# Patient Record
Sex: FEMALE | Race: ASIAN | NOT HISPANIC OR LATINO | Employment: OTHER | URBAN - METROPOLITAN AREA
[De-identification: names, ages, dates, MRNs, and addresses within clinical notes are randomized per-mention and may not be internally consistent; named-entity substitution may affect disease eponyms.]

---

## 2019-07-10 LAB — HEP C HIM: NORMAL

## 2019-10-01 LAB
PAP-ABSTRACT: NORMAL
TSH SERPL-ACNC: 1.01 MIU/L

## 2019-10-31 ENCOUNTER — TRANSFERRED RECORDS (OUTPATIENT)
Dept: HEALTH INFORMATION MANAGEMENT | Facility: CLINIC | Age: 24
End: 2019-10-31

## 2019-11-15 ENCOUNTER — TRANSFERRED RECORDS (OUTPATIENT)
Dept: HEALTH INFORMATION MANAGEMENT | Facility: CLINIC | Age: 24
End: 2019-11-15

## 2019-12-02 ENCOUNTER — OFFICE VISIT (OUTPATIENT)
Dept: DERMATOLOGY | Facility: CLINIC | Age: 24
End: 2019-12-02
Payer: COMMERCIAL

## 2019-12-02 DIAGNOSIS — L73.9 FOLLICULITIS: Primary | ICD-10-CM

## 2019-12-02 DIAGNOSIS — D23.71 DERMATOFIBROMA OF RIGHT LOWER EXTREMITY: ICD-10-CM

## 2019-12-02 RX ORDER — DOXYCYCLINE 100 MG/1
100 CAPSULE ORAL 2 TIMES DAILY
Qty: 60 CAPSULE | Refills: 1 | Status: SHIPPED | OUTPATIENT
Start: 2019-12-02 | End: 2020-01-20

## 2019-12-02 RX ORDER — KETOCONAZOLE 20 MG/ML
SHAMPOO TOPICAL
Qty: 120 ML | Refills: 11 | Status: SHIPPED | OUTPATIENT
Start: 2019-12-02 | End: 2020-08-19

## 2019-12-02 RX ORDER — CLINDAMYCIN PHOSPHATE 11.9 MG/ML
SOLUTION TOPICAL
Qty: 60 ML | Refills: 1 | Status: SHIPPED | OUTPATIENT
Start: 2019-12-02 | End: 2020-06-24

## 2019-12-02 ASSESSMENT — PAIN SCALES - GENERAL: PAINLEVEL: NO PAIN (0)

## 2019-12-02 NOTE — LETTER
12/2/2019       RE: Pam Thompson  [address]  [place] [id number]     Dear Colleague,    Thank you for referring your patient, Pam Thompson, to the [facility] DERMATOLOGY at [facility] Please see a copy of my visit note below.    [facility] Dermatology Note      Dermatology Problem List:  1. Folliculitis, scalp  - ketoconazole 2% shampoo, use on the scalp every day to every other day  - doxycycline 100 mg PO BID for 4 weeks  - clindamycin 1% lotion, use if positive pregnancy test and discontinue doxycycline    Encounter Date: Dec 2, 2019    CC:  Chief Complaint   Patient presents with     Derm Problem     Lesions on scalp, worse in the past 6 months.         History of Present Illness:  Ms. Pam Thompson is a 23 year old female who presents in self referral as a new patient in the dermatology clinic for evaluation of lesions on her scalp.    She is with her  and two children. She reports that the lesions on her scalp have been present for two years. She has not noticed any hair loss associated with the lesions. She uses Tresume shampoo when she washes her scalp once a week but showers every day. The lesions have gotten worse in the last six months. One month ago the lesions were much more widespread on her scalp. She shaved her hair in mid October as the lesions were bothersome to her. Today is an average day in terms of the severity of the lesions. She last used shampoo today. She notes that she has a lot of flaking of the scalp. She and her  are currently trying for a child and they are not interested in medication that would interfere with that process.     She also notes a lesion of interest on her right foot. This arose in May and presented as a white dot and has since grown and become tender to the touch as it rubs against her shoes. Denies similar lesions in the past.     Denies a family hx of skin  diseases or skin cancers.    Otherwise she is feeling well, without additional skin concerns at this time.      Past Medical History:   There is no problem list on file for this patient.    No past medical history on file.  No past surgical history on file.    Social History:   Lives at home with her  and two children    Family History:  Denies a family hx of skin diseases or skin cancers.  No family history on file.    Medications:  No current outpatient medications on file.       Allergies   Allergen Reactions     Watermelon Flavor Anaphylaxis     Lip and face swelling.       Review of Systems:  -As per HPI  -Constitutional: The patient denies fatigue, fevers, chills, unintended weight loss, and night sweats.  -HEENT: Patient denies nonhealing oral sores.  -Skin: As above in HPI. No additional skin concerns.    Physical exam:  Vitals: There were no vitals taken for this visit.  GEN: This is a well developed, well-nourished female in no acute distress, in a pleasant mood.    SKIN: Focused examination of the face, scalp, and right foot was performed.    - 4 mm flesh colored papule on the right dorsal foot.  - Scattered perifollicular papules and pustules throughout the scalp, concentrated on the right temporal scalp  - No other lesions of concern on areas examined.       Impression/Plan:  1. Folliculitis, scalp     Start ketoconazole 2% shampoo, use on the scalp every day to every other day    Start doxycycline 100 mg PO BID for 4 weeks    Start clindamycin 1% Solution if patient discovers she is pregnant.     Pt will wait to start treatments until she takes a pregnancy test. She was advised topical clindamycin safe in pregnancy but would not want to use doxycycline    Recommended to consume probiotics such as yogurt while on antibiotics    Photodocumentation was obtained today    2. Dermatofibroma, right dorsal foot    Benign nature reassured, no further intervention required at this time.      Photodocumentation was obtained today    Will monitor at follow up for continued growth      Follow-up in 4-6 weeks, earlier for new or changing lesions.       Staff Involved:  Staff/Scribe    Scribe Disclosure:  I, Aric Rudolph am serving as a scribe to document services personally performed by Jodie Carlisle PA-C based on data collection and the provider's statements to me.     Provider Disclosure:   The documentation recorded by the scribe accurately reflects the services I personally performed and the decisions made by me.    All risks, benefits and alternatives were discussed with patient.  Patient is in agreement and understands the assessment and plan.  All questions were answered.  Sun Screen Education was given.   Return to Clinic in 4-6 weeks or sooner as needed.   Jodie Carlisle PA-C   [facility]                 Again, thank you for allowing me to participate in the care of your patient.      Sincerely,    Jodie Carlisle PA-C

## 2019-12-02 NOTE — PROGRESS NOTES
[facility] Dermatology Note      Dermatology Problem List:  1. Folliculitis, scalp  - ketoconazole 2% shampoo, use on the scalp every day to every other day  - doxycycline 100 mg PO BID for 4 weeks  - clindamycin 1% lotion, use if positive pregnancy test and discontinue doxycycline    Encounter Date: Dec 2, 2019    CC:  Chief Complaint   Patient presents with     Derm Problem     Lesions on scalp, worse in the past 6 months.         History of Present Illness:  Ms. Pam Thompson is a 23 year old female who presents in self referral as a new patient in the dermatology clinic for evaluation of lesions on her scalp.    She is with her  and two children. She reports that the lesions on her scalp have been present for two years. She has not noticed any hair loss associated with the lesions. She uses Tresume shampoo when she washes her scalp once a week but showers every day. The lesions have gotten worse in the last six months. One month ago the lesions were much more widespread on her scalp. She shaved her hair in mid October as the lesions were bothersome to her. Today is an average day in terms of the severity of the lesions. She last used shampoo today. She notes that she has a lot of flaking of the scalp. She and her  are currently trying for a child and they are not interested in medication that would interfere with that process.     She also notes a lesion of interest on her right foot. This arose in May and presented as a white dot and has since grown and become tender to the touch as it rubs against her shoes. Denies similar lesions in the past.     Denies a family hx of skin diseases or skin cancers.    Otherwise she is feeling well, without additional skin concerns at this time.      Past Medical History:   There is no problem list on file for this patient.    No past medical history on file.  No past surgical history on file.    Social History:   Lives at home with her   and two children    Family History:  Denies a family hx of skin diseases or skin cancers.  No family history on file.    Medications:  No current outpatient medications on file.       Allergies   Allergen Reactions     Watermelon Flavor Anaphylaxis     Lip and face swelling.       Review of Systems:  -As per HPI  -Constitutional: The patient denies fatigue, fevers, chills, unintended weight loss, and night sweats.  -HEENT: Patient denies nonhealing oral sores.  -Skin: As above in HPI. No additional skin concerns.    Physical exam:  Vitals: There were no vitals taken for this visit.  GEN: This is a well developed, well-nourished female in no acute distress, in a pleasant mood.    SKIN: Focused examination of the face, scalp, and right foot was performed.    - 4 mm flesh colored papule on the right dorsal foot.  - Scattered perifollicular papules and pustules throughout the scalp, concentrated on the right temporal scalp  - No other lesions of concern on areas examined.       Impression/Plan:  1. Folliculitis, scalp     Start ketoconazole 2% shampoo, use on the scalp every day to every other day    Start doxycycline 100 mg PO BID for 4 weeks    Start clindamycin 1% Solution if patient discovers she is pregnant.     Pt will wait to start treatments until she takes a pregnancy test. She was advised topical clindamycin safe in pregnancy but would not want to use doxycycline    Recommended to consume probiotics such as yogurt while on antibiotics    Photodocumentation was obtained today    2. Dermatofibroma, right dorsal foot    Benign nature reassured, no further intervention required at this time.     Photodocumentation was obtained today    Will monitor at follow up for continued growth      Follow-up in 4-6 weeks, earlier for new or changing lesions.       Staff Involved:  Staff/Scribe    Scribe Disclosure:  Aric BARNETT am serving as a scribe to document services personally performed by Jodie Carlisle  JOE based on data collection and the provider's statements to me.     Provider Disclosure:   The documentation recorded by the scribe accurately reflects the services I personally performed and the decisions made by me.    All risks, benefits and alternatives were discussed with patient.  Patient is in agreement and understands the assessment and plan.  All questions were answered.  Sun Screen Education was given.   Return to Clinic in 4-6 weeks or sooner as needed.   Jodie Carlisle PA-C   [facility] Dermatology [facility]

## 2019-12-02 NOTE — LETTER
Date:December 5, 2019      Patient was self referred, no letter generated. Do not send.        [facility] Information

## 2019-12-02 NOTE — NURSING NOTE
Dermatology Rooming Note    Pam Thompson's goals for this visit include:   Chief Complaint   Patient presents with     Derm Problem     Lesions on scalp, worse in the past 6 months.     Jessica Templeton LPN

## 2019-12-06 ENCOUNTER — TELEPHONE (OUTPATIENT)
Dept: DERMATOLOGY | Facility: CLINIC | Age: 24
End: 2019-12-06

## 2019-12-06 NOTE — TELEPHONE ENCOUNTER
[facility] Call Center    Phone Message    May a detailed message be left on voicemail: yes    Reason for Call: Medication Question or concern regarding medication   Prescription Clarification  Name of Medication: clindamycin (CLEOCIN T) 1 % external solution  Prescribing Provider: SRAVAN   Pharmacy: [facility] [id number] [place] [facility] [place], [place] - [address]   What on the order needs clarification?  This rx instruction only says 'to the scalp'. Pharmacy needs instructions in regards to frequency. Please call pt back once this has been resolved. They would like to get this today if possible.     Action Taken: Message routed to:  [facility] derm

## 2019-12-09 NOTE — TELEPHONE ENCOUNTER
[facility] Call [facility]    Phone Message    May a detailed message be left on voicemail: yes    Reason for Call: Medication Question or concern regarding medication   Prescription Clarification  Name of Medication: clindamycin (CLEOCIN T) 1 % external solution  Prescribing Provider: Mary Carlisle   Pharmacy: Request to have the Rx sent to the [facility] pharmacy   What on the order needs clarification? Bat calling to request that the Rx be sent to the [facility] pharmacy with the updated instructions vs trying to go through the [facility] in [place].  Please call him once the prescription has been sent so he can pick it up          Action Taken: Message routed to:  [facility]

## 2019-12-09 NOTE — TELEPHONE ENCOUNTER
I called the [facility] pharmacy and I spoke with Rekha. She will transfer the RX from [facility] over to [facility].   Mary May, CMA

## 2020-01-20 ENCOUNTER — OFFICE VISIT (OUTPATIENT)
Dept: DERMATOLOGY | Facility: CLINIC | Age: 25
End: 2020-01-20
Payer: COMMERCIAL

## 2020-01-20 DIAGNOSIS — Z87.2 HISTORY OF FOLLICULITIS: Primary | ICD-10-CM

## 2020-01-20 DIAGNOSIS — D23.71 DERMATOFIBROMA OF RIGHT LOWER EXTREMITY: ICD-10-CM

## 2020-01-20 ASSESSMENT — PAIN SCALES - GENERAL: PAINLEVEL: NO PAIN (0)

## 2020-01-20 NOTE — PROGRESS NOTES
[facility] Dermatology Note      Dermatology Problem List:  1. Folliculitis, scalp  - s/p clindamycin 1% lotion BID   - ketoconazole 2% shampoo, use on the scalp 2-3 times per week      Encounter Date: Jan 20, 2020    CC:  Chief Complaint   Patient presents with     Derm Problem     folliculitis f/u - Pam states that things have gotten much better         History of Present Illness:  Ms. Pam Thompson is a 24 year old female who presents as a follow up for scalp folliculitis. She was last seen in the dermatology clinic on 12/2/19 when she started ketoconazole 2% shampoo every day to every other day, and started doxycycline 100 mg PO BID for 4 weeks with plans to transition to clindamycin 1% solution if she became pregnant as she and her  were trying for a child at that time.     She is with her  and two children. She reports that she never started doxycycline at the last visit, but she has been washing her scalp daily, using ketoconazole 2% shampoo daily, and using clindamycin 1% solution twice a day to the scalp. She notes that her hair is regrowing nicely and her folliculitis is very significantly improved.     The lesion on her right foot has not changed since the last visit.    Otherwise she is feeling well, without additional skin concerns at this time.      Past Medical History:   There is no problem list on file for this patient.    No past medical history on file.  No past surgical history on file.    Social History:   Lives at home with her  and two children    Family History:  Denies a family hx of skin diseases or skin cancers.  No family history on file.    Medications:  Current Outpatient Medications   Medication Sig Dispense Refill     clindamycin (CLEOCIN T) 1 % external solution To the scalp 60 mL 1     ketoconazole (NIZORAL) 2 % external shampoo Use to wash scalp daily to every other day. 120 mL 11       Allergies   Allergen Reactions      Watermelon Flavor Anaphylaxis     Lip and face swelling.       Review of Systems:  -As per HPI  -Constitutional: The patient denies fatigue, fevers, chills, unintended weight loss, and night sweats.  -HEENT: Patient denies nonhealing oral sores.  -Skin: As above in HPI. No additional skin concerns.    Physical exam:  Vitals: There were no vitals taken for this visit.  GEN: This is a well developed, well-nourished female in no acute distress, in a pleasant mood.    SKIN: Focused examination of the face, scalp, and right foot was performed.    - No papules or pustules noted on the scalp, scalp is without scale  - 4 mm flesh colored papule on the right dorsal foot.  - No other lesions of concern on areas examined.       Impression/Plan:  1. History of Folliculitis, scalp     Decrease use of ketoconazole 2% shampoo to 2-3 times per week alternating with previous OTC anti-dandruff shampoo    Discontinue clindamycin 1% Solution, use prn for flaring as it arises    Photodocumentation was obtained today    2. Dermatofibroma, right dorsal foot, no changes noted from the last visit    Benign nature reassured, no further intervention required at this time.     Informational handout provided      Follow-up prn, earlier for new or changing lesions.       Staff Involved:  Staff/Scribe    Scribe Disclosure:  Aric BARNETT am serving as a scribe to document services personally performed by Jodie Carlisle PA-C based on data collection and the provider's statements to me.     Provider Disclosure:   The documentation recorded by the scribe accurately reflects the services I personally performed and the decisions made by me.    All risks, benefits and alternatives were discussed with patient.  Patient is in agreement and understands the assessment and plan.  All questions were answered.  Sun Screen Education was given.   Return to Clinic as needed.   Jodie Carlisle PA-C   [facility]

## 2020-01-20 NOTE — NURSING NOTE
Dermatology Rooming Note    Pam Thompson's goals for this visit include:   Chief Complaint   Patient presents with     Derm Problem     folliculitis f/u - Pam states that things have gotten much better     Dora Grewal, EMT

## 2020-01-20 NOTE — LETTER
Date:January 21, 2020      Patient was self referred, no letter generated. Do not send.        [facility] Health Information

## 2020-01-20 NOTE — LETTER
1/20/2020       RE: Pam Thompson  [address]  [place] [address]     Dear Colleague,    Thank you for referring your patient, Pam Thompson, to the [facility] DERMATOLOGY at [facility] Please see a copy of my visit note below.    [facility] Dermatology Note      Dermatology Problem List:  1. Folliculitis, scalp  - s/p clindamycin 1% lotion BID   - ketoconazole 2% shampoo, use on the scalp 2-3 times per week      Encounter Date: Jan 20, 2020    CC:  Chief Complaint   Patient presents with     Derm Problem     folliculitis f/u - Pam states that things have gotten much better         History of Present Illness:  Ms. Pam Thompson is a 24 year old female who presents as a follow up for scalp folliculitis. She was last seen in the dermatology clinic on 12/2/19 when she started ketoconazole 2% shampoo every day to every other day, and started doxycycline 100 mg PO BID for 4 weeks with plans to transition to clindamycin 1% solution if she became pregnant as she and her  were trying for a child at that time.     She is with her  and two children. She reports that she never started doxycycline at the last visit, but she has been washing her scalp daily, using ketoconazole 2% shampoo daily, and using clindamycin 1% solution twice a day to the scalp. She notes that her hair is regrowing nicely and her folliculitis is very significantly improved.     The lesion on her right foot has not changed since the last visit.    Otherwise she is feeling well, without additional skin concerns at this time.      Past Medical History:   There is no problem list on file for this patient.    No past medical history on file.  No past surgical history on file.    Social History:   Lives at home with her  and two children    Family History:  Denies a family hx of skin diseases or skin cancers.  No family history on  file.    Medications:  Current Outpatient Medications   Medication Sig Dispense Refill     clindamycin (CLEOCIN T) 1 % external solution To the scalp 60 mL 1     ketoconazole (NIZORAL) 2 % external shampoo Use to wash scalp daily to every other day. 120 mL 11       Allergies   Allergen Reactions     Watermelon Flavor Anaphylaxis     Lip and face swelling.       Review of Systems:  -As per HPI  -Constitutional: The patient denies fatigue, fevers, chills, unintended weight loss, and night sweats.  -HEENT: Patient denies nonhealing oral sores.  -Skin: As above in HPI. No additional skin concerns.    Physical exam:  Vitals: There were no vitals taken for this visit.  GEN: This is a well developed, well-nourished female in no acute distress, in a pleasant mood.    SKIN: Focused examination of the face, scalp, and right foot was performed.    - No papules or pustules noted on the scalp, scalp is without scale  - 4 mm flesh colored papule on the right dorsal foot.  - No other lesions of concern on areas examined.       Impression/Plan:  1. History of Folliculitis, scalp     Decrease use of ketoconazole 2% shampoo to 2-3 times per week alternating with previous OTC anti-dandruff shampoo    Discontinue clindamycin 1% Solution, use prn for flaring as it arises    Photodocumentation was obtained today    2. Dermatofibroma, right dorsal foot, no changes noted from the last visit    Benign nature reassured, no further intervention required at this time.     Informational handout provided      Follow-up prn, earlier for new or changing lesions.       Staff Involved:  Staff/Scribe    Scribe Disclosure:  Aric BARNETT am serving as a scribe to document services personally performed by Jodie Carlisle PA-C based on data collection and the provider's statements to me.     Provider Disclosure:   The documentation recorded by the scribe accurately reflects the services I personally performed and the decisions made by me.    All  risks, benefits and alternatives were discussed with patient.  Patient is in agreement and understands the assessment and plan.  All questions were answered.  Sun Screen Education was given.   Return to Clinic as needed.   Jodie Carlisle PA-C   [facility]           Again, thank you for allowing me to participate in the care of your patient.      Sincerely,    Jodie Carlisle PA-C

## 2020-02-25 ENCOUNTER — TRANSFERRED RECORDS (OUTPATIENT)
Dept: HEALTH INFORMATION MANAGEMENT | Facility: CLINIC | Age: 25
End: 2020-02-25

## 2020-03-24 DIAGNOSIS — O36.80X0 PREGNANCY WITH INCONCLUSIVE FETAL VIABILITY: Primary | ICD-10-CM

## 2020-03-24 NOTE — PROGRESS NOTES
Pt has NOB scheduled with ultrasound. Needed order to be placed. Future ultrasound order placed and linked with appt. Closing encounter.   Lorena Lester RN on 3/24/2020 at 4:30 PM

## 2020-03-26 ENCOUNTER — ANCILLARY PROCEDURE (OUTPATIENT)
Dept: ULTRASOUND IMAGING | Facility: CLINIC | Age: 25
End: 2020-03-26
Payer: COMMERCIAL

## 2020-03-26 ENCOUNTER — PRENATAL OFFICE VISIT (OUTPATIENT)
Dept: OBGYN | Facility: CLINIC | Age: 25
End: 2020-03-26
Payer: COMMERCIAL

## 2020-03-26 VITALS
HEART RATE: 72 BPM | DIASTOLIC BLOOD PRESSURE: 60 MMHG | WEIGHT: 109 LBS | SYSTOLIC BLOOD PRESSURE: 108 MMHG | BODY MASS INDEX: 21.4 KG/M2 | HEIGHT: 60 IN

## 2020-03-26 DIAGNOSIS — Z34.81 SUPERVISION OF NORMAL INTRAUTERINE PREGNANCY IN MULTIGRAVIDA IN FIRST TRIMESTER: ICD-10-CM

## 2020-03-26 DIAGNOSIS — O36.80X0 PREGNANCY WITH INCONCLUSIVE FETAL VIABILITY: ICD-10-CM

## 2020-03-26 DIAGNOSIS — Z13.79 GENETIC SCREENING: Primary | ICD-10-CM

## 2020-03-26 PROBLEM — Z34.80 SUPERVISION OF NORMAL INTRAUTERINE PREGNANCY IN MULTIGRAVIDA: Status: ACTIVE | Noted: 2020-03-26

## 2020-03-26 LAB
ABO + RH BLD: NORMAL
ABO + RH BLD: NORMAL
ALBUMIN UR-MCNC: NEGATIVE MG/DL
APPEARANCE UR: CLEAR
BILIRUB UR QL STRIP: NEGATIVE
BLD GP AB SCN SERPL QL: NORMAL
BLOOD BANK CMNT PATIENT-IMP: NORMAL
COLOR UR AUTO: YELLOW
ERYTHROCYTE [DISTWIDTH] IN BLOOD BY AUTOMATED COUNT: 12.2 % (ref 10–15)
GLUCOSE UR STRIP-MCNC: NEGATIVE MG/DL
HCT VFR BLD AUTO: 39.1 % (ref 35–47)
HGB BLD-MCNC: 13.6 G/DL (ref 11.7–15.7)
HGB UR QL STRIP: NEGATIVE
KETONES UR STRIP-MCNC: NEGATIVE MG/DL
LEUKOCYTE ESTERASE UR QL STRIP: NEGATIVE
MCH RBC QN AUTO: 32.6 PG (ref 26.5–33)
MCHC RBC AUTO-ENTMCNC: 34.8 G/DL (ref 31.5–36.5)
MCV RBC AUTO: 94 FL (ref 78–100)
NITRATE UR QL: NEGATIVE
PH UR STRIP: 7 PH (ref 5–7)
PLATELET # BLD AUTO: 313 10E9/L (ref 150–450)
RBC # BLD AUTO: 4.17 10E12/L (ref 3.8–5.2)
SOURCE: NORMAL
SP GR UR STRIP: 1.01 (ref 1–1.03)
SPECIMEN EXP DATE BLD: NORMAL
UROBILINOGEN UR STRIP-ACNC: 0.2 EU/DL (ref 0.2–1)
WBC # BLD AUTO: 11.9 10E9/L (ref 4–11)

## 2020-03-26 PROCEDURE — 36415 COLL VENOUS BLD VENIPUNCTURE: CPT | Performed by: OBSTETRICS & GYNECOLOGY

## 2020-03-26 PROCEDURE — 87389 HIV-1 AG W/HIV-1&-2 AB AG IA: CPT | Performed by: OBSTETRICS & GYNECOLOGY

## 2020-03-26 PROCEDURE — 81003 URINALYSIS AUTO W/O SCOPE: CPT | Performed by: OBSTETRICS & GYNECOLOGY

## 2020-03-26 PROCEDURE — 76801 OB US < 14 WKS SINGLE FETUS: CPT | Performed by: OBSTETRICS & GYNECOLOGY

## 2020-03-26 PROCEDURE — 87086 URINE CULTURE/COLONY COUNT: CPT | Performed by: OBSTETRICS & GYNECOLOGY

## 2020-03-26 PROCEDURE — 99207 ZZC FIRST OB VISIT: CPT | Performed by: OBSTETRICS & GYNECOLOGY

## 2020-03-26 PROCEDURE — 86762 RUBELLA ANTIBODY: CPT | Performed by: OBSTETRICS & GYNECOLOGY

## 2020-03-26 PROCEDURE — 86900 BLOOD TYPING SEROLOGIC ABO: CPT | Performed by: OBSTETRICS & GYNECOLOGY

## 2020-03-26 PROCEDURE — 87340 HEPATITIS B SURFACE AG IA: CPT | Performed by: OBSTETRICS & GYNECOLOGY

## 2020-03-26 PROCEDURE — 86901 BLOOD TYPING SEROLOGIC RH(D): CPT | Performed by: OBSTETRICS & GYNECOLOGY

## 2020-03-26 PROCEDURE — 86850 RBC ANTIBODY SCREEN: CPT | Performed by: OBSTETRICS & GYNECOLOGY

## 2020-03-26 PROCEDURE — 85027 COMPLETE CBC AUTOMATED: CPT | Performed by: OBSTETRICS & GYNECOLOGY

## 2020-03-26 PROCEDURE — 86780 TREPONEMA PALLIDUM: CPT | Performed by: OBSTETRICS & GYNECOLOGY

## 2020-03-26 ASSESSMENT — ANXIETY QUESTIONNAIRES
2. NOT BEING ABLE TO STOP OR CONTROL WORRYING: NOT AT ALL
GAD7 TOTAL SCORE: 0
5. BEING SO RESTLESS THAT IT IS HARD TO SIT STILL: NOT AT ALL
1. FEELING NERVOUS, ANXIOUS, OR ON EDGE: NOT AT ALL
7. FEELING AFRAID AS IF SOMETHING AWFUL MIGHT HAPPEN: NOT AT ALL
6. BECOMING EASILY ANNOYED OR IRRITABLE: NOT AT ALL
IF YOU CHECKED OFF ANY PROBLEMS ON THIS QUESTIONNAIRE, HOW DIFFICULT HAVE THESE PROBLEMS MADE IT FOR YOU TO DO YOUR WORK, TAKE CARE OF THINGS AT HOME, OR GET ALONG WITH OTHER PEOPLE: NOT DIFFICULT AT ALL
3. WORRYING TOO MUCH ABOUT DIFFERENT THINGS: NOT AT ALL

## 2020-03-26 ASSESSMENT — PATIENT HEALTH QUESTIONNAIRE - PHQ9
SUM OF ALL RESPONSES TO PHQ QUESTIONS 1-9: 10
5. POOR APPETITE OR OVEREATING: NOT AT ALL

## 2020-03-26 ASSESSMENT — MIFFLIN-ST. JEOR: SCORE: 1160.92

## 2020-03-26 NOTE — Clinical Note
Needs phone visit at 16 wks with mc if in, otherwise put at 15 wks  Needs ultrasound at 20 wks or 21 wks with mc  Currently 8 wks

## 2020-03-26 NOTE — PROGRESS NOTES
SUBJECTIVE:     HPI:    This is a 24 year old female patient,  who presents for her first obstetrical visit.      ISABEL: 11/3/2020, by Last Menstrual Period.  She is 8w2d weeks.  Her cycles are regular.  Her last menstrual period was normal.   Since her LMP, she has experienced  nausea and fatigue).     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      She has a history of  2 uncomplicated pregnancies, 2     Since her last LMP she denies use of alcohol, tobacco and street drugs.    Past medical, surgical, social and family history were reviewed and updated in [facility].        Current Outpatient Medications   Medication     ketoconazole (NIZORAL) 2 % external shampoo     Prenat w/o [id number]-DHA (PNV-DHA PO)     clindamycin (CLEOCIN T) 1 % external solution     No current facility-administered medications for this visit.        ROS:   12 point review of systems negative other than symptoms noted below or in the HPI.  Constitutional: Fatigue  Gastrointestinal: Nausea      OBJECTIVE:     EXAM:  /60   Pulse 72   Ht 1.524 m (5')   Wt 49.4 kg (109 lb)   LMP 2020   BMI 21.29 kg/m   Body mass index is 21.29 kg/m .    GENERAL: healthy, alert and no distress  EYES: Eyes grossly normal to inspection, PERRL and conjunctivae and sclerae normal  HENT: ear canals and TM's normal, nose and mouth without ulcers or lesions  SKIN: no suspicious lesions or rashes  NEURO: Normal  strength and tone, mentation intact and speech normal  PSYCH: mentation appears normal, affect normal/bright    ASSESSMENT/PLAN:       ICD-10-CM    1. Supervision of normal intrauterine pregnancy in multigravida in first trimester  Z34.81        24 year old , 8w2d weeks of pregnancy with ISABEL of 11/3/2020, by Last Menstrual Period    Discussed as follows: discussed covid and impact on prenatal care  Discussed rotating doctor schedules each week  Doesn't want inatal  Call if she wants zofran prescription 8mg ODT  Plan phone visit 5 wks     Counseling given:   - Follow up in 4-6 weeks for return OB visit.  - Recommended weight gain for pregnancy: 25-35 lbs.         PLAN/PATIENT INSTRUCTIONS:    Phone visit in 5 weeks with MC on  during my clinic day  Call if losing significant weight  Will use home scale  Call if she wants to try zofran if the B6 and unisom tablets don't help  Continue pnv      Janet De La Garza MD

## 2020-03-26 NOTE — PROGRESS NOTES
Please abstract the following data from this visit with this patient into the appropriate field in Epic:    Tests that can be patient reported without a hard copy:    Pap smear done on this date: 10/4/19 (approximately), by this group: [facility], results were negative.     Shara Newell RN on 3/26/2020 at 3:28 PM

## 2020-03-27 LAB
BACTERIA SPEC CULT: NO GROWTH
HBV SURFACE AG SERPL QL IA: NONREACTIVE
HIV 1+2 AB+HIV1 P24 AG SERPL QL IA: NONREACTIVE
Lab: NORMAL
RUBV IGG SERPL IA-ACNC: 32 IU/ML
SPECIMEN SOURCE: NORMAL
T PALLIDUM AB SER QL: NONREACTIVE

## 2020-03-27 ASSESSMENT — ANXIETY QUESTIONNAIRES: GAD7 TOTAL SCORE: 0

## 2020-04-24 NOTE — PROGRESS NOTES
"Pam Thompson is a 24 year old female who is being evaluated via a billable telephone visit.    Patient has some headaches  Ok for one cup coffee daily  Would like to do inatal, come this week  Plan  clinic 5/21   on phone today      The patient has been notified of following:     \"This telephone visit will be conducted via a call between you and your physician/provider. We have found that certain health care needs can be provided without the need for a physical exam.  This service lets us provide the care you need with a short phone conversation.  If a prescription is necessary we can send it directly to your pharmacy.  If lab work is needed we can place an order for that and you can then stop by our lab to have the test done at a later time.    Telephone visits are billed at different rates depending on your insurance coverage. During this emergency period, for some insurers they may be billed the same as an in-person visit.  Please reach out to your insurance provider with any questions.    If during the course of the call the physician/provider feels a telephone visit is not appropriate, you will not be charged for this service.\"    Patient has given verbal consent for Telephone visit?  Yes    How would you like to obtain your AVS? MyChart    Phone call duration: 10 minutes    Janet De La Garza MD        "

## 2020-04-27 ENCOUNTER — VIRTUAL VISIT (OUTPATIENT)
Dept: OBGYN | Facility: CLINIC | Age: 25
End: 2020-04-27
Payer: COMMERCIAL

## 2020-04-27 DIAGNOSIS — Z13.71 TESTING FOR GENETIC DISEASE CARRIER STATUS: ICD-10-CM

## 2020-04-27 DIAGNOSIS — Z34.81 SUPERVISION OF NORMAL INTRAUTERINE PREGNANCY IN MULTIGRAVIDA IN FIRST TRIMESTER: Primary | ICD-10-CM

## 2020-04-27 PROCEDURE — 99207 ZZC PRENATAL VISIT: CPT | Performed by: OBSTETRICS & GYNECOLOGY

## 2020-05-26 ENCOUNTER — PRENATAL OFFICE VISIT (OUTPATIENT)
Dept: OBGYN | Facility: CLINIC | Age: 25
End: 2020-05-26
Payer: COMMERCIAL

## 2020-05-26 VITALS — BODY MASS INDEX: 21.72 KG/M2 | DIASTOLIC BLOOD PRESSURE: 52 MMHG | SYSTOLIC BLOOD PRESSURE: 100 MMHG | WEIGHT: 111.2 LBS

## 2020-05-26 DIAGNOSIS — Z34.82 SUPERVISION OF NORMAL INTRAUTERINE PREGNANCY IN MULTIGRAVIDA IN SECOND TRIMESTER: ICD-10-CM

## 2020-05-26 DIAGNOSIS — Z36.1 NEED FOR MATERNAL SERUM ALPHA-PROTEIN (MSAFP) SCREENING: Primary | ICD-10-CM

## 2020-05-26 PROCEDURE — 99207 ZZC PRENATAL VISIT: CPT | Performed by: OBSTETRICS & GYNECOLOGY

## 2020-05-26 NOTE — PROGRESS NOTES
Plan was us for 6/24 but may have baby sitting issues so may reschedule with a partner if needed  Feels good  Gained 2 lbs  Declined inatal  Anatomy us next time  Clinic with me at 24 wks

## 2020-05-26 NOTE — Clinical Note
Needs clinic mc and anatomy  6/24   If doesn't work for her ok to schedule a later date with a partner

## 2020-06-24 ENCOUNTER — ANCILLARY PROCEDURE (OUTPATIENT)
Dept: ULTRASOUND IMAGING | Facility: CLINIC | Age: 25
End: 2020-06-24
Attending: OBSTETRICS & GYNECOLOGY
Payer: COMMERCIAL

## 2020-06-24 ENCOUNTER — PRENATAL OFFICE VISIT (OUTPATIENT)
Dept: OBGYN | Facility: CLINIC | Age: 25
End: 2020-06-24
Payer: COMMERCIAL

## 2020-06-24 ENCOUNTER — ANCILLARY PROCEDURE (OUTPATIENT)
Dept: ULTRASOUND IMAGING | Facility: CLINIC | Age: 25
End: 2020-06-24
Payer: COMMERCIAL

## 2020-06-24 VITALS — SYSTOLIC BLOOD PRESSURE: 90 MMHG | BODY MASS INDEX: 22.65 KG/M2 | DIASTOLIC BLOOD PRESSURE: 62 MMHG | WEIGHT: 116 LBS

## 2020-06-24 DIAGNOSIS — O44.03 PLACENTA PREVIA WITHOUT HEMORRHAGE IN THIRD TRIMESTER: ICD-10-CM

## 2020-06-24 DIAGNOSIS — Z34.82 SUPERVISION OF NORMAL INTRAUTERINE PREGNANCY IN MULTIGRAVIDA IN SECOND TRIMESTER: ICD-10-CM

## 2020-06-24 DIAGNOSIS — O44.03 PLACENTA PREVIA WITHOUT HEMORRHAGE IN THIRD TRIMESTER: Primary | ICD-10-CM

## 2020-06-24 PROCEDURE — 99207 ZZC PRENATAL VISIT: CPT | Performed by: OBSTETRICS & GYNECOLOGY

## 2020-06-24 PROCEDURE — 76805 OB US >/= 14 WKS SNGL FETUS: CPT | Performed by: OBSTETRICS & GYNECOLOGY

## 2020-06-24 NOTE — PROGRESS NOTES
Posterior placenta previa  Discussed no intercourse, travel or vigorous exercise   clinic 24 wks  Mary us and gluc screen 28 wks

## 2020-07-15 DIAGNOSIS — Z23 NEED FOR TDAP VACCINATION: ICD-10-CM

## 2020-07-15 DIAGNOSIS — Z34.82 SUPERVISION OF NORMAL INTRAUTERINE PREGNANCY IN MULTIGRAVIDA IN SECOND TRIMESTER: Primary | ICD-10-CM

## 2020-07-21 ENCOUNTER — ANCILLARY PROCEDURE (OUTPATIENT)
Dept: ULTRASOUND IMAGING | Facility: CLINIC | Age: 25
End: 2020-07-21
Payer: COMMERCIAL

## 2020-07-21 ENCOUNTER — PRENATAL OFFICE VISIT (OUTPATIENT)
Dept: OBGYN | Facility: CLINIC | Age: 25
End: 2020-07-21
Payer: COMMERCIAL

## 2020-07-21 VITALS — WEIGHT: 121 LBS | DIASTOLIC BLOOD PRESSURE: 60 MMHG | BODY MASS INDEX: 23.63 KG/M2 | SYSTOLIC BLOOD PRESSURE: 100 MMHG

## 2020-07-21 DIAGNOSIS — O44.02 PLACENTA PREVIA IN SECOND TRIMESTER: ICD-10-CM

## 2020-07-21 DIAGNOSIS — Z34.82 SUPERVISION OF NORMAL INTRAUTERINE PREGNANCY IN MULTIGRAVIDA IN SECOND TRIMESTER: ICD-10-CM

## 2020-07-21 LAB — GLUCOSE 1H P 50 G GLC PO SERPL-MCNC: 84 MG/DL (ref 60–129)

## 2020-07-21 PROCEDURE — 36415 COLL VENOUS BLD VENIPUNCTURE: CPT | Performed by: OBSTETRICS & GYNECOLOGY

## 2020-07-21 PROCEDURE — 85027 COMPLETE CBC AUTOMATED: CPT | Performed by: OBSTETRICS & GYNECOLOGY

## 2020-07-21 PROCEDURE — 99207 ZZC PRENATAL VISIT: CPT | Performed by: OBSTETRICS & GYNECOLOGY

## 2020-07-21 PROCEDURE — 82950 GLUCOSE TEST: CPT | Performed by: OBSTETRICS & GYNECOLOGY

## 2020-07-21 PROCEDURE — 76815 OB US LIMITED FETUS(S): CPT | Performed by: OBSTETRICS & GYNECOLOGY

## 2020-07-21 NOTE — LETTER
[facility] [place]  [address] [address]  [phone]      July 21, 2020      Pam Thompson  [address]  [place] [address]              To Whom It May Concern:    Pam Thompson is being seen in our clinic for prenatal care.  Her Estimated Date of Delivery: Nov 3, 2020.  Patient's last menstrual period was 01/28/2020..      Sincerely,    Janet De La Garza MD

## 2020-07-21 NOTE — Clinical Note
Patient is only 25 wks today  Return 4 wks, will need tdap and hgb then  Glucose screen was done today early

## 2020-07-21 NOTE — PROGRESS NOTES
tdap and hgb next time  Gluc screen today  Previa gone on ultrasound return 4 wks  Patient feels good  Good fm

## 2020-07-22 LAB
ERYTHROCYTE [DISTWIDTH] IN BLOOD BY AUTOMATED COUNT: 12.2 % (ref 10–15)
HCT VFR BLD AUTO: 35.1 % (ref 35–47)
HGB BLD-MCNC: 11.7 G/DL (ref 11.7–15.7)
MCH RBC QN AUTO: 33.6 PG (ref 26.5–33)
MCHC RBC AUTO-ENTMCNC: 33.3 G/DL (ref 31.5–36.5)
MCV RBC AUTO: 101 FL (ref 78–100)
PLATELET # BLD AUTO: 272 10E9/L (ref 150–450)
RBC # BLD AUTO: 3.48 10E12/L (ref 3.8–5.2)
WBC # BLD AUTO: 11.7 10E9/L (ref 4–11)

## 2020-08-19 ENCOUNTER — PRENATAL OFFICE VISIT (OUTPATIENT)
Dept: OBGYN | Facility: CLINIC | Age: 25
End: 2020-08-19
Payer: COMMERCIAL

## 2020-08-19 VITALS — SYSTOLIC BLOOD PRESSURE: 100 MMHG | WEIGHT: 129 LBS | DIASTOLIC BLOOD PRESSURE: 62 MMHG | BODY MASS INDEX: 25.19 KG/M2

## 2020-08-19 DIAGNOSIS — Z23 NEED FOR TDAP VACCINATION: ICD-10-CM

## 2020-08-19 DIAGNOSIS — Z34.83 SUPERVISION OF NORMAL INTRAUTERINE PREGNANCY IN MULTIGRAVIDA IN THIRD TRIMESTER: ICD-10-CM

## 2020-08-19 PROCEDURE — 90471 IMMUNIZATION ADMIN: CPT | Performed by: OBSTETRICS & GYNECOLOGY

## 2020-08-19 PROCEDURE — 99207 ZZC PRENATAL VISIT: CPT | Performed by: OBSTETRICS & GYNECOLOGY

## 2020-08-19 PROCEDURE — 90715 TDAP VACCINE 7 YRS/> IM: CPT | Performed by: OBSTETRICS & GYNECOLOGY

## 2020-08-19 NOTE — NURSING NOTE
Prior to immunization administration, verified patients identity using patient s name and date of birth. Please see Immunization Activity for additional information.     Screening Questionnaire for Adult Immunization    Are you sick today?   No   Do you have allergies to medications, food, a vaccine component or latex?   No   Have you ever had a serious reaction after receiving a vaccination?   No   Do you have a long-term health problem with heart, lung, kidney, or metabolic disease (e.g., diabetes), asthma, a blood disorder, no spleen, complement component deficiency, a cochlear implant, or a spinal fluid leak?  Are you on long-term aspirin therapy?   No   Do you have cancer, leukemia, HIV/AIDS, or any other immune system problem?   No   Do you have a parent, brother, or sister with an immune system problem?   No   In the past 3 months, have you taken medications that affect  your immune system, such as prednisone, other steroids, or anticancer drugs; drugs for the treatment of rheumatoid arthritis, Crohn s disease, or psoriasis; or have you had radiation treatments?   No   Have you had a seizure, or a brain or other nervous system problem?   No   During the past year, have you received a transfusion of blood or blood    products, or been given immune (gamma) globulin or antiviral drug?   No   For women: Are you pregnant or is there a chance you could become       pregnant during the next month?   Yes   Have you received any vaccinations in the past 4 weeks?   No     Immunization questionnaire answers were all negative.        Per orders of Dr. De La Garza, injection of Tdap given by Mirta Oliveira CMA. Patient instructed to remain in clinic for 15 minutes afterwards, and to report any adverse reaction to me immediately.       Screening performed by Mirta Oliveira CMA on 8/19/2020 at 2:23 PM.

## 2020-09-02 ENCOUNTER — PRENATAL OFFICE VISIT (OUTPATIENT)
Dept: OBGYN | Facility: CLINIC | Age: 25
End: 2020-09-02
Payer: COMMERCIAL

## 2020-09-02 VITALS — DIASTOLIC BLOOD PRESSURE: 52 MMHG | BODY MASS INDEX: 25.51 KG/M2 | WEIGHT: 130.6 LBS | SYSTOLIC BLOOD PRESSURE: 104 MMHG

## 2020-09-02 DIAGNOSIS — Z34.83 SUPERVISION OF NORMAL INTRAUTERINE PREGNANCY IN MULTIGRAVIDA IN THIRD TRIMESTER: ICD-10-CM

## 2020-09-02 DIAGNOSIS — Z23 NEED FOR PROPHYLACTIC VACCINATION AND INOCULATION AGAINST INFLUENZA: Primary | ICD-10-CM

## 2020-09-02 PROCEDURE — 90471 IMMUNIZATION ADMIN: CPT | Performed by: OBSTETRICS & GYNECOLOGY

## 2020-09-02 PROCEDURE — 99207 ZZC PRENATAL VISIT: CPT | Performed by: OBSTETRICS & GYNECOLOGY

## 2020-09-02 PROCEDURE — 90686 IIV4 VACC NO PRSV 0.5 ML IM: CPT | Performed by: OBSTETRICS & GYNECOLOGY

## 2020-09-02 NOTE — PROGRESS NOTES
Patient has had more [place]  No lof, spotting or discharge  2 prior term deliveries  Reassured patient  Good fm  Flu vaccine today  Return 2 wks

## 2020-09-16 ENCOUNTER — PRENATAL OFFICE VISIT (OUTPATIENT)
Dept: OBGYN | Facility: CLINIC | Age: 25
End: 2020-09-16
Payer: COMMERCIAL

## 2020-09-16 VITALS — BODY MASS INDEX: 25.97 KG/M2 | SYSTOLIC BLOOD PRESSURE: 102 MMHG | DIASTOLIC BLOOD PRESSURE: 58 MMHG | WEIGHT: 133 LBS

## 2020-09-16 DIAGNOSIS — Z34.83 SUPERVISION OF NORMAL INTRAUTERINE PREGNANCY IN MULTIGRAVIDA IN THIRD TRIMESTER: ICD-10-CM

## 2020-09-16 PROCEDURE — 99207 ZZC PRENATAL VISIT: CPT | Performed by: OBSTETRICS & GYNECOLOGY

## 2020-09-16 NOTE — PROGRESS NOTES
Good fm  Return 2 wks  Explained to patient we only go to [facility] for deliveries  Had flu shot

## 2020-09-30 ENCOUNTER — PRENATAL OFFICE VISIT (OUTPATIENT)
Dept: OBGYN | Facility: CLINIC | Age: 25
End: 2020-09-30
Payer: COMMERCIAL

## 2020-09-30 VITALS — WEIGHT: 137 LBS | SYSTOLIC BLOOD PRESSURE: 102 MMHG | BODY MASS INDEX: 26.76 KG/M2 | DIASTOLIC BLOOD PRESSURE: 70 MMHG

## 2020-09-30 DIAGNOSIS — Z34.83 SUPERVISION OF NORMAL INTRAUTERINE PREGNANCY IN MULTIGRAVIDA IN THIRD TRIMESTER: ICD-10-CM

## 2020-09-30 PROCEDURE — 99207 ZZC PRENATAL VISIT: CPT | Performed by: OBSTETRICS & GYNECOLOGY

## 2020-09-30 NOTE — PROGRESS NOTES
Third baby  Wants another growth us, schedule in 2 wks  Constipation- try miralax and stool softener  Return 1 wk

## 2020-10-07 ENCOUNTER — PRENATAL OFFICE VISIT (OUTPATIENT)
Dept: OBGYN | Facility: CLINIC | Age: 25
End: 2020-10-07
Payer: COMMERCIAL

## 2020-10-07 VITALS — DIASTOLIC BLOOD PRESSURE: 68 MMHG | BODY MASS INDEX: 27.34 KG/M2 | WEIGHT: 140 LBS | SYSTOLIC BLOOD PRESSURE: 108 MMHG

## 2020-10-07 DIAGNOSIS — Z34.83 SUPERVISION OF NORMAL INTRAUTERINE PREGNANCY IN MULTIGRAVIDA IN THIRD TRIMESTER: ICD-10-CM

## 2020-10-07 DIAGNOSIS — Z36.85 ANTENATAL SCREENING FOR STREPTOCOCCUS B: Primary | ICD-10-CM

## 2020-10-07 PROCEDURE — 87653 STREP B DNA AMP PROBE: CPT | Performed by: OBSTETRICS & GYNECOLOGY

## 2020-10-07 PROCEDURE — 99207 PR PRENATAL VISIT: CPT | Performed by: OBSTETRICS & GYNECOLOGY

## 2020-10-09 LAB
GP B STREP DNA SPEC QL NAA+PROBE: NEGATIVE
SPECIMEN SOURCE: NORMAL

## 2020-10-12 ENCOUNTER — PRENATAL OFFICE VISIT (OUTPATIENT)
Dept: OBGYN | Facility: CLINIC | Age: 25
End: 2020-10-12
Attending: OBSTETRICS & GYNECOLOGY
Payer: COMMERCIAL

## 2020-10-12 ENCOUNTER — ANCILLARY PROCEDURE (OUTPATIENT)
Dept: ULTRASOUND IMAGING | Facility: CLINIC | Age: 25
End: 2020-10-12
Attending: OBSTETRICS & GYNECOLOGY
Payer: COMMERCIAL

## 2020-10-12 VITALS — DIASTOLIC BLOOD PRESSURE: 52 MMHG | BODY MASS INDEX: 27.69 KG/M2 | SYSTOLIC BLOOD PRESSURE: 98 MMHG | WEIGHT: 141.8 LBS

## 2020-10-12 DIAGNOSIS — Z34.83 SUPERVISION OF NORMAL INTRAUTERINE PREGNANCY IN MULTIGRAVIDA IN THIRD TRIMESTER: Primary | ICD-10-CM

## 2020-10-12 DIAGNOSIS — Z34.83 SUPERVISION OF NORMAL INTRAUTERINE PREGNANCY IN MULTIGRAVIDA IN THIRD TRIMESTER: ICD-10-CM

## 2020-10-12 PROCEDURE — 76816 OB US FOLLOW-UP PER FETUS: CPT

## 2020-10-12 PROCEDURE — 99207 PR PRENATAL VISIT: CPT | Performed by: OBSTETRICS & GYNECOLOGY

## 2020-10-12 NOTE — PROGRESS NOTES
gbs was negative  Normal blood pressure  3/th/-3  Growth us normal with EFW 6-1 28% and normal fluid  No bleeding or lof  Return 1 wk

## 2020-10-20 ENCOUNTER — PRENATAL OFFICE VISIT (OUTPATIENT)
Dept: OBGYN | Facility: CLINIC | Age: 25
End: 2020-10-20
Payer: COMMERCIAL

## 2020-10-20 VITALS — DIASTOLIC BLOOD PRESSURE: 62 MMHG | SYSTOLIC BLOOD PRESSURE: 120 MMHG | WEIGHT: 143 LBS | BODY MASS INDEX: 27.93 KG/M2

## 2020-10-20 DIAGNOSIS — Z34.83 SUPERVISION OF NORMAL INTRAUTERINE PREGNANCY IN MULTIGRAVIDA IN THIRD TRIMESTER: ICD-10-CM

## 2020-10-20 PROCEDURE — 99207 PR PRENATAL VISIT: CPT | Performed by: OBSTETRICS & GYNECOLOGY

## 2020-10-20 NOTE — PROGRESS NOTES
Cervix no change this week  Head is still high and floating  Good fm  Normal blood pressure  Return 1 wk

## 2020-10-23 ENCOUNTER — NURSE TRIAGE (OUTPATIENT)
Dept: NURSING | Facility: CLINIC | Age: 25
End: 2020-10-23

## 2020-10-23 NOTE — TELEPHONE ENCOUNTER
"23 y/o female and  call about tightness of belly, with occasional contractions, 38w 3d. Contractions are not consistent and not timed at this point. No fever, no vaginal bleeding, no rupture of membranes. No new headache or vision changes, no new swelling or decreased fetal movement. Discussed expectations for tightness and pressure in final weeks of last trimester. Rest and Hydration per protocols.     RN advised to call back with any changes, worsening of symptoms, and questions or concerns.     Heaven Brown RN - [place] Nurse Advisor  10/23/2000  6:15AM    Additional Information    Negative: Passed out (i.e., lost consciousness, collapsed and was not responding)    Negative: Shock suspected (e.g., cold/pale/clammy skin, too weak to stand, low BP, rapid pulse)    Negative: Difficult to awaken or acting confused (e.g., disoriented, slurred speech)    Negative: [1] SEVERE abdominal pain (e.g., excruciating) AND [2] constant AND [3] present > 1 hour    Negative: Severe bleeding (e.g., continuous red blood from vagina, or large blood clots)    Negative: Umbilical cord hanging out of the vagina (shiny, white, curled appearance, \"like telephone cord\")    Negative: Uncontrollable urge to push (i.e., feels like baby is coming out now)    Negative: Can see baby    Negative: Sounds like a life-threatening emergency to the triager    Negative: [1] First baby (primipara) AND [2] contractions < 6 minutes apart  AND [3] present 2 hours    Negative: [1] History of prior delivery (multipara) AND [2] contractions < 10 minutes apart AND [3] present 1 hour    Negative: [1] History of rapid prior delivery AND [2] contractions < 10 minutes apart    Negative: [1] Leakage of fluid from vagina AND [2] green or brown in color    Negative: [1] Leakage of fluid from vagina AND [2] leakage started > 4 hours ago    Negative: Vaginal bleeding or spotting    Negative: Baby moving less today (e.g., kick count < 5 in 1 hour or < 10 in 2 " hours)    Negative: Severe headache or headache that won't go away    Negative: New blurred vision or vision changes    Negative: MODERATE-SEVERE abdominal pain    Negative: Fever > 100.4 F (38.0 C)    Negative: [1] Leakage of fluid from vagina AND [2] leakage started < 4 hours ago    Negative: Patient sounds very sick or weak to the triager    [1] History of prior delivery (multipara) AND [2] contractions > 10 minutes, or for < 1 hour    Negative: [1] First baby (primipara) AND [2] contractions > 5 minutes apart, or for < 2 hours    Protocols used: PREGNANCY - LABOR-A-

## 2020-10-26 ENCOUNTER — PRENATAL OFFICE VISIT (OUTPATIENT)
Dept: OBGYN | Facility: CLINIC | Age: 25
End: 2020-10-26
Payer: COMMERCIAL

## 2020-10-26 ENCOUNTER — TELEPHONE (OUTPATIENT)
Dept: OBGYN | Facility: CLINIC | Age: 25
End: 2020-10-26

## 2020-10-26 VITALS — BODY MASS INDEX: 28.12 KG/M2 | DIASTOLIC BLOOD PRESSURE: 64 MMHG | SYSTOLIC BLOOD PRESSURE: 120 MMHG | WEIGHT: 144 LBS

## 2020-10-26 DIAGNOSIS — Z34.83 SUPERVISION OF NORMAL INTRAUTERINE PREGNANCY IN MULTIGRAVIDA IN THIRD TRIMESTER: ICD-10-CM

## 2020-10-26 PROCEDURE — 99207 PR PRENATAL VISIT: CPT | Performed by: OBSTETRICS & GYNECOLOGY

## 2020-10-26 NOTE — PROGRESS NOTES
3/60/-4 ballotable still  No real change, feels same as 2 weeks ago  Normal bp  + fetal movement   She has been having more intermittent cramping but not regular ctx yet   here with patient today  They are considering induction for next week Mon, Tues or Wed  Last 2 babies [place]  Had amnio with induction at 38 wks for one and 40 week induction  Discussed we have to schedule a covid test for inductions so they will let me know their wishes by phone

## 2020-10-27 ENCOUNTER — ANESTHESIA EVENT (OUTPATIENT)
Dept: OBGYN | Facility: CLINIC | Age: 25
End: 2020-10-27
Payer: COMMERCIAL

## 2020-10-27 ENCOUNTER — HOSPITAL ENCOUNTER (INPATIENT)
Facility: CLINIC | Age: 25
LOS: 4 days | Discharge: HOME | End: 2020-10-31
Attending: OBSTETRICS & GYNECOLOGY | Admitting: OBSTETRICS & GYNECOLOGY
Payer: COMMERCIAL

## 2020-10-27 ENCOUNTER — ANESTHESIA (OUTPATIENT)
Dept: OBGYN | Facility: CLINIC | Age: 25
End: 2020-10-27
Payer: COMMERCIAL

## 2020-10-27 ENCOUNTER — NURSE TRIAGE (OUTPATIENT)
Dept: NURSING | Facility: CLINIC | Age: 25
End: 2020-10-27

## 2020-10-27 DIAGNOSIS — Z34.83 SUPERVISION OF NORMAL INTRAUTERINE PREGNANCY IN MULTIGRAVIDA IN THIRD TRIMESTER: Primary | ICD-10-CM

## 2020-10-27 LAB
ABO + RH BLD: NORMAL
ABO + RH BLD: NORMAL
BASOPHILS # BLD AUTO: 0 10E9/L (ref 0–0.2)
BASOPHILS NFR BLD AUTO: 0.1 %
BLD GP AB SCN SERPL QL: NORMAL
BLOOD BANK CMNT PATIENT-IMP: NORMAL
DIFFERENTIAL METHOD BLD: ABNORMAL
EOSINOPHIL # BLD AUTO: 0.1 10E9/L (ref 0–0.7)
EOSINOPHIL NFR BLD AUTO: 1 %
ERYTHROCYTE [DISTWIDTH] IN BLOOD BY AUTOMATED COUNT: 13.2 % (ref 10–15)
HCT VFR BLD AUTO: 36.9 % (ref 35–47)
HGB BLD-MCNC: 12.4 G/DL (ref 11.7–15.7)
IMM GRANULOCYTES # BLD: 0.2 10E9/L (ref 0–0.4)
IMM GRANULOCYTES NFR BLD: 1.6 %
LABORATORY COMMENT REPORT: NORMAL
LYMPHOCYTES # BLD AUTO: 2.1 10E9/L (ref 0.8–5.3)
LYMPHOCYTES NFR BLD AUTO: 22.6 %
MCH RBC QN AUTO: 33.9 PG (ref 26.5–33)
MCHC RBC AUTO-ENTMCNC: 33.6 G/DL (ref 31.5–36.5)
MCV RBC AUTO: 101 FL (ref 78–100)
MONOCYTES # BLD AUTO: 0.8 10E9/L (ref 0–1.3)
MONOCYTES NFR BLD AUTO: 8.5 %
NEUTROPHILS # BLD AUTO: 6.2 10E9/L (ref 1.6–8.3)
NEUTROPHILS NFR BLD AUTO: 66.2 %
NRBC # BLD AUTO: 0 10*3/UL
NRBC BLD AUTO-RTO: 0 /100
PLATELET # BLD AUTO: 269 10E9/L (ref 150–450)
RBC # BLD AUTO: 3.66 10E12/L (ref 3.8–5.2)
SARS-COV-2 RNA SPEC QL NAA+PROBE: NEGATIVE
SARS-COV-2 RNA SPEC QL NAA+PROBE: NORMAL
SPECIMEN EXP DATE BLD: NORMAL
SPECIMEN SOURCE: NORMAL
SPECIMEN SOURCE: NORMAL
T PALLIDUM AB SER QL: NONREACTIVE
WBC # BLD AUTO: 9.3 10E9/L (ref 4–11)

## 2020-10-27 PROCEDURE — 86901 BLOOD TYPING SEROLOGIC RH(D): CPT | Performed by: OBSTETRICS & GYNECOLOGY

## 2020-10-27 PROCEDURE — 370N000002 HC ANESTHESIA TECHNICAL FEE, EACH ADDTL 15 MIN: Performed by: OBSTETRICS & GYNECOLOGY

## 2020-10-27 PROCEDURE — 250N000011 HC RX IP 250 OP 636: Performed by: OBSTETRICS & GYNECOLOGY

## 2020-10-27 PROCEDURE — 370N000003 HC ANESTHESIA WARD SERVICE

## 2020-10-27 PROCEDURE — 250N000009 HC RX 250: Performed by: ANESTHESIOLOGY

## 2020-10-27 PROCEDURE — 250N000011 HC RX IP 250 OP 636

## 2020-10-27 PROCEDURE — 250N000011 HC RX IP 250 OP 636: Performed by: ANESTHESIOLOGY

## 2020-10-27 PROCEDURE — 250N000013 HC RX MED GY IP 250 OP 250 PS 637: Performed by: OBSTETRICS & GYNECOLOGY

## 2020-10-27 PROCEDURE — 360N000020 HC SURGERY LEVEL 3 1ST 30 MIN: Performed by: OBSTETRICS & GYNECOLOGY

## 2020-10-27 PROCEDURE — 3E0R3BZ INTRODUCTION OF ANESTHETIC AGENT INTO SPINAL CANAL, PERCUTANEOUS APPROACH: ICD-10-PCS | Performed by: ANESTHESIOLOGY

## 2020-10-27 PROCEDURE — 370N000001 HC ANESTHESIA TECHNICAL FEE, 1ST 30 MIN: Performed by: OBSTETRICS & GYNECOLOGY

## 2020-10-27 PROCEDURE — 258N000003 HC RX IP 258 OP 636: Performed by: OBSTETRICS & GYNECOLOGY

## 2020-10-27 PROCEDURE — 250N000009 HC RX 250: Performed by: NURSE ANESTHETIST, CERTIFIED REGISTERED

## 2020-10-27 PROCEDURE — 86900 BLOOD TYPING SEROLOGIC ABO: CPT | Performed by: OBSTETRICS & GYNECOLOGY

## 2020-10-27 PROCEDURE — 258N000003 HC RX IP 258 OP 636: Performed by: NURSE ANESTHETIST, CERTIFIED REGISTERED

## 2020-10-27 PROCEDURE — 761N000004 HC RECOVERY PHASE 1 LEVEL 2 EA ADDTL HR: Performed by: OBSTETRICS & GYNECOLOGY

## 2020-10-27 PROCEDURE — G0463 HOSPITAL OUTPT CLINIC VISIT: HCPCS | Mod: 25

## 2020-10-27 PROCEDURE — U0003 INFECTIOUS AGENT DETECTION BY NUCLEIC ACID (DNA OR RNA); SEVERE ACUTE RESPIRATORY SYNDROME CORONAVIRUS 2 (SARS-COV-2) (CORONAVIRUS DISEASE [COVID-19]), AMPLIFIED PROBE TECHNIQUE, MAKING USE OF HIGH THROUGHPUT TECHNOLOGIES AS DESCRIBED BY CMS-2020-01-R: HCPCS | Performed by: OBSTETRICS & GYNECOLOGY

## 2020-10-27 PROCEDURE — 258N000003 HC RX IP 258 OP 636: Performed by: ANESTHESIOLOGY

## 2020-10-27 PROCEDURE — 360N000021 HC SURGERY LEVEL 3 EA 15 ADDTL MIN: Performed by: OBSTETRICS & GYNECOLOGY

## 2020-10-27 PROCEDURE — 272N000001 HC OR GENERAL SUPPLY STERILE: Performed by: OBSTETRICS & GYNECOLOGY

## 2020-10-27 PROCEDURE — 36415 COLL VENOUS BLD VENIPUNCTURE: CPT | Performed by: OBSTETRICS & GYNECOLOGY

## 2020-10-27 PROCEDURE — 86780 TREPONEMA PALLIDUM: CPT | Performed by: OBSTETRICS & GYNECOLOGY

## 2020-10-27 PROCEDURE — 761N000003 HC RECOVERY PHASE 1 LEVEL 2 FIRST HR: Performed by: OBSTETRICS & GYNECOLOGY

## 2020-10-27 PROCEDURE — 250N000011 HC RX IP 250 OP 636: Performed by: NURSE ANESTHETIST, CERTIFIED REGISTERED

## 2020-10-27 PROCEDURE — 250N000009 HC RX 250: Performed by: OBSTETRICS & GYNECOLOGY

## 2020-10-27 PROCEDURE — 120N000012 HC R&B POSTPARTUM

## 2020-10-27 PROCEDURE — 00HU33Z INSERTION OF INFUSION DEVICE INTO SPINAL CANAL, PERCUTANEOUS APPROACH: ICD-10-PCS | Performed by: ANESTHESIOLOGY

## 2020-10-27 PROCEDURE — 85025 COMPLETE CBC W/AUTO DIFF WBC: CPT | Performed by: OBSTETRICS & GYNECOLOGY

## 2020-10-27 PROCEDURE — 86850 RBC ANTIBODY SCREEN: CPT | Performed by: OBSTETRICS & GYNECOLOGY

## 2020-10-27 PROCEDURE — 59510 CESAREAN DELIVERY: CPT | Performed by: OBSTETRICS & GYNECOLOGY

## 2020-10-27 PROCEDURE — 59025 FETAL NON-STRESS TEST: CPT

## 2020-10-27 RX ORDER — NALOXONE HYDROCHLORIDE 0.4 MG/ML
.1-.4 INJECTION, SOLUTION INTRAMUSCULAR; INTRAVENOUS; SUBCUTANEOUS
Status: DISCONTINUED | OUTPATIENT
Start: 2020-10-27 | End: 2020-10-27

## 2020-10-27 RX ORDER — LIDOCAINE 40 MG/G
CREAM TOPICAL
Status: DISCONTINUED | OUTPATIENT
Start: 2020-10-27 | End: 2020-10-31 | Stop reason: HOSPADM

## 2020-10-27 RX ORDER — CLINDAMYCIN PHOSPHATE 900 MG/50ML
900 INJECTION, SOLUTION INTRAVENOUS EVERY 8 HOURS
Status: COMPLETED | OUTPATIENT
Start: 2020-10-27 | End: 2020-10-28

## 2020-10-27 RX ORDER — OXYCODONE HYDROCHLORIDE 5 MG/1
5 TABLET ORAL
Status: DISCONTINUED | OUTPATIENT
Start: 2020-10-27 | End: 2020-10-31 | Stop reason: HOSPADM

## 2020-10-27 RX ORDER — BISACODYL 10 MG
10 SUPPOSITORY, RECTAL RECTAL DAILY PRN
Status: DISCONTINUED | OUTPATIENT
Start: 2020-10-29 | End: 2020-10-31 | Stop reason: HOSPADM

## 2020-10-27 RX ORDER — METHYLERGONOVINE MALEATE 0.2 MG/ML
200 INJECTION INTRAVENOUS
Status: DISCONTINUED | OUTPATIENT
Start: 2020-10-27 | End: 2020-10-27

## 2020-10-27 RX ORDER — KETOROLAC TROMETHAMINE 30 MG/ML
INJECTION, SOLUTION INTRAMUSCULAR; INTRAVENOUS
Status: DISCONTINUED
Start: 2020-10-27 | End: 2020-10-27 | Stop reason: HOSPADM

## 2020-10-27 RX ORDER — MODIFIED LANOLIN
OINTMENT (GRAM) TOPICAL
Status: DISCONTINUED | OUTPATIENT
Start: 2020-10-27 | End: 2020-10-31 | Stop reason: HOSPADM

## 2020-10-27 RX ORDER — SODIUM CHLORIDE, SODIUM LACTATE, POTASSIUM CHLORIDE, CALCIUM CHLORIDE 600; 310; 30; 20 MG/100ML; MG/100ML; MG/100ML; MG/100ML
INJECTION, SOLUTION INTRAVENOUS CONTINUOUS
Status: DISCONTINUED | OUTPATIENT
Start: 2020-10-27 | End: 2020-10-27

## 2020-10-27 RX ORDER — SIMETHICONE 80 MG
80 TABLET,CHEWABLE ORAL 4 TIMES DAILY PRN
Status: DISCONTINUED | OUTPATIENT
Start: 2020-10-27 | End: 2020-10-31 | Stop reason: HOSPADM

## 2020-10-27 RX ORDER — CARBOPROST TROMETHAMINE 250 UG/ML
250 INJECTION, SOLUTION INTRAMUSCULAR
Status: DISCONTINUED | OUTPATIENT
Start: 2020-10-27 | End: 2020-10-27

## 2020-10-27 RX ORDER — CITRIC ACID/SODIUM CITRATE 334-500MG
30 SOLUTION, ORAL ORAL
Status: COMPLETED | OUTPATIENT
Start: 2020-10-27 | End: 2020-10-27

## 2020-10-27 RX ORDER — LIDOCAINE 40 MG/G
CREAM TOPICAL
Status: DISCONTINUED | OUTPATIENT
Start: 2020-10-27 | End: 2020-10-27

## 2020-10-27 RX ORDER — DEXTROSE, SODIUM CHLORIDE, SODIUM LACTATE, POTASSIUM CHLORIDE, AND CALCIUM CHLORIDE 5; .6; .31; .03; .02 G/100ML; G/100ML; G/100ML; G/100ML; G/100ML
INJECTION, SOLUTION INTRAVENOUS CONTINUOUS
Status: DISCONTINUED | OUTPATIENT
Start: 2020-10-27 | End: 2020-10-31 | Stop reason: HOSPADM

## 2020-10-27 RX ORDER — ONDANSETRON 2 MG/ML
4 INJECTION INTRAMUSCULAR; INTRAVENOUS EVERY 6 HOURS PRN
Status: DISCONTINUED | OUTPATIENT
Start: 2020-10-27 | End: 2020-10-27

## 2020-10-27 RX ORDER — LIDOCAINE HYDROCHLORIDE AND EPINEPHRINE 15; 5 MG/ML; UG/ML
3 INJECTION, SOLUTION EPIDURAL
Status: COMPLETED | OUTPATIENT
Start: 2020-10-27 | End: 2020-10-27

## 2020-10-27 RX ORDER — ONDANSETRON 4 MG/1
4 TABLET, ORALLY DISINTEGRATING ORAL EVERY 6 HOURS PRN
Status: DISCONTINUED | OUTPATIENT
Start: 2020-10-27 | End: 2020-10-27

## 2020-10-27 RX ORDER — METHYLERGONOVINE MALEATE 0.2 MG/ML
200 INJECTION INTRAVENOUS
Status: DISCONTINUED | OUTPATIENT
Start: 2020-10-27 | End: 2020-10-31 | Stop reason: HOSPADM

## 2020-10-27 RX ORDER — OXYTOCIN/0.9 % SODIUM CHLORIDE 30/500 ML
PLASTIC BAG, INJECTION (ML) INTRAVENOUS CONTINUOUS PRN
Status: DISCONTINUED | OUTPATIENT
Start: 2020-10-27 | End: 2020-10-27

## 2020-10-27 RX ORDER — EPHEDRINE SULFATE 50 MG/ML
5 INJECTION, SOLUTION INTRAMUSCULAR; INTRAVENOUS; SUBCUTANEOUS
Status: DISCONTINUED | OUTPATIENT
Start: 2020-10-27 | End: 2020-10-27

## 2020-10-27 RX ORDER — ACETAMINOPHEN 325 MG/1
650 TABLET ORAL EVERY 4 HOURS PRN
Status: DISCONTINUED | OUTPATIENT
Start: 2020-10-30 | End: 2020-10-31 | Stop reason: HOSPADM

## 2020-10-27 RX ORDER — ACETAMINOPHEN 325 MG/1
650 TABLET ORAL EVERY 4 HOURS PRN
Status: DISCONTINUED | OUTPATIENT
Start: 2020-10-27 | End: 2020-10-27

## 2020-10-27 RX ORDER — GENTAMICIN SULFATE 100 MG/100ML
1.5 INJECTION, SOLUTION INTRAVENOUS
Status: COMPLETED | OUTPATIENT
Start: 2020-10-27 | End: 2020-10-27

## 2020-10-27 RX ORDER — HYDROCORTISONE 2.5 %
CREAM (GRAM) TOPICAL 3 TIMES DAILY PRN
Status: DISCONTINUED | OUTPATIENT
Start: 2020-10-27 | End: 2020-10-31 | Stop reason: HOSPADM

## 2020-10-27 RX ORDER — ACETAMINOPHEN 325 MG/1
975 TABLET ORAL EVERY 6 HOURS
Status: DISPENSED | OUTPATIENT
Start: 2020-10-27 | End: 2020-10-30

## 2020-10-27 RX ORDER — AMOXICILLIN 250 MG
1 CAPSULE ORAL 2 TIMES DAILY
Status: DISCONTINUED | OUTPATIENT
Start: 2020-10-27 | End: 2020-10-31 | Stop reason: HOSPADM

## 2020-10-27 RX ORDER — HYDROMORPHONE HYDROCHLORIDE 1 MG/ML
INJECTION, SOLUTION INTRAMUSCULAR; INTRAVENOUS; SUBCUTANEOUS
Status: COMPLETED
Start: 2020-10-27 | End: 2020-10-27

## 2020-10-27 RX ORDER — NALOXONE HYDROCHLORIDE 0.4 MG/ML
.1-.4 INJECTION, SOLUTION INTRAMUSCULAR; INTRAVENOUS; SUBCUTANEOUS
Status: DISCONTINUED | OUTPATIENT
Start: 2020-10-27 | End: 2020-10-31 | Stop reason: HOSPADM

## 2020-10-27 RX ORDER — MISOPROSTOL 200 UG/1
400 TABLET ORAL
Status: DISCONTINUED | OUTPATIENT
Start: 2020-10-27 | End: 2020-10-31 | Stop reason: HOSPADM

## 2020-10-27 RX ORDER — ROPIVACAINE HYDROCHLORIDE 2 MG/ML
10 INJECTION, SOLUTION EPIDURAL; INFILTRATION; PERINEURAL ONCE
Status: COMPLETED | OUTPATIENT
Start: 2020-10-27 | End: 2020-10-27

## 2020-10-27 RX ORDER — OXYTOCIN/0.9 % SODIUM CHLORIDE 30/500 ML
100 PLASTIC BAG, INJECTION (ML) INTRAVENOUS CONTINUOUS
Status: DISCONTINUED | OUTPATIENT
Start: 2020-10-27 | End: 2020-10-31 | Stop reason: HOSPADM

## 2020-10-27 RX ORDER — IBUPROFEN 400 MG/1
800 TABLET, FILM COATED ORAL
Status: DISCONTINUED | OUTPATIENT
Start: 2020-10-27 | End: 2020-10-27

## 2020-10-27 RX ORDER — LIDOCAINE HYDROCHLORIDE AND EPINEPHRINE BITARTRATE 20; .01 MG/ML; MG/ML
INJECTION, SOLUTION SUBCUTANEOUS PRN
Status: DISCONTINUED | OUTPATIENT
Start: 2020-10-27 | End: 2020-10-27

## 2020-10-27 RX ORDER — HYDROMORPHONE HYDROCHLORIDE 1 MG/ML
.3-.5 INJECTION, SOLUTION INTRAMUSCULAR; INTRAVENOUS; SUBCUTANEOUS EVERY 30 MIN PRN
Status: DISCONTINUED | OUTPATIENT
Start: 2020-10-27 | End: 2020-10-31 | Stop reason: HOSPADM

## 2020-10-27 RX ORDER — TRANEXAMIC ACID 10 MG/ML
1 INJECTION, SOLUTION INTRAVENOUS EVERY 30 MIN PRN
Status: DISCONTINUED | OUTPATIENT
Start: 2020-10-27 | End: 2020-10-27

## 2020-10-27 RX ORDER — CLINDAMYCIN PHOSPHATE 900 MG/50ML
900 INJECTION, SOLUTION INTRAVENOUS
Status: COMPLETED | OUTPATIENT
Start: 2020-10-27 | End: 2020-10-27

## 2020-10-27 RX ORDER — OXYTOCIN 10 [USP'U]/ML
10 INJECTION, SOLUTION INTRAMUSCULAR; INTRAVENOUS
Status: DISCONTINUED | OUTPATIENT
Start: 2020-10-27 | End: 2020-10-27

## 2020-10-27 RX ORDER — CARBOPROST TROMETHAMINE 250 UG/ML
250 INJECTION, SOLUTION INTRAMUSCULAR
Status: DISCONTINUED | OUTPATIENT
Start: 2020-10-27 | End: 2020-10-31 | Stop reason: HOSPADM

## 2020-10-27 RX ORDER — GENTAMICIN SULFATE 100 MG/100ML
1.5 INJECTION, SOLUTION INTRAVENOUS EVERY 8 HOURS
Status: COMPLETED | OUTPATIENT
Start: 2020-10-27 | End: 2020-10-28

## 2020-10-27 RX ORDER — OXYCODONE AND ACETAMINOPHEN 5; 325 MG/1; MG/1
1 TABLET ORAL
Status: DISCONTINUED | OUTPATIENT
Start: 2020-10-27 | End: 2020-10-27

## 2020-10-27 RX ORDER — TRANEXAMIC ACID 10 MG/ML
1 INJECTION, SOLUTION INTRAVENOUS EVERY 30 MIN PRN
Status: DISCONTINUED | OUTPATIENT
Start: 2020-10-27 | End: 2020-10-31 | Stop reason: HOSPADM

## 2020-10-27 RX ORDER — IBUPROFEN 400 MG/1
800 TABLET, FILM COATED ORAL EVERY 6 HOURS PRN
Status: DISCONTINUED | OUTPATIENT
Start: 2020-10-28 | End: 2020-10-31 | Stop reason: HOSPADM

## 2020-10-27 RX ORDER — ACETAMINOPHEN 325 MG/1
975 TABLET ORAL EVERY 6 HOURS
Status: DISCONTINUED | OUTPATIENT
Start: 2020-10-27 | End: 2020-10-27

## 2020-10-27 RX ORDER — OXYTOCIN/0.9 % SODIUM CHLORIDE 30/500 ML
100-340 PLASTIC BAG, INJECTION (ML) INTRAVENOUS CONTINUOUS PRN
Status: DISCONTINUED | OUTPATIENT
Start: 2020-10-27 | End: 2020-10-27

## 2020-10-27 RX ORDER — KETOROLAC TROMETHAMINE 30 MG/ML
30 INJECTION, SOLUTION INTRAMUSCULAR; INTRAVENOUS EVERY 6 HOURS
Status: ACTIVE | OUTPATIENT
Start: 2020-10-27 | End: 2020-10-28

## 2020-10-27 RX ORDER — ONDANSETRON 2 MG/ML
4 INJECTION INTRAMUSCULAR; INTRAVENOUS EVERY 6 HOURS PRN
Status: DISCONTINUED | OUTPATIENT
Start: 2020-10-27 | End: 2020-10-31 | Stop reason: HOSPADM

## 2020-10-27 RX ORDER — ONDANSETRON 2 MG/ML
INJECTION INTRAMUSCULAR; INTRAVENOUS PRN
Status: DISCONTINUED | OUTPATIENT
Start: 2020-10-27 | End: 2020-10-27

## 2020-10-27 RX ORDER — AMOXICILLIN 250 MG
2 CAPSULE ORAL 2 TIMES DAILY
Status: DISCONTINUED | OUTPATIENT
Start: 2020-10-27 | End: 2020-10-31 | Stop reason: HOSPADM

## 2020-10-27 RX ORDER — OXYTOCIN/0.9 % SODIUM CHLORIDE 30/500 ML
340 PLASTIC BAG, INJECTION (ML) INTRAVENOUS CONTINUOUS PRN
Status: DISCONTINUED | OUTPATIENT
Start: 2020-10-27 | End: 2020-10-31 | Stop reason: HOSPADM

## 2020-10-27 RX ORDER — NALBUPHINE HYDROCHLORIDE 10 MG/ML
2.5-5 INJECTION, SOLUTION INTRAMUSCULAR; INTRAVENOUS; SUBCUTANEOUS EVERY 6 HOURS PRN
Status: DISCONTINUED | OUTPATIENT
Start: 2020-10-27 | End: 2020-10-27

## 2020-10-27 RX ORDER — OXYTOCIN 10 [USP'U]/ML
10 INJECTION, SOLUTION INTRAMUSCULAR; INTRAVENOUS
Status: DISCONTINUED | OUTPATIENT
Start: 2020-10-27 | End: 2020-10-31 | Stop reason: HOSPADM

## 2020-10-27 RX ORDER — ACETAMINOPHEN 325 MG/1
975 TABLET ORAL ONCE
Status: COMPLETED | OUTPATIENT
Start: 2020-10-27 | End: 2020-10-27

## 2020-10-27 RX ORDER — FENTANYL CITRATE 50 UG/ML
50-100 INJECTION, SOLUTION INTRAMUSCULAR; INTRAVENOUS
Status: DISCONTINUED | OUTPATIENT
Start: 2020-10-27 | End: 2020-10-27

## 2020-10-27 RX ORDER — MORPHINE SULFATE 1 MG/ML
INJECTION, SOLUTION EPIDURAL; INTRATHECAL; INTRAVENOUS PRN
Status: DISCONTINUED | OUTPATIENT
Start: 2020-10-27 | End: 2020-10-27

## 2020-10-27 RX ADMIN — SODIUM CITRATE AND CITRIC ACID MONOHYDRATE 30 ML: 500; 334 SOLUTION ORAL at 08:30

## 2020-10-27 RX ADMIN — ACETAMINOPHEN 975 MG: 325 TABLET, FILM COATED ORAL at 20:26

## 2020-10-27 RX ADMIN — ONDANSETRON 4 MG: 2 INJECTION INTRAMUSCULAR; INTRAVENOUS at 09:02

## 2020-10-27 RX ADMIN — PHENYLEPHRINE HYDROCHLORIDE 100 MCG: 10 INJECTION INTRAVENOUS at 09:10

## 2020-10-27 RX ADMIN — HYDROMORPHONE HYDROCHLORIDE 0.3 MG: 1 INJECTION, SOLUTION INTRAMUSCULAR; INTRAVENOUS; SUBCUTANEOUS at 10:57

## 2020-10-27 RX ADMIN — SODIUM CHLORIDE, SODIUM LACTATE, POTASSIUM CHLORIDE, CALCIUM CHLORIDE AND DEXTROSE MONOHYDRATE: 5; 600; 310; 30; 20 INJECTION, SOLUTION INTRAVENOUS at 20:24

## 2020-10-27 RX ADMIN — PHENYLEPHRINE HYDROCHLORIDE 100 MCG: 10 INJECTION INTRAVENOUS at 09:14

## 2020-10-27 RX ADMIN — LIDOCAINE HYDROCHLORIDE AND EPINEPHRINE BITARTRATE 13 ML: 20; .01 INJECTION, SOLUTION SUBCUTANEOUS at 08:39

## 2020-10-27 RX ADMIN — PHENYLEPHRINE HYDROCHLORIDE 100 MCG: 10 INJECTION INTRAVENOUS at 09:26

## 2020-10-27 RX ADMIN — CLINDAMYCIN PHOSPHATE 900 MG: 900 INJECTION, SOLUTION INTRAVENOUS at 08:41

## 2020-10-27 RX ADMIN — PHENYLEPHRINE HYDROCHLORIDE 0.3 MCG/KG/MIN: 10 INJECTION INTRAVENOUS at 08:49

## 2020-10-27 RX ADMIN — Medication 340 ML/HR: at 08:56

## 2020-10-27 RX ADMIN — KETOROLAC TROMETHAMINE 30 MG: 30 INJECTION, SOLUTION INTRAMUSCULAR at 16:47

## 2020-10-27 RX ADMIN — ACETAMINOPHEN 975 MG: 325 TABLET, FILM COATED ORAL at 08:30

## 2020-10-27 RX ADMIN — GENTAMICIN SULFATE 100 MG: 100 INJECTION, SOLUTION INTRAVENOUS at 09:01

## 2020-10-27 RX ADMIN — SODIUM CHLORIDE, POTASSIUM CHLORIDE, SODIUM LACTATE AND CALCIUM CHLORIDE: 600; 310; 30; 20 INJECTION, SOLUTION INTRAVENOUS at 09:01

## 2020-10-27 RX ADMIN — DOCUSATE SODIUM 50 MG AND SENNOSIDES 8.6 MG 1 TABLET: 8.6; 5 TABLET, FILM COATED ORAL at 20:26

## 2020-10-27 RX ADMIN — Medication 5 MG: at 07:09

## 2020-10-27 RX ADMIN — KETOROLAC TROMETHAMINE 30 MG: 30 INJECTION, SOLUTION INTRAMUSCULAR at 10:20

## 2020-10-27 RX ADMIN — MORPHINE SULFATE 1 MG: 1 INJECTION, SOLUTION EPIDURAL; INTRATHECAL; INTRAVENOUS at 08:59

## 2020-10-27 RX ADMIN — SODIUM CHLORIDE, POTASSIUM CHLORIDE, SODIUM LACTATE AND CALCIUM CHLORIDE 1000 ML: 600; 310; 30; 20 INJECTION, SOLUTION INTRAVENOUS at 04:12

## 2020-10-27 RX ADMIN — CLINDAMYCIN PHOSPHATE 900 MG: 900 INJECTION, SOLUTION INTRAVENOUS at 16:55

## 2020-10-27 RX ADMIN — KETOROLAC TROMETHAMINE 30 MG: 30 INJECTION, SOLUTION INTRAMUSCULAR at 22:36

## 2020-10-27 RX ADMIN — LIDOCAINE HYDROCHLORIDE AND EPINEPHRINE 3 ML: 15; 5 INJECTION, SOLUTION EPIDURAL at 05:12

## 2020-10-27 RX ADMIN — SODIUM CHLORIDE, POTASSIUM CHLORIDE, SODIUM LACTATE AND CALCIUM CHLORIDE: 600; 310; 30; 20 INJECTION, SOLUTION INTRAVENOUS at 04:10

## 2020-10-27 RX ADMIN — GENTAMICIN SULFATE 100 MG: 100 INJECTION, SOLUTION INTRAVENOUS at 18:30

## 2020-10-27 RX ADMIN — Medication: at 05:12

## 2020-10-27 RX ADMIN — ROPIVACAINE HYDROCHLORIDE 10 ML: 2 INJECTION, SOLUTION EPIDURAL; INFILTRATION at 05:16

## 2020-10-27 RX ADMIN — ACETAMINOPHEN 975 MG: 325 TABLET, FILM COATED ORAL at 14:37

## 2020-10-27 RX ADMIN — Medication 100 ML/HR: at 13:10

## 2020-10-27 RX ADMIN — SODIUM CHLORIDE, POTASSIUM CHLORIDE, SODIUM LACTATE AND CALCIUM CHLORIDE 250 ML: 600; 310; 30; 20 INJECTION, SOLUTION INTRAVENOUS at 07:10

## 2020-10-27 ASSESSMENT — ACTIVITIES OF DAILY LIVING (ADL)
DIFFICULTY_EATING/SWALLOWING: NO
WALKING_OR_CLIMBING_STAIRS_DIFFICULTY: NO
TOILETING_ISSUES: NO
DRESSING/BATHING_DIFFICULTY: NO
CONCENTRATING,_REMEMBERING_OR_MAKING_DECISIONS_DIFFICULTY: NO
DOING_ERRANDS_INDEPENDENTLY_DIFFICULTY: NO
WEAR_GLASSES_OR_BLIND: NO
FALL_HISTORY_WITHIN_LAST_SIX_MONTHS: NO
FALL_HISTORY_WITHIN_LAST_SIX_MONTHS: NO
DIFFICULTY_COMMUNICATING: NO

## 2020-10-27 NOTE — ANESTHESIA PREPROCEDURE EVALUATION
Anesthesia Pre-Procedure Evaluation    Patient: Pam Thompson   MRN: [id number] : 1995          Preoperative Diagnosis: * No pre-op diagnosis entered *    Procedure(s):   SECTION    Past Medical History:   Diagnosis Date     Migraine      Past Surgical History:   Procedure Laterality Date     APPENDECTOMY         Anesthesia Evaluation     .             ROS/MED HX    ENT/Pulmonary:  - neg pulmonary ROS    (-) sleep apnea   Neurologic:     (+)migraines,     Cardiovascular:  - neg cardiovascular ROS       METS/Exercise Tolerance:     Hematologic:         Musculoskeletal:         GI/Hepatic:  - neg GI/hepatic ROS      (-) GERD   Renal/Genitourinary:  - ROS Renal section negative       Endo:  - neg endo ROS       Psychiatric:         Infectious Disease:         Malignancy:         Other:                          Physical Exam  Normal systems: dental    Airway   Mallampati: II  TM distance: >3 FB  Neck ROM: full    Dental     Cardiovascular   Rhythm and rate: regular      Pulmonary    breath sounds clear to auscultation            Lab Results   Component Value Date    WBC 9.3 10/27/2020    HGB 12.4 10/27/2020    HCT 36.9 10/27/2020     10/27/2020    TSH 1.01 10/01/2019       Preop Vitals  BP Readings from Last 3 Encounters:   10/27/20 111/64   10/26/20 120/64   10/20/20 120/62    Pulse Readings from Last 3 Encounters:   20 72      Resp Readings from Last 3 Encounters:   10/27/20 16    SpO2 Readings from Last 3 Encounters:   10/27/20 95%      Temp Readings from Last 1 Encounters:   10/27/20 37.7  C (99.9  F) (Oral)    Ht Readings from Last 1 Encounters:   20 1.524 m (5')      Wt Readings from Last 1 Encounters:   10/26/20 65.3 kg (144 lb)    Estimated body mass index is 28.12 kg/m  as calculated from the following:    Height as of 3/26/20: 1.524 m (5').    Weight as of 10/26/20: 65.3 kg (144 lb).       Anesthesia Plan      History & Physical Review  History and physical reviewed and  following examination; no interval change.    ASA Status:  2 .    NPO Status:  > 8 hours    Plan for Epidural   PONV prophylaxis:  Ondansetron (or other 5HT-3)         Postoperative Care  Postoperative pain management:  Neuraxial analgesia.      Consents  Anesthetic plan, risks, benefits and alternatives discussed with:  Patient..                 Butch Chowdhury MD

## 2020-10-27 NOTE — PLAN OF CARE
Patient, , and  admitted to room 413 from L&D. Report received from Fina KUNZ RN and  ID bands verified. Fundal assessment and vital signs WNL. Call light within reach, belongings brought with and remained with patient. Infant safety and security discussed with patient and . Encouraged to call RN with needs, will continue to monitor.

## 2020-10-27 NOTE — PLAN OF CARE
DHIRAJ called to pt bedside in recovery room to assess EKG strip. Pt had brief, sudden feeling of heart fluttering in chest. EKG abnormality noted. DHIRAJ assessed strip and discussed with pt. Possible few beat run of Vfib noted. Pt stated she has had this feeling approx once per month while at home as well. No further episodes noted while in recovery. DHIRAJ stated pt is ok to transfer to [facility] without continuing monitoring during post op stay. Cont to monitor and assess.

## 2020-10-27 NOTE — ANESTHESIA PREPROCEDURE EVALUATION
Anesthesia Pre-Procedure Evaluation    Patient: Pam Thompson   MRN: [id number] : 1995          Preoperative Diagnosis: * No surgery found *        Past Medical History:   Diagnosis Date     Migraine      Past Surgical History:   Procedure Laterality Date     APPENDECTOMY                          Lab Results   Component Value Date    WBC 9.3 10/27/2020    HGB 12.4 10/27/2020    HCT 36.9 10/27/2020     10/27/2020    TSH 1.01 10/01/2019       Preop Vitals  BP Readings from Last 3 Encounters:   10/27/20 127/68   10/26/20 120/64   10/20/20 120/62    Pulse Readings from Last 3 Encounters:   20 72      Resp Readings from Last 3 Encounters:   No data found for Resp    SpO2 Readings from Last 3 Encounters:   No data found for SpO2      Temp Readings from Last 1 Encounters:   10/27/20 37.3  C (99.1  F) (Temporal)    Ht Readings from Last 1 Encounters:   20 1.524 m (5')      Wt Readings from Last 1 Encounters:   10/26/20 65.3 kg (144 lb)    Estimated body mass index is 28.12 kg/m  as calculated from the following:    Height as of 3/26/20: 1.524 m (5').    Weight as of 10/26/20: 65.3 kg (144 lb).       Anesthesia Plan      History & Physical Review      ASA Status:  2 .        Plan for Epidural            Postoperative Care      Consents                 YUE DUARTE MD

## 2020-10-27 NOTE — ANESTHESIA PROCEDURE NOTES
Procedure note : epidural catheter      Staff -   Anesthesiologist:  Joey Vidales MD  Performed By: anesthesiologist  Pre-Procedure  Performed by Joey Vidales MD  Location: OB      Pre-Anesthestic Checklist: patient identified, IV checked, risks and benefits discussed, informed consent, monitors and equipment checked, pre-op evaluation and at physician/surgeon's request    Timeout  Correct Patient: Yes   Correct Procedure: Yes   Correct Site: Yes   Correct Laterality: N/A   Correct Position: Yes   Site Marked: N/A   .   Procedure Documentation    .    Procedure: epidural catheter, .   Patient Position:sitting Insertion Site:L4-5  (midline approach) Injection technique: LORT saline   Local skin infiltrated with mL of 1% lidocaine.  SEGUN at 4 cm    Patient Prep/Sterile Barriers; mask, sterile gloves, povidone-iodine 7.5% surgical scrub, patient draped.  .  Needle: Touhy needle   Needle Gauge: 17.    Needle Length (Inches) 3.5   # of attempts: 1 and # of redirects:  .    . .  Catheter threaded easily  .  9 cm at skin.   .    Assessment/Narrative  Paresthesias: No.  .  .  Aspiration negative for heme or CSF  . Test dose of 3 mL lidocaine 1.5% w/ 1:200,000 epinephrine at. Test dose negative for signs of intravascular, subdural or intrathecal injection. Comments:  No complications.  Catheter secured with sterile dressing and tape.  Questions answered.

## 2020-10-27 NOTE — LACTATION NOTE
Initial visit. Father states infant had a good feeding at 1100 but has been sleepy since.  LC assisted and demonstrated proper positioning of infant, maternal hand placement, using breast feeding support pillows, and how to help infant achieve a deep latch with feedings.  Reviewed importance of getting a deep latch with feedings versus a shallow latch.  LC assisted mother to get infant latched onto right breast in the football position.  Infant attempts to latch on multiple times.  Able to suckle for a few short sucking bursts but tires easily.  Infant also spitty and started to choke.  LC demonstrated how to reposition infant, pat on her back, and use bulb syringe to help get excess fluid out of infants mouth.  Parents state understanding.  LC encouraged lots of skin to skin.  Breastfeeding general information reviewed. Breastfeeding section in admission booklet shown and reviewed to Mother and Father.  Shown feeding log and educated on use.  Encouraged rooming in, skin to skin, feeding on demand 8-12x/day or sooner if baby cues.  No further questions at this time. Will follow as needed.   Anayeli Lawrence RN, IBCLC

## 2020-10-27 NOTE — ANESTHESIA CARE TRANSFER NOTE
Patient: Pam Thompson    Procedure(s):   SECTION    Diagnosis: * No pre-op diagnosis entered *  Diagnosis Additional Information: No value filed.    Anesthesia Type:   Epidural     Note:  Airway :Room Air  Patient transferred to:Labor and Delivery  Comments: Transferred to OB PACU recovery, spontaneous respirations on room air with oxygen saturations maintained greater than 92%. SpO2, NiBP, and EKG monitors and alarms on and functioning, report on patient's clinical status given to OB recovery RN, RN questions answered, patient in hospital bed with siderails up, Ilana Hugger warmer connected to patient gown Oxytocin 30 units in 500mL infusion connected to IV infusion pump in recovery bay and programmed to 100 mL/hr at handoff of care.      Handoff Report: Identifed the Patient, Identified the Reponsible Provider, Reviewed the pertinent medical history, Discussed the surgical course, Reviewed Intra-OP anesthesia mangement and issues during anesthesia, Set expectations for post-procedure period and Allowed opportunity for questions and acknowledgement of understanding      Vitals: (Last set prior to Anesthesia Care Transfer)    CRNA VITALS  10/27/2020 0905 - 10/27/2020 0945      10/27/2020             Pulse:  99    SpO2:  97 %    Resp Rate (set):  10                Electronically Signed By: ROMY Villa CRNA  2020  9:39 AM

## 2020-10-27 NOTE — PLAN OF CARE
Pt admitted in labor to room 231. Monitors placed and assessment obtained. POC discussed with pt and . Oriented to room, call light and department. Questions encouraged and answered. RN will work on getting an epidural.

## 2020-10-27 NOTE — LETTER
[facility]  [address]  [place] [address]  [phone]      2020      Re: Pam Thompson  [address]   [place] [address]              To Whom It May Concern:    Pam Thompson is being seen in our clinic for prenatal care.    She delivered 1027/20 by c section.  Her  Dr Killian Schaefer will need time off from work to care for his wife during her surgery recovery and also to care for their  baby.      Sincerely,  Janet De La Garza MD

## 2020-10-27 NOTE — PROVIDER NOTIFICATION
10/27/20 0718   Provider Notification   Provider Name/Title Dr De La Garza   Method of Notification Phone   Request Evaluate in Person   Notification Reason Fetal Baseline Change   Asked Dr De La Garza if she was following pt (Dr Lucas in code section). Dr De La Garza assuming cares. Notified MD that fetal baseline tachycardic. Pt given a bolus, ephedrine and repositioned. No new orders at this time, MD just arrived to hospital and will come evaluate.

## 2020-10-27 NOTE — H&P
No significant change in general health status based on examination of the patient, review of Nursing Admission Database and prenatal record.    EFW: 6lb 8oz     Third baby   arom clear   6cm/80%/-1  Fetal tachycardia present and variability is present   Maternal temp is slightly elevated  Mom received ephedrine so her pulse 107  39 wks   A+  GBS negative  Uncomplicated pregnancy  Hep B negative  Allergies to cephalosporins- itching

## 2020-10-27 NOTE — TELEPHONE ENCOUNTER
ISABEL 11/3/20  39w0d  3rd baby  -unsure of length of last labor  Planning to deliver at [facility]   Dr. De La Garza    Contractions for 1 hour  Every 5-7 min   Lasting 1-2 minutes    No vaginal bleeding   No abdominal pain  No fever  No leakage of fluid    Triaged to a disposition of Go to L&D now. Patient and  are agreement They will be there in 30 min. Notified L&D.     COVID 19 Nurse Triage Plan/Patient Instructions    Please be aware that novel coronavirus (COVID-19) may be circulating in the community. If you develop symptoms such as fever, cough, or SOB or if you have concerns about the presence of another infection including coronavirus (COVID-19), please contact your health care provider or visit [email or web address].     Disposition/Instructions    ED Visit recommended. Follow protocol based instructions.     Bring Your Own Device:  Please also bring your smart device(s) (smart phones, tablets, laptops) and their charging cables for your personal use and to communicate with your care team during your visit.    Thank you for taking steps to prevent the spread of this virus.  o Limit your contact with others.  o Wear a simple mask to cover your cough.  o Wash your hands well and often.    Resources    [facility]: About COVID-19: [email or web address]    CDC: What to Do If You're Sick: www.cdc.gov/coronavirus/2019-ncov/about/steps-when-sick.html    CDC: Ending Home Isolation: www.cdc.gov/coronavirus/2019-ncov/hcp/disposition-in-home-patients.html     CDC: Caring for Someone: www.cdc.gov/coronavirus/2019-ncov/if-you-are-sick/care-for-someone.html     [facility]: Interim Guidance for Hospital Discharge to Home: www.health.[facility].[place].us/diseases/coronavirus/hcp/hospdischarge.pdf    [facility] clinical trials (COVID-19 research studies): [email or web address]-trials     Below are the COVID-19 hotlines at the [place] Department of Health ([facility]). Interpreters are available.   o For  "health questions: Call 789-131-9253 or 1-213.128.3361 (7 a.m. to 7 p.m.)  o For questions about schools and childcare: Call 992-194-2101 or 1-241.925.3363 (7 a.m. to 7 p.m.)     Reason for Disposition    [1] History of prior delivery (multipara) AND [2] contractions < 10 minutes apart AND [3] present 1 hour    Additional Information    Negative: Passed out (i.e., lost consciousness, collapsed and was not responding)    Negative: Shock suspected (e.g., cold/pale/clammy skin, too weak to stand, low BP, rapid pulse)    Negative: Difficult to awaken or acting confused (e.g., disoriented, slurred speech)    Negative: [1] SEVERE abdominal pain (e.g., excruciating) AND [2] constant AND [3] present > 1 hour    Negative: Severe bleeding (e.g., continuous red blood from vagina, or large blood clots)    Negative: Umbilical cord hanging out of the vagina (shiny, white, curled appearance, \"like telephone cord\")    Negative: Uncontrollable urge to push (i.e., feels like baby is coming out now)    Negative: Can see baby    Negative: Sounds like a life-threatening emergency to the triager    Negative: Pregnant < 37 weeks (i.e., )    Negative: [1] Uncertain delivery date AND [2] possibly pregnant < 37 weeks (i.e., )    Negative: [1] First baby (primipara) AND [2] contractions < 6 minutes apart  AND [3] present 2 hours    Protocols used: PREGNANCY - LABOR-A-    Heaven Gomez RN on 10/27/2020 at 2:34 AM    "

## 2020-10-27 NOTE — PLAN OF CARE
Data: Patient presented to [facility] at 0319.   Reason for maternal/fetal assessment per patient is Rule Out Labor  .  Patient is a . Prenatal record reviewed.   Gestational Age 39w0d. VSS. Fetal movement present. Patient denies backache, vaginal discharge, pelvic pressure, UTI symptoms, GI problems, bloody show, vaginal bleeding, edema, headache, visual disturbances, epigastric or URQ pain, abdominal pain, rupture of membranes. Support person Bat present.  Action: Verbal consent for EFM. Triage assessment completed. EFM applied. Uterine assessment complete. Fetal assessment: Presumed adequate fetal oxygenation documented (see flow record).   Response: Dr. Lucas informed of status. Plan per provider is admit. Patient verbalized agreement with plan. Patient transferred to room 231 via wheelchair, oriented to room and call light. Report given to DENNY Eldridge RN.

## 2020-10-27 NOTE — PROGRESS NOTES
Fetal tachycardia has gotten worse   190s to 200  Cervix still unchaged  No bleeding seen  Proceed with csection for fetal intolerance since remote from delivery  Risks and benefits discussed  Informed consent signed

## 2020-10-27 NOTE — ANESTHESIA POSTPROCEDURE EVALUATION
Patient: Pam Thompson    Procedure(s):   SECTION    Diagnosis:* No pre-op diagnosis entered *  Diagnosis Additional Information: No value filed.    Anesthesia Type:  Epidural    Note:  Anesthesia Post Evaluation    Patient location during evaluation: Bedside  Patient participation: Able to participate in evaluation but full recovery from regional anesthesia has not yet ocurrred but is anticipated to occur within 48 hours  Level of consciousness: awake and alert  Pain management: adequate  Airway patency: patent  Cardiovascular status: acceptable  Respiratory status: acceptable  Hydration status: acceptable  PONV: none             Last vitals:  Vitals:    10/27/20 1159 10/27/20 1230 10/27/20 1330   BP: 111/66 110/60 97/51   Pulse: 77 77 81   Resp: 14 15 15   Temp: 36.9  C (98.5  F) 37  C (98.6  F) 36.8  C (98.3  F)   SpO2: 98% 97% 98%         Electronically Signed By: Butch Chowdhury MD  2020  2:02 PM

## 2020-10-27 NOTE — PLAN OF CARE
Assumed care of patient at 0720. FHTs tachy, 175 at this time. Moderate variability. Dr De La Garza at bedside to assess. SVE 6/80/-1 AROM at 0730, moderate amount clear fluid noted. Pt repositioned onto right side, peanut ball for positioning. Maternal temp 99.9 oral. Cont to monitor and assess.

## 2020-10-27 NOTE — OP NOTE
Procedure Date: 10/27/2020      PREOPERATIVE DIAGNOSIS:  Fetal intolerance of labor at 39 weeks' gestation.      PROCEDURE:  Primary low transverse  section.      POSTOPERATIVE DIAGNOSIS:  Fetal intolerance of labor at 39 weeks' gestation.      SURGEON:  Janet De La Garza MD      ANESTHESIA:  Epidural.      FINDINGS:  Female infant, baby weighed 7 pounds 11 ounces.  The head was in asynclitic presentation.  Amniotic fluid was clear.      ESTIMATED BLOOD LOSS:  716 mL by quantitative blood loss.      TECHNIQUE:  The patient was taken to the operating room and placed in supine position.  Anesthesia was readministered through her epidural.  The baby had been having persistent fetal tachycardia and was running in the 190s-200s.  The mom had not spiked a formal fever, but had a temperature around 99.  Once the patient's anesthetic was adequate and we tested, the patient had been prepped and draped and had a Palacio in place.  A timeout procedure was carried out.        We made a Pfannenstiel incision.  We entered through the subcutaneous tissue.  We made a lateral incision in the fascia, the rectus muscles were divided in the midline.  Peritoneum was incised vertically.  We took down the bladder flap with sharp dissection.  A low cervical transverse incision was made in the uterus.  The baby was delivered easily through the incision.  We did delayed cord clamping for 1 minute.  Cord gases were sent.  The baby was passed for evaluation and the placenta was delivered.  The uterus was exteriorized.  The fallopian tubes and ovaries looked entirely normal.        Uterine incision was closed with 0 Monocryl in a running locking fashion, imbricating layer was closed with 0 Monocryl in a running fashion.  We had good hemostasis.  The uterus was returned to the abdominal cavity.  We irrigated and removed clots and debris from the abdominal cavity, and the peritoneum was closed with 3-0 Vicryl in a running fashion.  The fascia  was closed with 0 Vicryl in a running fashion and the skin was closed with 4-0 Vicryl in subcuticular fashion.  Steri-Strips and dressings were applied and the patient went to the recovery room in stable condition.         OLIVAI ADAMSON MD             D: 10/27/2020   T: 10/27/2020   MT: PAOLA      Name:     PARKER GREENWOOD   MRN:      [id number]        Account:        [id number]   :      1995           Procedure Date: 10/27/2020      Document: [id number]

## 2020-10-27 NOTE — PLAN OF CARE
Data: Pam Thompson transferred to room 413 via wheelchair at 1140. Baby transferred via parent's arms.  Action: Receiving unit notified of transfer: Yes. Patient and family notified of room change. Report given to DONNA Julio at 1140. Belongings sent to receiving unit. Accompanied by Registered Nurse. Oriented patient to surroundings. Call light within reach. ID bands double-checked with receiving RN.  Response: Patient tolerated transfer and is stable.

## 2020-10-28 LAB — HGB BLD-MCNC: 10.6 G/DL (ref 11.7–15.7)

## 2020-10-28 PROCEDURE — 120N000012 HC R&B POSTPARTUM

## 2020-10-28 PROCEDURE — 85018 HEMOGLOBIN: CPT | Performed by: OBSTETRICS & GYNECOLOGY

## 2020-10-28 PROCEDURE — 250N000011 HC RX IP 250 OP 636: Performed by: OBSTETRICS & GYNECOLOGY

## 2020-10-28 PROCEDURE — 250N000009 HC RX 250: Performed by: OBSTETRICS & GYNECOLOGY

## 2020-10-28 PROCEDURE — 250N000013 HC RX MED GY IP 250 OP 250 PS 637: Performed by: OBSTETRICS & GYNECOLOGY

## 2020-10-28 PROCEDURE — 36415 COLL VENOUS BLD VENIPUNCTURE: CPT | Performed by: OBSTETRICS & GYNECOLOGY

## 2020-10-28 RX ADMIN — CLINDAMYCIN PHOSPHATE 900 MG: 900 INJECTION, SOLUTION INTRAVENOUS at 00:27

## 2020-10-28 RX ADMIN — GENTAMICIN SULFATE 100 MG: 100 INJECTION, SOLUTION INTRAVENOUS at 01:57

## 2020-10-28 RX ADMIN — ACETAMINOPHEN 975 MG: 325 TABLET, FILM COATED ORAL at 08:11

## 2020-10-28 RX ADMIN — IBUPROFEN 800 MG: 400 TABLET ORAL at 12:17

## 2020-10-28 RX ADMIN — DOCUSATE SODIUM 50 MG AND SENNOSIDES 8.6 MG 2 TABLET: 8.6; 5 TABLET, FILM COATED ORAL at 08:11

## 2020-10-28 RX ADMIN — OXYCODONE HYDROCHLORIDE 5 MG: 5 TABLET ORAL at 21:54

## 2020-10-28 RX ADMIN — OXYCODONE HYDROCHLORIDE 5 MG: 5 TABLET ORAL at 12:17

## 2020-10-28 RX ADMIN — OXYCODONE HYDROCHLORIDE 5 MG: 5 TABLET ORAL at 08:11

## 2020-10-28 RX ADMIN — DOCUSATE SODIUM 50 MG AND SENNOSIDES 8.6 MG 2 TABLET: 8.6; 5 TABLET, FILM COATED ORAL at 20:39

## 2020-10-28 RX ADMIN — IBUPROFEN 800 MG: 400 TABLET ORAL at 18:38

## 2020-10-28 RX ADMIN — OXYCODONE HYDROCHLORIDE 5 MG: 5 TABLET ORAL at 18:38

## 2020-10-28 RX ADMIN — ACETAMINOPHEN 975 MG: 325 TABLET, FILM COATED ORAL at 02:00

## 2020-10-28 RX ADMIN — ACETAMINOPHEN 975 MG: 325 TABLET, FILM COATED ORAL at 20:38

## 2020-10-28 RX ADMIN — OXYCODONE HYDROCHLORIDE 5 MG: 5 TABLET ORAL at 04:36

## 2020-10-28 NOTE — PLAN OF CARE
Vital signs stable, assessment WNL. Dressing clean, dry, and intact. Pain controlled with scheduled Toradol and Tylenol; states satisfaction with pain management. Stood at bedside with some lightheadedness. Palacio draining adequate amounts of clear, yellow urine. Working on breastfeeding, mostly sleepy attempts. Encouraged to call RN with needs. Will continue to monitor.

## 2020-10-28 NOTE — PLAN OF CARE
Fundus firm and bleeding wnl.  VSS-BP's soft & normal per patient.  Catheter in place and draining adequately with large volumes- will pull this AM.  Received anitbiotics overnight and IV SL.  Lungs clear.  Passing flatus and tolerating regular diet.  Denies pain and taking scheduled tylenol & toradol.  Ambulated to bathroom with 1 assist for kat care.  Started pumping overnight due to infant being sleepy- not getting anything at this time.  Encouraged to call with questions or concerns.  Will continue to monitor.

## 2020-10-28 NOTE — PROGRESS NOTES
S: Pt doing well. Infant is being . Pain is well controlled.    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

## 2020-10-28 NOTE — ANESTHESIA POSTPROCEDURE EVALUATION
Patient: Pam Thompson    * No procedures listed *    Diagnosis:* No pre-op diagnosis entered *  Diagnosis Additional Information: No value filed.    Anesthesia Type:  No value filed.    Note:  Anesthesia Post Evaluation    Patient location during evaluation: Bedside and Floor  Patient participation: Able to fully participate in evaluation  Level of consciousness: awake  Pain management: adequate  Airway patency: patent  Cardiovascular status: acceptable  Respiratory status: acceptable  Hydration status: acceptable  PONV: none     Anesthetic complications: None          Last vitals:  Vitals:    10/28/20 0652 10/28/20 0848 10/28/20 1838   BP:  101/58 110/63   Pulse: 82 82 87   Resp: 16 16 16   Temp:  36.8  C (98.3  F) 36.9  C (98.5  F)   SpO2: 96%           Electronically Signed By: Christiano Cruz MD  October 28, 2020  6:43 PM

## 2020-10-29 LAB
BASOPHILS # BLD AUTO: 0 10E9/L (ref 0–0.2)
BASOPHILS NFR BLD AUTO: 0.1 %
DIFFERENTIAL METHOD BLD: ABNORMAL
EOSINOPHIL # BLD AUTO: 0.2 10E9/L (ref 0–0.7)
EOSINOPHIL NFR BLD AUTO: 1.5 %
ERYTHROCYTE [DISTWIDTH] IN BLOOD BY AUTOMATED COUNT: 13.5 % (ref 10–15)
HCT VFR BLD AUTO: 31.7 % (ref 35–47)
HGB BLD-MCNC: 10.8 G/DL (ref 11.7–15.7)
IMM GRANULOCYTES # BLD: 0.1 10E9/L (ref 0–0.4)
IMM GRANULOCYTES NFR BLD: 0.8 %
LYMPHOCYTES # BLD AUTO: 2 10E9/L (ref 0.8–5.3)
LYMPHOCYTES NFR BLD AUTO: 19.3 %
MCH RBC QN AUTO: 34.6 PG (ref 26.5–33)
MCHC RBC AUTO-ENTMCNC: 34.1 G/DL (ref 31.5–36.5)
MCV RBC AUTO: 102 FL (ref 78–100)
MONOCYTES # BLD AUTO: 0.8 10E9/L (ref 0–1.3)
MONOCYTES NFR BLD AUTO: 7.8 %
NEUTROPHILS # BLD AUTO: 7.3 10E9/L (ref 1.6–8.3)
NEUTROPHILS NFR BLD AUTO: 70.5 %
NRBC # BLD AUTO: 0 10*3/UL
NRBC BLD AUTO-RTO: 0 /100
PLATELET # BLD AUTO: 234 10E9/L (ref 150–450)
RBC # BLD AUTO: 3.12 10E12/L (ref 3.8–5.2)
WBC # BLD AUTO: 10.4 10E9/L (ref 4–11)

## 2020-10-29 PROCEDURE — 120N000012 HC R&B POSTPARTUM

## 2020-10-29 PROCEDURE — 36415 COLL VENOUS BLD VENIPUNCTURE: CPT | Performed by: OBSTETRICS & GYNECOLOGY

## 2020-10-29 PROCEDURE — 250N000013 HC RX MED GY IP 250 OP 250 PS 637: Performed by: OBSTETRICS & GYNECOLOGY

## 2020-10-29 PROCEDURE — 85025 COMPLETE CBC W/AUTO DIFF WBC: CPT | Performed by: OBSTETRICS & GYNECOLOGY

## 2020-10-29 RX ADMIN — IBUPROFEN 800 MG: 400 TABLET ORAL at 08:03

## 2020-10-29 RX ADMIN — ACETAMINOPHEN 975 MG: 325 TABLET, FILM COATED ORAL at 08:03

## 2020-10-29 RX ADMIN — ACETAMINOPHEN 975 MG: 325 TABLET, FILM COATED ORAL at 21:12

## 2020-10-29 RX ADMIN — IBUPROFEN 800 MG: 400 TABLET ORAL at 00:21

## 2020-10-29 RX ADMIN — DOCUSATE SODIUM 50 MG AND SENNOSIDES 8.6 MG 2 TABLET: 8.6; 5 TABLET, FILM COATED ORAL at 21:12

## 2020-10-29 RX ADMIN — ACETAMINOPHEN 975 MG: 325 TABLET, FILM COATED ORAL at 15:07

## 2020-10-29 RX ADMIN — IBUPROFEN 800 MG: 400 TABLET ORAL at 15:07

## 2020-10-29 NOTE — PLAN OF CARE
VSS, fundus firm with no free flow or clots.  Incision is covered by dressing is C/D/I.  Pt is independent with  cares and nursing.  Enc to call with needs, questions and concerns.

## 2020-10-29 NOTE — PROGRESS NOTES
[facility]    Obstetrics Post-Op / Progress Note    Assessment & Plan   Assessment:  -2 Days Post-Op  Procedure(s):   SECTION    Doing well.  Clean wound without signs of infection.  No excessive bleeding  Pain well-controlled.    Plan:  Ambulation encouraged  Lactation consultation  Monitor wound for signs of infection  Pain control measures as needed  Reportable signs and symptoms dicussed with the patient  Anticipate discharge in 1-2 days    Bruna Oakley     Interval History   Doing well.  Pain is well-controlled with oral meds.  Mild fever to 100.5 overnight --repeat temps all normal and WBC normal this AM.  No history of wound drainage, warmth or significant erythema.  Good appetite.  Denies chest pain, shortness of breath, nausea or vomiting.  Ambulatory.  Breast and bottle feeding.  Had mild headache last night --resolved this AM    Medications     dextrose 5% lactated ringers Stopped (10/28/20 0312)     - MEDICATION INSTRUCTIONS -       NO Rho (D) immune globulin (RhoGam) needed - mother Rh POSITIVE       - MEDICATION INSTRUCTIONS -       oxytocin in 0.9% NaCl 100 mL/hr (10/27/20 1310)     oxytocin in 0.9% NaCl         acetaminophen  975 mg Oral Q6H     senna-docusate  1 tablet Oral BID    Or     senna-docusate  2 tablet Oral BID     sodium chloride (PF)  3 mL Intracatheter Q8H       Physical Exam   Temp: 98.1  F (36.7  C) Temp src: Oral BP: 102/63 Pulse: 85   Resp: 16        There were no vitals filed for this visit.  Vital Signs with Ranges  Temp:  [98.1  F (36.7  C)-100.5  F (38.1  C)] 98.1  F (36.7  C)  Pulse:  [82-92] 85  Resp:  [16] 16  BP: (102-123)/(63-79) 102/63  I/O last 3 completed shifts:  In: -   Out: 800 [Urine:800]    Uterine fundus is firm, non-tender and at the level of the umbilicus  Incision C/D/I  Extremities Non-tender    Data   Recent Labs   Lab Test 10/27/20  0421   ABO A   RH Pos   AS Neg     Recent Labs   Lab Test 10/29/20  7695 10/28/20  3314    HGB 10.8* 10.6*     Recent Labs   Lab Test 03/26/20  1200   RUQIGG 32

## 2020-10-29 NOTE — PLAN OF CARE
Vss, Pt has not had a fever today. Fundus firm with scant flow. Incision intact with steri strips. Pain managed with tylenol and ibuprofen. Pt breast feeding  well, milk is starting to come in. Spouse at bedside and supportive. Encouraged to call with questions/needs.

## 2020-10-29 NOTE — PROVIDER NOTIFICATION
10/28/20 0123   Provider Notification   Provider Name/Title Dr. De La Garza   Method of Notification Phone   Request Evaluate-Remote   Notification Reason Vital Signs Change   MD notified about temperature of 100.5 after tylenol. Via [facility]OR[facility], Dr. De La Garza ordered a CBC with platelet differential tomorrow (10/29 0600). MD would like a temperature recheck at 0400, and to be called for a temperature of 100.4 or greater.

## 2020-10-29 NOTE — PLAN OF CARE
Temps .5 (see provider notification). Temperature recheck WDL. Pt initially complained of a 8/10 headache, however, it improved with medication, caffeine and rest. Postpartum assessment WDL. Incision CDI, plan to remove this AM. Pain controlled with tylenol, ibuprofen and oxycodone PRN.  Patient ambulating independently, voiding, passing gas. Breastfeeding on cue with minimal assist. Did not pump overnight as infant was cluster feeding. Patient and infant bonding well. Will continue with current plan of care, 0600 labs.

## 2020-10-29 NOTE — LACTATION NOTE
Attempted Lactation routine check-in visit. Per report, baby breastfeeding well, cluster feeding at times. Supplementing per parents' choice at times with formula. Jargalzaya sleeping at time of visit. Lactation will attempt check-in again later today.    Sue Sofia RN-C, IBCLC, MNN, PHN, BSN

## 2020-10-30 PROCEDURE — 120N000012 HC R&B POSTPARTUM

## 2020-10-30 PROCEDURE — 250N000013 HC RX MED GY IP 250 OP 250 PS 637: Performed by: OBSTETRICS & GYNECOLOGY

## 2020-10-30 RX ADMIN — ACETAMINOPHEN 650 MG: 325 TABLET, FILM COATED ORAL at 21:34

## 2020-10-30 RX ADMIN — DOCUSATE SODIUM 50 MG AND SENNOSIDES 8.6 MG 2 TABLET: 8.6; 5 TABLET, FILM COATED ORAL at 09:25

## 2020-10-30 RX ADMIN — ACETAMINOPHEN 650 MG: 325 TABLET, FILM COATED ORAL at 15:23

## 2020-10-30 RX ADMIN — ACETAMINOPHEN 975 MG: 325 TABLET, FILM COATED ORAL at 09:25

## 2020-10-30 RX ADMIN — IBUPROFEN 800 MG: 400 TABLET ORAL at 15:24

## 2020-10-30 RX ADMIN — IBUPROFEN 800 MG: 400 TABLET ORAL at 21:34

## 2020-10-30 RX ADMIN — ACETAMINOPHEN 975 MG: 325 TABLET, FILM COATED ORAL at 03:16

## 2020-10-30 RX ADMIN — DOCUSATE SODIUM 50 MG AND SENNOSIDES 8.6 MG 2 TABLET: 8.6; 5 TABLET, FILM COATED ORAL at 21:34

## 2020-10-30 RX ADMIN — IBUPROFEN 800 MG: 400 TABLET ORAL at 09:25

## 2020-10-30 NOTE — PROGRESS NOTES
Obstetrics Postpartum Rounding Note, POD#3    S: Doing well. Pain ok controlled, having cramping. Juan Carlos PO. Ambulating some. Minimal lochia. No BM but passing gas. Would like to stay until POD#4      O:   Vitals:    10/29/20 2248 10/30/20 0815 10/30/20 0925 10/30/20 1020   BP: 115/72 111/68     Pulse: 67 85     Resp: 16 16 16 16   Temp: 98.1  F (36.7  C) 98  F (36.7  C)     TempSrc: Oral Oral     SpO2:           Gen: AOx3, NAD  Chest: nonlabored breathing  Abd: soft, ND, mild approp ttp, FF@ U-2. Incision intact with steri strips, no discharge/erythema  Ext: no calf ttp, no edema    Labs:         Hemoglobin   Date Value Ref Range Status   10/29/2020 10.8 (L) 11.7 - 15.7 g/dL Final   10/28/2020 10.6 (L) 11.7 - 15.7 g/dL Final            Lab Results   Component Value Date    RH Pos 10/27/2020       Meds:  Current Facility-Administered Medications   Medication     acetaminophen (TYLENOL) tablet 650 mg     acetaminophen (TYLENOL) tablet 975 mg     bisacodyl (DULCOLAX) Suppository 10 mg     carboprost (HEMABATE) injection 250 mcg     dextrose 5% in lactated ringers infusion     hydrocortisone 2.5 % cream     HYDROmorphone (PF) (DILAUDID) injection 0.3-0.5 mg     ibuprofen (ADVIL/MOTRIN) tablet 800 mg     lactated ringers BOLUS 1,000 mL     lanolin cream     lidocaine (LMX4) cream     lidocaine 1 % 0.1-1 mL     methylergonovine (METHERGINE) injection 200 mcg     misoprostol (CYTOTEC) tablet 400 mcg     naloxone (NARCAN) injection 0.1-0.4 mg     No MMR Needed -  Assessment: Patient does not need MMR vaccine     NO Rho (D) immune globulin (RhoGam) needed - mother Rh POSITIVE     No Tdap Needed - Assessment: Patient does not need Tdap vaccine     ondansetron (ZOFRAN) injection 4 mg     oxyCODONE (ROXICODONE) tablet 5 mg     oxytocin (PITOCIN) 30 units in 500 mL 0.9% NaCl infusion     oxytocin (PITOCIN) 30 units in 500 mL 0.9% NaCl infusion     oxytocin (PITOCIN) injection 10 Units     senna-docusate (SENOKOT-S/PERICOLACE)  8.6-50 MG per tablet 1 tablet    Or     senna-docusate (SENOKOT-S/PERICOLACE) 8.6-50 MG per tablet 2 tablet     simethicone (MYLICON) chewable tablet 80 mg     sodium chloride (PF) 0.9% PF flush 3 mL     sodium chloride (PF) 0.9% PF flush 3 mL     sodium phosphate (FLEET ENEMA) 1 enema     tranexamic acid 1 g in 100 mL 0.7% NaCl IV bag (premix)       A/P: POD#3 s/p 1LTCS doing well overall.  - Desires to stay additional day  -BPs wnl  -Continue routine care.  -Anticipate discharge tomorrow    Uzma Howard Masters, DO  October 30, 2020

## 2020-10-30 NOTE — PLAN OF CARE
Vital signs stable. Postpartum assessment WDL. Incision WDL. Pain controlled with tylenol, declining ibuprofen and oxycodone. Patient ambulating independently. Patient passing gas, tolerating regular diet, voiding.  Breastfeeding on cue with minimal assist, feels that her milk is coming in. Patient and infant bonding well. Will continue with current plan of care, plan to discharge today.

## 2020-10-30 NOTE — PLAN OF CARE
Vital signs stable. Voiding without difficulty. Lung sounds clear and equal. This morning Lydia's pain got up to an 8. She was reluctant to take anything more then tylenol, however, I was able to get her to take ibuprofen. Pain has since been better controlled and patient is now able to ambulate in her room more comfortably. Working on breastfeeding every 2-3 hours, milk is in. Questions/concerns addressed.

## 2020-10-30 NOTE — LACTATION NOTE
Lactation check-in prior to discharge. Infant latched on and feeding at time of visit. Nutritive suckle with frequent, audible swallows. Milk visible at lips;  Lydia's milk is coming in. Breasts soft at this time, but discuss how to deal with engorgement. Questions answered about milk storage & supplementation.    To continue monitoring infant input/output at home; tracking in log book.  Prior hx of positive breastfeeding experience;  prior children for 2+ years. Lydia and  deny having any questions or concerns.    Mirian Lam, RN, IBCLC

## 2020-10-31 VITALS
TEMPERATURE: 98.1 F | RESPIRATION RATE: 16 BRPM | DIASTOLIC BLOOD PRESSURE: 56 MMHG | SYSTOLIC BLOOD PRESSURE: 113 MMHG | HEART RATE: 87 BPM | OXYGEN SATURATION: 96 %

## 2020-10-31 PROCEDURE — 250N000013 HC RX MED GY IP 250 OP 250 PS 637: Performed by: OBSTETRICS & GYNECOLOGY

## 2020-10-31 RX ORDER — OXYCODONE HYDROCHLORIDE 5 MG/1
5 TABLET ORAL
Qty: 18 TABLET | Refills: 0 | Status: SHIPPED | OUTPATIENT
Start: 2020-10-31 | End: 2021-07-29

## 2020-10-31 RX ORDER — IBUPROFEN 800 MG/1
800 TABLET, FILM COATED ORAL EVERY 6 HOURS PRN
Qty: 30 TABLET | Refills: 0 | Status: SHIPPED | OUTPATIENT
Start: 2020-10-31 | End: 2021-07-29

## 2020-10-31 RX ORDER — BREAST PUMP
1 EACH MISCELLANEOUS ONCE
Qty: 1 EACH | Refills: 0 | Status: SHIPPED | OUTPATIENT
Start: 2020-10-31 | End: 2020-10-31

## 2020-10-31 RX ADMIN — ACETAMINOPHEN 650 MG: 325 TABLET, FILM COATED ORAL at 08:41

## 2020-10-31 RX ADMIN — IBUPROFEN 800 MG: 400 TABLET ORAL at 08:41

## 2020-10-31 RX ADMIN — DOCUSATE SODIUM 50 MG AND SENNOSIDES 8.6 MG 1 TABLET: 8.6; 5 TABLET, FILM COATED ORAL at 08:41

## 2020-10-31 NOTE — PLAN OF CARE
VSS. Fundus firm, midline U/2 with scant flow. Incision C/D/I with steri strips. Ambulating in room independently.  Pain well controlled with tylenol/ibuprofen calls when in need. Working on breastfeeding  and  cares.  Progressing per care plan. Continue to monitor and notify MD as needed.

## 2020-10-31 NOTE — LACTATION NOTE
Lactation check-in prior to discharge. Per Lydia, breastfeeding is going well. Her milk is in; infant having yellow-tinted stools. Does not yet have a breastpump for home use, but would like one. Planning to take prescription home and check with insurance; this is her first child born in the [place] and unsure about insurance coverage. Recommend [employer] as a resource for getting breastpump/checking insurance; otherwise can call insurance on Monday.    Planning to pump one more time before discharge,  to have extra milk on hand. Appreciative of support. No further questions.    Mirian Lam, RN, IBCLC

## 2020-10-31 NOTE — PLAN OF CARE
Vital signs stable. Incision intact with steri strips. Voiding without difficulty. Lung sounds clear and equal. Using tylenol/ibuprofen for pain management. Up ambulating free of dizziness. Working on breastfeeding every 2-3 hours. Pumping. Planning on discharging home today. Breast pump script sent with patient. Discharge medications discussed. Instructions/follow up discussed. Questions/concerns addressed.

## 2020-10-31 NOTE — PROGRESS NOTES
Pam Thompson  October 31, 2020    S: pt is doing well.  Tolerating po intake and pain is well controlled.  Ambulating.  Decreasing lochia.     O:/76   Pulse 80   Temp 98.4  F (36.9  C) (Oral)   Resp 16   LMP 01/28/2020   SpO2 96%   Breastfeeding Unknown   Recent Labs   Lab 10/29/20  0651 10/28/20  0832 10/27/20  0421   HGB 10.8* 10.6* 12.4     Abdomen: soft, non distended, fundus firm below the umbilicus.  Incision is C/D/I  Ext: non tender, no edema or erythema    A/P: s/p LTCS POD #3  Doing well  Continue care  Discharge planning for today    Soha Uribe MD

## 2020-11-10 ENCOUNTER — OFFICE VISIT (OUTPATIENT)
Dept: OBGYN | Facility: CLINIC | Age: 25
End: 2020-11-10
Payer: COMMERCIAL

## 2020-11-10 VITALS — DIASTOLIC BLOOD PRESSURE: 60 MMHG | WEIGHT: 123 LBS | BODY MASS INDEX: 24.02 KG/M2 | SYSTOLIC BLOOD PRESSURE: 112 MMHG

## 2020-11-10 PROCEDURE — 99024 POSTOP FOLLOW-UP VISIT: CPT | Performed by: OBSTETRICS & GYNECOLOGY

## 2020-11-10 NOTE — PROGRESS NOTES
SUBJECTIVE:                                                   Pam Thompson is a 24 year old female who presents to clinic today for the following health issue(s):  Patient presents with:  Follow Up    HPI:  Incision looks good  2 wks post partum  Says she is feeling better  Breast feeding  Wondering about contraception    Patient's last menstrual period was 2020..     Patient is not sexually active, .  Using not sexually active for contraception.    reports that she has never smoked. She has never used smokeless tobacco.    STD testing offered?  Declined    Health maintenance updated:  yes    Problem list and histories reviewed & adjusted, as indicated.  Additional history: as documented.    Patient Active Problem List   Diagnosis     Supervision of normal intrauterine pregnancy in multigravida     Indication for care in labor or delivery     Past Surgical History:   Procedure Laterality Date     APPENDECTOMY        SECTION N/A 10/27/2020    Procedure:  SECTION;  Surgeon: Janet De La Garza MD;  Location:  L+D      Social History     Tobacco Use     Smoking status: Never Smoker     Smokeless tobacco: Never Used   Substance Use Topics     Alcohol use: Not Currently      Problem (# of Occurrences) Relation (Name,Age of Onset)    Diabetes (1) Father            Current Outpatient Medications   Medication Sig     ibuprofen (ADVIL/MOTRIN) 800 MG tablet Take 1 tablet (800 mg) by mouth every 6 hours as needed for other (cramping)     Prenat w/o Q-HO-Vqtsais-FA-DHA (PNV-DHA PO)      oxyCODONE (ROXICODONE) 5 MG tablet Take 1 tablet (5 mg) by mouth every 3 hours as needed for moderate to severe pain (Patient not taking: Reported on 11/10/2020)     No current facility-administered medications for this visit.      Allergies   Allergen Reactions     Watermelon Flavor Anaphylaxis     Lip and face swelling.     Bananas [Banana]      Swollen lips and tongue     Cefazolin Itching      Cephalosporins      Pineapple      Swollen lips and tongue       ROS:  12 point review of systems negative other than symptoms noted below or in the HPI.  No urinary frequency or dysuria, bladder or kidney problems      OBJECTIVE:     /60   Wt 55.8 kg (123 lb)   LMP 01/28/2020   Breastfeeding No   BMI 24.02 kg/m    Body mass index is 24.02 kg/m .    Exam:  Constitutional:  Appearance: Well nourished, well developed alert, in no acute distress      Knot removed from corner of incision, no redness or induration    In-Clinic Test Results:  No results found for this or any previous visit (from the past 24 hour(s)).    ASSESSMENT/PLAN:                                                      No diagnosis found.    There are no Patient Instructions on file for this visit.    Given info to read on mirena iud  Common alternative would be progestin only ocp  Will decide at her 6 week pp check up  Return 4 wks  Incision healing well      Janet De La Garza MD  [facility] [place]

## 2020-12-02 ENCOUNTER — PRENATAL OFFICE VISIT (OUTPATIENT)
Dept: OBGYN | Facility: CLINIC | Age: 25
End: 2020-12-02
Payer: COMMERCIAL

## 2020-12-02 VITALS
DIASTOLIC BLOOD PRESSURE: 60 MMHG | HEART RATE: 62 BPM | HEIGHT: 60 IN | WEIGHT: 123 LBS | SYSTOLIC BLOOD PRESSURE: 108 MMHG | BODY MASS INDEX: 24.15 KG/M2

## 2020-12-02 DIAGNOSIS — N89.8 VAGINAL DRYNESS: ICD-10-CM

## 2020-12-02 DIAGNOSIS — Z30.011 OCP (ORAL CONTRACEPTIVE PILLS) INITIATION: ICD-10-CM

## 2020-12-02 PROBLEM — Z34.80 SUPERVISION OF NORMAL INTRAUTERINE PREGNANCY IN MULTIGRAVIDA: Status: RESOLVED | Noted: 2020-03-26 | Resolved: 2020-12-02

## 2020-12-02 PROCEDURE — 99207 PR POST PARTUM EXAM: CPT | Performed by: OBSTETRICS & GYNECOLOGY

## 2020-12-02 RX ORDER — ACETAMINOPHEN AND CODEINE PHOSPHATE 120; 12 MG/5ML; MG/5ML
0.35 SOLUTION ORAL DAILY
Qty: 84 TABLET | Refills: 3 | Status: SHIPPED | OUTPATIENT
Start: 2020-12-02 | End: 2021-07-29

## 2020-12-02 RX ORDER — ESTRADIOL 0.1 MG/G
1 CREAM VAGINAL
Qty: 42.5 G | Refills: 3 | Status: SHIPPED | OUTPATIENT
Start: 2020-12-03 | End: 2021-07-29

## 2020-12-02 RX ORDER — ACETAMINOPHEN AND CODEINE PHOSPHATE 120; 12 MG/5ML; MG/5ML
0.35 SOLUTION ORAL DAILY
COMMUNITY
End: 2021-07-29

## 2020-12-02 ASSESSMENT — MIFFLIN-ST. JEOR: SCORE: 1224.42

## 2020-12-02 ASSESSMENT — ANXIETY QUESTIONNAIRES
2. NOT BEING ABLE TO STOP OR CONTROL WORRYING: NOT AT ALL
GAD7 TOTAL SCORE: 1
5. BEING SO RESTLESS THAT IT IS HARD TO SIT STILL: NOT AT ALL
3. WORRYING TOO MUCH ABOUT DIFFERENT THINGS: NOT AT ALL
1. FEELING NERVOUS, ANXIOUS, OR ON EDGE: NOT AT ALL
6. BECOMING EASILY ANNOYED OR IRRITABLE: SEVERAL DAYS
7. FEELING AFRAID AS IF SOMETHING AWFUL MIGHT HAPPEN: NOT AT ALL

## 2020-12-02 ASSESSMENT — PATIENT HEALTH QUESTIONNAIRE - PHQ9
SUM OF ALL RESPONSES TO PHQ QUESTIONS 1-9: 2
5. POOR APPETITE OR OVEREATING: NOT AT ALL

## 2020-12-02 NOTE — PROGRESS NOTES
SUBJECTIVE:  Pam Thompson,  is here for a postpartum visit.  She had a  Section  on 10/27/2020 delivering a healthy baby girl, named Ifrah weighing 7 lbs 11 oz at term.      HPI:  Patient had c section for fetal intolerance when she was in active labor.    She had 2 prior kids.   Now she has not tried to have intercourse but has a lot of dryness and stinging around vaginal opening.   No discharge, itching or odor    Also wants to start ocp for now but is interested in an iud, not sure whether hormonal or paraguard.   The IUD without hormone is likely to make periods heavy although probably not while she is still breast feeding. Breast feeing going well        Incision looks great and no symptoms from it  Last PHQ-9 score on record=   PHQ-9 SCORE 2020   PHQ-9 Total Score 2     KATIE-7 SCORE 3/26/2020 2020   Total Score 0 1       [facility]    Delivery complications:  No  Breast feeding:  Yes,   Bladder problems:  No  Bowel problems/hemorrhoids:  No  Episiotomy/laceration/incision healed? 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  Genitourinary: Vaginal Dryness    OBJECTIVE:  Vitals: /60   Pulse 62   Ht 1.524 m (5')   Wt 55.8 kg (123 lb)   LMP 2020   BMI 24.02 kg/m    BMI= Body mass index is 24.02 kg/m .  General - pleasant female in no acute distress.  Breast -  deferred  Abdomen - Incision well-healed  Pelvic - EG: normal adult female, BUS: within normal limits, Vagina: well rugated, no discharge, Cervix: no lesions or CMT, Uterus: firm, normal sized and nontender, anteverted in position. Adnexae: no masses or tenderness.  Rectovaginal - deferred.    ASSESSMENT:  No diagnosis found.    PLAN:    Vaginal opening looks pink and vagina is very dry on exam.  May be from low  estrogen levels while breast feeding. Will try twice a week vaginal estrogen cream to see if we can improve her symptoms.  No sign of vaginitis seen on exam.  No discharge in vagina.     May resume normal activities without restrictions.  Pap smear was not done today. Not due till 2022.   Order placed for micronor for now  Patient to call and schedule iud insertion for which IUD she prefers.  Sent home info on mirena and paraguard to read.     Prescription to start for contraception - micornor which is progestin only ocp, safe to use with breast feeding.   She can do that till she decides which iud she wants and then we can bring her back for IUD insertion appt.     Full counseling was provided, and all questions were answered.   Return to clinic in one year for an annual visit.     There are no Patient Instructions on file for this visit.  Janet De La Garza MD

## 2020-12-02 NOTE — PROGRESS NOTES
"  SUBJECTIVE:  Pam Thompson,  is here for a postpartum visit.  She had a {DELIVERY MODE:014093::\"\"} on *** delivering a healthy baby {BOY/GIRL:591809}, named *** weighing {NUMBERS 1-16:10} lbs {NUMBERS 1-16:10} oz at term.      HPI:  ***  Last PHQ-9 score on record=   PHQ-9 SCORE 3/26/2020   PHQ-9 Total Score 10     KATIE-7 SCORE 3/26/2020   Total Score 0       Pap: (No results found for: PAP )  ***    Delivery complications:  {Yes /No is default:569616::\"No\"}  Breast feeding:  {Yes /No is default:866422::\"No\"}  Bladder problems:  {Yes /No is default:603798::\"No\"}  Bowel problems/hemorrhoids:  {Yes /No is default:239807::\"No\"}  Episiotomy/laceration/incision healed? {YES/NO  Comments (DEFAULT IS YES):428837}  Vaginal flow:  {NONESTARS:440588::\"None\"}  Devers:  {Yes /No is default:825260::\"No\"}  Contraception: {[facility] CONTRACEPTION:495809}  Emotional adjustment:  {WELL:312451::\"doing well\",\"happy\"}  Back to work:  ***    {PLC ROSGYN:847915::\"12 point review of systems negative other than symptoms noted below or in the HPI.\"}    OBJECTIVE:  Vitals: LMP 2020   BMI= There is no height or weight on file to calculate BMI.  General - pleasant female in no acute distress.  Breast -  {PP BREAST:359328}  Abdomen - {PP ABDOMEN:903640}  Pelvic - EG: normal adult female, BUS: within normal limits, Vagina: well rugated, no discharge, Cervix: no lesions or CMT, Uterus: firm, normal sized and nontender, {Uterine Position:959943} in position. Adnexae: no masses or tenderness.  Rectovaginal - deferred.    ASSESSMENT:  No diagnosis found.    PLAN:  May resume normal activities without restrictions.  Pap smear {WAS/WAS NOT:964730::\"was not\"} done today.    Full counseling was provided, and all questions were answered.   Return to clinic in one year for an annual visit.   ***  There are no Patient Instructions on file for this visit.  Janet De La Garza MD      "

## 2020-12-03 ASSESSMENT — ANXIETY QUESTIONNAIRES: GAD7 TOTAL SCORE: 1

## 2020-12-14 NOTE — DISCHARGE SUMMARY
Admit Date:     10/27/2020   Discharge Date:     10/31/2020      HOSPITAL COURSE:  This patient was a  3, para 2, at 39 weeks, who presented in labor.  She developed fetal intolerance of labor based on review of fetal tracings during her labor and she was taken for a .  She delivered a female infant weighing 7 pounds 11 ounces.  She did well postoperatively.  There were no complications.  Her discharge hemoglobin was 10.8, and she went home on 10/31/2020, to return for a postpartum checkup at 6 weeks.         OLIVIA ADAMSON MD             D: 2020   T: 2020   MT: JOSE ANTONIO      Name:     PARKER GREENWOOD   MRN:      [id number]        Account:        [id number]   :      1995           Admit Date:     10/27/2020                                  Discharge Date: 10/31/2020      Document: [id number]

## 2020-12-30 ENCOUNTER — MYC MEDICAL ADVICE (OUTPATIENT)
Dept: OBGYN | Facility: CLINIC | Age: 25
End: 2020-12-30

## 2020-12-30 NOTE — TELEPHONE ENCOUNTER
She is allergic to cephalosporins so probably penicillins also since there is cross reactivity.     Recommended alternative abx for this is erythromycin 500 mg bid for 10 days    I sent prescription for mastitis.  It may upset her stomach.     She should come in to see us in clinic if not improving.    We could change to bactrim if needed, since baby is over 1 month old. We don't use sulfa type antibiotics till baby over 1 month of age.

## 2021-04-22 ENCOUNTER — IMMUNIZATION (OUTPATIENT)
Dept: PEDIATRICS | Facility: CLINIC | Age: 26
End: 2021-04-22
Payer: COMMERCIAL

## 2021-04-22 PROCEDURE — 0001A PR COVID VAC PFIZER DIL RECON 30 MCG/0.3 ML IM: CPT

## 2021-04-22 PROCEDURE — 91300 PR COVID VAC PFIZER DIL RECON 30 MCG/0.3 ML IM: CPT

## 2021-05-13 ENCOUNTER — IMMUNIZATION (OUTPATIENT)
Dept: PEDIATRICS | Facility: CLINIC | Age: 26
End: 2021-05-13
Attending: INTERNAL MEDICINE
Payer: COMMERCIAL

## 2021-05-13 PROCEDURE — 0002A PR COVID VAC PFIZER DIL RECON 30 MCG/0.3 ML IM: CPT

## 2021-05-13 PROCEDURE — 91300 PR COVID VAC PFIZER DIL RECON 30 MCG/0.3 ML IM: CPT

## 2021-07-29 ENCOUNTER — TELEPHONE (OUTPATIENT)
Dept: FAMILY MEDICINE | Facility: CLINIC | Age: 26
End: 2021-07-29

## 2021-07-29 ENCOUNTER — OFFICE VISIT (OUTPATIENT)
Dept: FAMILY MEDICINE | Facility: CLINIC | Age: 26
End: 2021-07-29
Payer: COMMERCIAL

## 2021-07-29 ENCOUNTER — MYC MEDICAL ADVICE (OUTPATIENT)
Dept: FAMILY MEDICINE | Facility: CLINIC | Age: 26
End: 2021-07-29

## 2021-07-29 VITALS
WEIGHT: 107 LBS | BODY MASS INDEX: 20.2 KG/M2 | HEART RATE: 73 BPM | HEIGHT: 61 IN | SYSTOLIC BLOOD PRESSURE: 112 MMHG | OXYGEN SATURATION: 96 % | DIASTOLIC BLOOD PRESSURE: 66 MMHG | TEMPERATURE: 97.5 F

## 2021-07-29 DIAGNOSIS — Z13.1 SCREENING FOR DIABETES MELLITUS: ICD-10-CM

## 2021-07-29 DIAGNOSIS — Z11.59 ENCOUNTER FOR HEPATITIS C SCREENING TEST FOR LOW RISK PATIENT: ICD-10-CM

## 2021-07-29 DIAGNOSIS — Z13.220 SCREENING FOR LIPID DISORDERS: ICD-10-CM

## 2021-07-29 DIAGNOSIS — Z00.00 ROUTINE GENERAL MEDICAL EXAMINATION AT A HEALTH CARE FACILITY: Primary | ICD-10-CM

## 2021-07-29 DIAGNOSIS — Z01.82 ENCOUNTER FOR ALLERGY TESTING: ICD-10-CM

## 2021-07-29 DIAGNOSIS — Z11.4 SCREENING FOR HIV (HUMAN IMMUNODEFICIENCY VIRUS): ICD-10-CM

## 2021-07-29 LAB — HBA1C MFR BLD: 5.3 % (ref 0–5.6)

## 2021-07-29 PROCEDURE — 83036 HEMOGLOBIN GLYCOSYLATED A1C: CPT | Performed by: NURSE PRACTITIONER

## 2021-07-29 PROCEDURE — 99385 PREV VISIT NEW AGE 18-39: CPT | Performed by: NURSE PRACTITIONER

## 2021-07-29 PROCEDURE — 36415 COLL VENOUS BLD VENIPUNCTURE: CPT | Performed by: NURSE PRACTITIONER

## 2021-07-29 PROCEDURE — 86803 HEPATITIS C AB TEST: CPT | Performed by: NURSE PRACTITIONER

## 2021-07-29 PROCEDURE — 80053 COMPREHEN METABOLIC PANEL: CPT | Performed by: NURSE PRACTITIONER

## 2021-07-29 PROCEDURE — 87389 HIV-1 AG W/HIV-1&-2 AB AG IA: CPT | Performed by: NURSE PRACTITIONER

## 2021-07-29 PROCEDURE — 80061 LIPID PANEL: CPT | Performed by: NURSE PRACTITIONER

## 2021-07-29 ASSESSMENT — ENCOUNTER SYMPTOMS
BREAST MASS: 0
CONSTIPATION: 1

## 2021-07-29 ASSESSMENT — MIFFLIN-ST. JEOR: SCORE: 1162.73

## 2021-07-29 NOTE — TELEPHONE ENCOUNTER
Biometric form received via [employer] and given to team for appts and can be completed by TC once they result. Pt needs to finish filling in information.   Kasia Lara

## 2021-07-29 NOTE — PROGRESS NOTES
SUBJECTIVE:   CC: Pam Thompson is an 25 year old woman who presents for preventive health visit.   Patient has been advised of split billing requirements and indicates understanding: Yes     Healthy Habits:     Getting at least 3 servings of Calcium per day:  Yes    Bi-annual eye exam:  NO    Dental care twice a year:  Yes    Sleep apnea or symptoms of sleep apnea:  None    Diet:  Regular (no restrictions)    Frequency of exercise:  2-3 days/week    Duration of exercise:  15-30 minutes    Taking medications regularly:  Yes    Barriers to taking medications:  None    Medication side effects:  None    PHQ-2 Total Score: 0    Additional concerns today:  No        Today's PHQ-2 Score:   PHQ-2 ( 1999 [employer]) 7/29/2021   Q1: Little interest or pleasure in doing things 0   Q2: Feeling down, depressed or hopeless 0   PHQ-2 Score 0   Q1: Little interest or pleasure in doing things Not at all   Q2: Feeling down, depressed or hopeless Not at all   PHQ-2 Score 0       Abuse: Current or Past (Physical, Sexual or Emotional) - No  Do you feel safe in your environment? Yes    Have you ever done Advance Care Planning? (For example, a Health Directive, POLST, or a discussion with a medical provider or your loved ones about your wishes): No, advance care planning information given to patient to review.  Advanced care planning was discussed at today's visit.    Social History     Tobacco Use     Smoking status: Never Smoker     Smokeless tobacco: Never Used   Substance Use Topics     Alcohol use: Not Currently     If you drink alcohol do you typically have >3 drinks per day or >7 drinks per week? No    Alcohol Use 7/29/2021   Prescreen: >3 drinks/day or >7 drinks/week? No   Prescreen: >3 drinks/day or >7 drinks/week? -   No flowsheet data found.    Reviewed orders with patient.  Reviewed health maintenance and updated orders accordingly - Yes  Lab work is in process    Breast Cancer Screening:  Any new diagnosis of family  "breast, ovarian, or bowel cancer? No    FHS-7: No flowsheet data found.  click delete button to remove this line now  Patient under 40 years of age: Routine Mammogram Screening not recommended.   Pertinent mammograms are reviewed under the imaging tab.    History of abnormal Pap smear: NO - age 21-29 PAP every 3 years recommended     Reviewed and updated as needed this visit by clinical staff  Tobacco  Allergies  Meds  Problems  Med Hx  Surg Hx  Fam Hx          Reviewed and updated as needed this visit by Provider  Tobacco  Allergies  Meds  Problems  Med Hx  Surg Hx  Fam Hx             Review of Systems  CONSTITUTIONAL: NEGATIVE for fever, chills, change in weight  INTEGUMENTARU/SKIN: NEGATIVE for worrisome rashes, moles or lesions  EYES: NEGATIVE for vision changes or irritation  ENT: NEGATIVE for ear, mouth and throat problems  RESP: NEGATIVE for significant cough or SOB  BREAST: NEGATIVE for masses, tenderness or discharge  CV: NEGATIVE for chest pain, palpitations or peripheral edema  GI: NEGATIVE for nausea, abdominal pain, heartburn, or change in bowel habits  : NEGATIVE for unusual urinary or vaginal symptoms. Periods are regular.  MUSCULOSKELETAL: NEGATIVE for significant arthralgias or myalgia  NEURO: NEGATIVE for weakness, dizziness or paresthesias  PSYCHIATRIC: NEGATIVE for changes in mood or affect     OBJECTIVE:   /66   Pulse 73   Temp 97.5  F (36.4  C) (Tympanic)   Ht 1.549 m (5' 1\")   Wt 48.5 kg (107 lb)   SpO2 96%   BMI 20.22 kg/m    Physical Exam  GENERAL: healthy, alert and no distress  EYES: Eyes grossly normal to inspection, PERRL and conjunctivae and sclerae normal  HENT: ear canals and TM's normal, nose and mouth without ulcers or lesions  NECK: no adenopathy, no asymmetry, masses, or scars and thyroid normal to palpation  RESP: lungs clear to auscultation - no rales, rhonchi or wheezes  CV: regular rate and rhythm, normal S1 S2, no S3 or S4, no murmur, click or rub, " "no peripheral edema and peripheral pulses strong  ABDOMEN: soft, nontender, no hepatosplenomegaly, no masses and bowel sounds normal  MS: no gross musculoskeletal defects noted, no edema  SKIN: no suspicious lesions or rashes  NEURO: Normal strength and tone, mentation intact and speech normal  PSYCH: mentation appears normal, affect normal/bright    Diagnostic Test Results:  Labs reviewed in Epic  No results found for this or any previous visit (from the past 24 hour(s)).    ASSESSMENT/PLAN:   Pam was seen today for physical.    Diagnoses and all orders for this visit:    Routine general medical examination at a health care facility    Encounter for allergy testing  -     Adult Allergy/Asthma Referral; Future    Screening for lipid disorders  -     Lipid panel reflex to direct LDL Fasting; Future  -     Lipid panel reflex to direct LDL Fasting    Screening for diabetes mellitus  -     Comprehensive metabolic panel (BMP + Alb, Alk Phos, ALT, AST, Total. Bili, TP); Future  -     Hemoglobin A1c; Future  -     Comprehensive metabolic panel (BMP + Alb, Alk Phos, ALT, AST, Total. Bili, TP)  -     Hemoglobin A1c    Encounter for hepatitis C screening test for low risk patient  -     Hepatitis C antibody; Future  -     Hepatitis C antibody    Screening for HIV (human immunodeficiency virus)  -     HIV Antigen Antibody Combo; Future  -     HIV Antigen Antibody Combo        Patient has been advised of split billing requirements and indicates understanding: Yes  COUNSELING:  Reviewed preventive health counseling, as reflected in patient instructions    Estimated body mass index is 20.22 kg/m  as calculated from the following:    Height as of this encounter: 1.549 m (5' 1\").    Weight as of this encounter: 48.5 kg (107 lb).        She reports that she has never smoked. She has never used smokeless tobacco.      Counseling Resources:  ATP IV Guidelines  Pooled Cohorts Equation Calculator  Breast Cancer Risk " Calculator  BRCA-Related Cancer Risk Assessment: FHS-7 Tool  FRAX Risk Assessment  ICSI Preventive Guidelines  Dietary Guidelines for Americans, 2010  [employer]'s MyPlate  ASA Prophylaxis  Lung CA Screening    Dwayne Caraballo NP  [facility]

## 2021-07-30 LAB
ALBUMIN SERPL-MCNC: 4.2 G/DL (ref 3.4–5)
ALP SERPL-CCNC: 75 U/L (ref 40–150)
ALT SERPL W P-5'-P-CCNC: 27 U/L (ref 0–50)
ANION GAP SERPL CALCULATED.3IONS-SCNC: 8 MMOL/L (ref 3–14)
AST SERPL W P-5'-P-CCNC: 17 U/L (ref 0–45)
BILIRUB SERPL-MCNC: 0.4 MG/DL (ref 0.2–1.3)
BUN SERPL-MCNC: 24 MG/DL (ref 7–30)
CALCIUM SERPL-MCNC: 9.7 MG/DL (ref 8.5–10.1)
CHLORIDE BLD-SCNC: 108 MMOL/L (ref 94–109)
CHOLEST SERPL-MCNC: 203 MG/DL
CO2 SERPL-SCNC: 24 MMOL/L (ref 20–32)
CREAT SERPL-MCNC: 0.64 MG/DL (ref 0.52–1.04)
FASTING STATUS PATIENT QL REPORTED: NO
GFR SERPL CREATININE-BSD FRML MDRD: >90 ML/MIN/1.73M2
GLUCOSE BLD-MCNC: 82 MG/DL (ref 70–99)
HCV AB SERPL QL IA: NONREACTIVE
HDLC SERPL-MCNC: 65 MG/DL
HIV 1+2 AB+HIV1 P24 AG SERPL QL IA: NONREACTIVE
LDLC SERPL CALC-MCNC: 128 MG/DL
NONHDLC SERPL-MCNC: 138 MG/DL
POTASSIUM BLD-SCNC: 3.8 MMOL/L (ref 3.4–5.3)
PROT SERPL-MCNC: 8.1 G/DL (ref 6.8–8.8)
SODIUM SERPL-SCNC: 140 MMOL/L (ref 133–144)
TRIGL SERPL-MCNC: 51 MG/DL

## 2021-08-04 NOTE — TELEPHONE ENCOUNTER
Form completed copy sent to abstraction and then brought up front spouse allowed to  form for wife.   Kasia Lara

## 2021-08-06 NOTE — TELEPHONE ENCOUNTER
Form printed off again. Pt's spouse states the wrong from was used for his wife. Any questions, pt;'s spouse -  Bat - can be reached at  [phone]. Please call when the form is ready to be picked up. Thank you.  Inge Hanks,

## 2021-09-23 DIAGNOSIS — R10.11 RIGHT UPPER QUADRANT ABDOMINAL PAIN: Primary | ICD-10-CM

## 2021-10-01 ENCOUNTER — ANCILLARY PROCEDURE (OUTPATIENT)
Dept: ULTRASOUND IMAGING | Facility: CLINIC | Age: 26
End: 2021-10-01
Attending: TRANSPLANT SURGERY
Payer: COMMERCIAL

## 2021-10-01 DIAGNOSIS — R10.11 RIGHT UPPER QUADRANT ABDOMINAL PAIN: ICD-10-CM

## 2021-10-01 PROCEDURE — 76705 ECHO EXAM OF ABDOMEN: CPT

## 2021-11-17 ENCOUNTER — OFFICE VISIT (OUTPATIENT)
Dept: FAMILY MEDICINE | Facility: CLINIC | Age: 26
End: 2021-11-17
Payer: COMMERCIAL

## 2021-11-17 VITALS
BODY MASS INDEX: 19.83 KG/M2 | OXYGEN SATURATION: 97 % | RESPIRATION RATE: 16 BRPM | HEIGHT: 61 IN | SYSTOLIC BLOOD PRESSURE: 110 MMHG | HEART RATE: 71 BPM | TEMPERATURE: 98.1 F | WEIGHT: 105 LBS | DIASTOLIC BLOOD PRESSURE: 82 MMHG

## 2021-11-17 DIAGNOSIS — Z86.19 HISTORY OF HELICOBACTER PYLORI INFECTION: ICD-10-CM

## 2021-11-17 DIAGNOSIS — Z23 HIGH PRIORITY FOR 2019-NCOV VACCINE: ICD-10-CM

## 2021-11-17 DIAGNOSIS — K21.9 GASTROESOPHAGEAL REFLUX DISEASE, UNSPECIFIED WHETHER ESOPHAGITIS PRESENT: Primary | ICD-10-CM

## 2021-11-17 LAB
ALBUMIN SERPL-MCNC: 3.8 G/DL (ref 3.4–5)
ALP SERPL-CCNC: 61 U/L (ref 40–150)
ALT SERPL W P-5'-P-CCNC: 26 U/L (ref 0–50)
ANION GAP SERPL CALCULATED.3IONS-SCNC: 5 MMOL/L (ref 3–14)
AST SERPL W P-5'-P-CCNC: 12 U/L (ref 0–45)
BILIRUB SERPL-MCNC: 0.5 MG/DL (ref 0.2–1.3)
BUN SERPL-MCNC: 14 MG/DL (ref 7–30)
CALCIUM SERPL-MCNC: 8.8 MG/DL (ref 8.5–10.1)
CHLORIDE BLD-SCNC: 109 MMOL/L (ref 94–109)
CO2 SERPL-SCNC: 25 MMOL/L (ref 20–32)
CREAT SERPL-MCNC: 0.54 MG/DL (ref 0.52–1.04)
ERYTHROCYTE [DISTWIDTH] IN BLOOD BY AUTOMATED COUNT: 11.9 % (ref 10–15)
GFR SERPL CREATININE-BSD FRML MDRD: >90 ML/MIN/1.73M2
GLUCOSE BLD-MCNC: 86 MG/DL (ref 70–99)
HCT VFR BLD AUTO: 38.7 % (ref 35–47)
HGB BLD-MCNC: 13.2 G/DL (ref 11.7–15.7)
MCH RBC QN AUTO: 32.4 PG (ref 26.5–33)
MCHC RBC AUTO-ENTMCNC: 34.1 G/DL (ref 31.5–36.5)
MCV RBC AUTO: 95 FL (ref 78–100)
PLATELET # BLD AUTO: 303 10E3/UL (ref 150–450)
POTASSIUM BLD-SCNC: 3.7 MMOL/L (ref 3.4–5.3)
PROT SERPL-MCNC: 7.4 G/DL (ref 6.8–8.8)
RBC # BLD AUTO: 4.07 10E6/UL (ref 3.8–5.2)
SODIUM SERPL-SCNC: 139 MMOL/L (ref 133–144)
WBC # BLD AUTO: 6 10E3/UL (ref 4–11)

## 2021-11-17 PROCEDURE — 80053 COMPREHEN METABOLIC PANEL: CPT | Performed by: FAMILY MEDICINE

## 2021-11-17 PROCEDURE — 91300 COVID-19,PF,PFIZER (12+ YRS): CPT | Performed by: FAMILY MEDICINE

## 2021-11-17 PROCEDURE — 85027 COMPLETE CBC AUTOMATED: CPT | Performed by: FAMILY MEDICINE

## 2021-11-17 PROCEDURE — 0004A COVID-19,PF,PFIZER (12+ YRS): CPT | Performed by: FAMILY MEDICINE

## 2021-11-17 PROCEDURE — 99214 OFFICE O/P EST MOD 30 MIN: CPT | Mod: 25 | Performed by: FAMILY MEDICINE

## 2021-11-17 PROCEDURE — 36415 COLL VENOUS BLD VENIPUNCTURE: CPT | Performed by: FAMILY MEDICINE

## 2021-11-17 ASSESSMENT — MIFFLIN-ST. JEOR: SCORE: 1153.66

## 2021-11-17 ASSESSMENT — PAIN SCALES - GENERAL: PAINLEVEL: SEVERE PAIN (6)

## 2021-11-17 NOTE — PATIENT INSTRUCTIONS
Patient Education     GERD (Adult)    The esophagus is a tube that carries food from the mouth to the stomach. A valve (the LES, lower esophageal sphincter) at the lower end of the esophagus prevents stomach acid from flowing upward. When this valve doesn't work properly, stomach contents may repeatedly flow back up (reflux) into the esophagus. This is called gastroesophageal reflux disease (GERD). GERD can irritate the esophagus. It can cause problems with pain, swallowing or breathing. In severe cases, GERD can cause recurrent pneumonia (from aspiration or breathing in particles) or other serious problems.  Symptoms of reflux include burning, pressure or sharp pain in the upper abdomen or mid to lower chest. The pain can spread to the neck, back, or shoulder. There may be belching, an acid taste in the back of the throat, chronic cough, or sore throat, or hoarseness. GERD symptoms often occur during the day after a big meal. They can also occur at night when lying down.   Home care  Lifestyle changes can help reduce symptoms. If needed, your healthcare provider may prescribe medicines. Symptoms often improve with treatment, but if treatment is stopped, the symptoms often return after a few months. So most persons with GERD will need to continue treatment or get treatment on and off.  Lifestyle changes    Limit or avoid fatty, fried, and spicy foods, as well as coffee, chocolate, mint, and foods with high acid content such as tomatoes and citrus fruit and juices (orange, grapefruit, lemon).    Don t eat large meals, especially at night. Frequent, smaller meals are best. Don't lie down right after eating. And don t eat anything 3 hours before going to bed.    Don't drink alcohol or smoke. As much as possible, stay away from second hand smoke.    If you are overweight, losing weight will reduce symptoms.     Don't wear tight clothing around your stomach area.    If your symptoms occur during sleep, use a foam wedge  "to elevate your upper body (not just your head.) Or, place 4\" blocks under the head of your bed. Or use 2 bed risers under your bedframe.  Medicines  If needed, medicines can help relieve the symptoms of GERD and prevent damage to the esophagus. Discuss a medicine plan with your healthcare provider. This may include one or more of the following medicines:    Antacids to help neutralize the normal acids in your stomach.    Acid blockers (Histamine or H2 blockers) to decrease acid production.    Acid inhibitors (proton pump inhibitors PPIs) to decrease acid production in a different way than the blockers. They may work better, but can take a little longer to take effect.  Take an antacid 30 to 60 minutes after eating and at bedtime, but not at the same time as an acid blocker.  Try not to take medicines such as ibuprofen and aspirin. If you are taking aspirin for your heart or other medical reasons, talk to your healthcare provider about stopping it.  Follow-up care  Follow up with your healthcare provider or as advised by our staff.  When to seek medical advice  Call your healthcare provider if any of the following occur:    Stomach pain gets worse or moves to the lower right abdomen (appendix area)    Chest pain appears or gets worse, or spreads to the back, neck, shoulder, or arm    An over-the-counter trial of medicine doesn't relieve your symptoms    Weight loss that can't be explained    Trouble or pain swallowing    Frequent vomiting (can t keep down liquids)    Blood in the stool or vomit (red or black in color)    Feeling weak or dizzy    Fever of 100.4 F (38 C) or higher, or as directed by your healthcare provider  Ines last reviewed this educational content on 3/1/2018    3030-3173 [employer]. All rights reserved. This information is not intended as a substitute for professional medical care. Always follow your healthcare professional's instructions.           "

## 2021-11-17 NOTE — PROGRESS NOTES
Assessment & Plan     (K21.9) Gastroesophageal reflux disease, unspecified whether esophagitis present  (primary encounter diagnosis)  Comment:   Plan: Helicobacter pylori Antigen Stool, CBC with         platelets, Comprehensive metabolic panel,         omeprazole (PRILOSEC) 20 MG DR capsule      (Z86.19) History of Helicobacter pylori infection  Comment: treated 8 yrs ago   Plan:         (Z23) High priority for 2019-nCoV vaccine  Comment:   Plan: COVID-19,PF,PFIZER (12+ Yrs PURPLE LABEL)                Discussed possible differential diagnosis for her symptoms. Sounds like GERD, also talked about H pylori and other causes of recurrent heart burn . Check labs  also she is willing to try Prilosec 20 mg to see if helps with sx talked about reflux precautions etc . Suggest follow up in 4-6 week, sooner if problem. Consider GI referral and further evaluation if needed.     Check labs. refill sent.Cares and  treatment discussed follow. up if problem   Patient and her  expressed understanding and agreement with treatment plan. All patient's questions were answered, will let me know if has more later.  Medications: Rx's: Reviewed the potential side effects/complications of medications prescribed.     Malgorzata Mosquera MD  [facility] MORGAN Orozco is a 25 year old who presents for the following health issues     History of Present Illness       She eats 2-3 servings of fruits and vegetables daily.She consumes 0 sweetened beverage(s) daily.She exercises with enough effort to increase her heart rate 10 to 19 minutes per day.  She exercises with enough effort to increase her heart rate 3 or less days per week.   She is taking medications regularly.     Patient is here regarding  testing POSITIVE for H-Pylori and now on treatment . She also has been treated for H pylori 8 yrs ago and she was better but now has sx coming back  She has  recurrent heat burn,indigestion   epigastric discomfort.  no nausea vomiting. no change in bm. No melena, or blood in stool. No diarrhea or constipation. Has lost weight bc may be not eating as much . Denies any fever , chills, night sweats  No  sx otherwise     She is also requesting covid vaccine 3rd booster  today       Review of Systems   Constitutional, HEENT, cardiovascular, pulmonary, GI, , musculoskeletal, neuro, skin, endocrine and psych systems are negative, except as otherwise noted.      Objective    There were no vitals taken for this visit.  There is no height or weight on file to calculate BMI.  Physical Exam   GENERAL: healthy, alert and no distress  EYES: Eyes grossly normal to inspection  HENT: oropharynx clear and oral mucous membranes moist  NECK: no adenopathy, no asymmetry, masses, or scars and thyroid normal to palpation  RESP: lungs clear to auscultation - no rales, rhonchi or wheezes  CV: regular rate and rhythm, normal S1 S2, no S3 or S4,   ABDOMEN: soft, no acute tenderness , but mild epigastric pressure, but no guarding or rigidity no rebound   no hepatosplenomegaly, no masses and bowel sounds normal  MS: no edema  NEURO: Normal strength and tone, mentation intact and speech normal  PSYCH: mentation appears normal, affect normal/bright

## 2021-11-21 PROCEDURE — 87338 HPYLORI STOOL AG IA: CPT | Performed by: FAMILY MEDICINE

## 2021-11-22 ENCOUNTER — NURSE TRIAGE (OUTPATIENT)
Dept: FAMILY MEDICINE | Facility: CLINIC | Age: 26
End: 2021-11-22
Payer: COMMERCIAL

## 2021-11-22 DIAGNOSIS — Z20.7 EXPOSURE TO PARASITIC DISEASE: ICD-10-CM

## 2021-11-22 DIAGNOSIS — Z20.7 EXPOSURE TO PARASITIC DISEASE: Primary | ICD-10-CM

## 2021-11-22 NOTE — TELEPHONE ENCOUNTER
"Call from patient's  who reports that patient saw pinworms and was exposed by kids. States she needs treatment.     Scheduled virtual visit with Dr. Gamez to assess.    Reason for Disposition    Pinworms seen (white thread-like worm about size of a staple, moves)    Additional Information    Negative: [1] Rectal symptoms AND [2] NO pinworms seen AND [3] NO pinworm EXPOSURE    Negative: [1] Pregnant with rectal symptoms AND [2] NO pinworms seen AND [3] NO pinworm EXPOSURE    Negative: Patient sounds very sick or weak to the triager    Negative: [1] Rectal pain or redness AND [2] fever    Negative: Rash of rectal area (e.g., open sore, painful tiny water blisters, unexplained bumps)    Negative: Rectal area looks infected (e.g., draining sore, spreading redness)    Negative: [1] Pinworms NOT Seen AND [2] rectal itching AND [3] EXPOSURE to pinworms    Negative: [1] Treated with pinworm medicine > 7 days ago AND [2] rectal itch or redness have not gone away    Negative: Pinworms persist or recur after second dose of pinworm medicine    Negative: [1] Pregnant with rectal symptoms AND [2] pinworms seen or suspected    Answer Assessment - Initial Assessment Questions  1. PRESENCE OF PINWORMS: Have you seen any pinworms? If yes, \"What do the worms look like? How long are the worms?\" (Pinworms are very thin thread-like worms about the length of a staple.        Pt and  have noticed pinworms  - have been doing scotch tape test at home    Kids had pinworms     2.  SYMPTOMS: \"Do you have any other symptoms?\" (e.g., anal itching, anal redness)        Anal itching     3.  ONSET: \"When did you first notice symptoms or pinworms?\"        n/a    4.  EXPOSURE: \"Have you been exposed to someone who has pinworms?\" (e.g., family member, intimate partner).        Kids     5.  PREGNANCY: \"Is there any chance you are pregnant?\" \"When was your last menstrual period?\"        Not asked    Protocols used: [id number]      "

## 2021-11-22 NOTE — TELEPHONE ENCOUNTER
I can place 'stool sample order for parasite exam'.    But, the sample should be sent out, the results won't be in for 3-4 days.     Do they still want me to place order?

## 2021-11-22 NOTE — TELEPHONE ENCOUNTER
Patient returned call and would like the stool sample test, is aware it would take 3 to 4 days to get results.    Annette Duarte RN

## 2021-11-22 NOTE — TELEPHONE ENCOUNTER
Call back from patient's . He is questioning if Dr. Gamez will order stool test to be completed prior to tomorrow's virtual visit. Routing to provider to review/adivse.

## 2021-11-22 NOTE — TELEPHONE ENCOUNTER
Patient Contact    Attempt # 1    Was call answered?  No.  Left message on voicemail with information to call triage back.    On call back:     - Relay provider message below

## 2021-11-23 ENCOUNTER — MYC MEDICAL ADVICE (OUTPATIENT)
Dept: FAMILY MEDICINE | Facility: CLINIC | Age: 26
End: 2021-11-23

## 2021-11-23 ENCOUNTER — VIRTUAL VISIT (OUTPATIENT)
Dept: FAMILY MEDICINE | Facility: CLINIC | Age: 26
End: 2021-11-23
Payer: COMMERCIAL

## 2021-11-23 DIAGNOSIS — B80 PINWORM INFECTION: Primary | ICD-10-CM

## 2021-11-23 LAB — H PYLORI AG STL QL IA: POSITIVE

## 2021-11-23 PROCEDURE — 99213 OFFICE O/P EST LOW 20 MIN: CPT | Mod: 95 | Performed by: FAMILY MEDICINE

## 2021-11-23 RX ORDER — ALBENDAZOLE 200 MG/1
400 TABLET, FILM COATED ORAL
Qty: 4 TABLET | Refills: 0 | Status: SHIPPED | OUTPATIENT
Start: 2021-11-23 | End: 2022-05-24

## 2021-11-23 NOTE — PROGRESS NOTES
Pam is a 25 year old who is being evaluated via a billable video visit.      How would you like to obtain your AVS? MyChart  If the video visit is dropped, the invitation should be resent by: Text to cell phone: [phone]  Will anyone else be joining your video visit? No      Video Start Time: 2:00    Assessment & Plan     Pinworm infection  Was exposed to pinworm from her children, who goes to , she has anal itching and abd discomfort  Will have her to drop the stool sample, and also will have her to take antihelminthic med   - mebendazole (VERMOX) 100 MG chewable tablet; Please take 1 tablet now, and take the second dose in 2 weeks in empty stomach         FUTURE APPOINTMENTS:       - Follow-up visit as needed     No follow-ups on file.    Juarez Gamez MD  [facility] 25 year [facility] who presents for the following health issues      History of Present Illness       She eats 2-3 servings of fruits and vegetables daily.She consumes 0 sweetened beverage(s) daily.She exercises with enough effort to increase her heart rate 10 to 19 minutes per day.  She exercises with enough effort to increase her heart rate 3 or less days per week.   She is taking medications regularly.       Concern - pinworms   Onset:   Description:   Intensity: mild  Progression of Symptoms:  constant  Accompanying Signs & Symptoms: anal itching and abd discomfort   Previous history of similar problem: none  Precipitating factors:        Worsened by: none  Alleviating factors:        Improved by: fasting   Therapies tried and outcome:  none       Review of Systems   Constitutional, HEENT, cardiovascular, pulmonary, gi and gu systems are negative, except as otherwise noted.      Objective           Vitals:  No vitals were obtained today due to virtual visit.    Physical Exam   GENERAL: Healthy, alert and no distress  EYES: Eyes grossly normal to inspection.  No discharge or  erythema, or obvious scleral/conjunctival abnormalities.  RESP: No audible wheeze, cough, or visible cyanosis.  No visible retractions or increased work of breathing.    SKIN: Visible skin clear. No significant rash, abnormal pigmentation or lesions.  NEURO: Cranial nerves grossly intact.  Mentation and speech appropriate for age.  PSYCH: Mentation appears normal, affect normal/bright, judgement and insight intact, normal speech and appearance well-groomed.                Video-Visit Details    Type of service:  Video Visit    Video End Time:2:28 PM    Originating Location (pt. Location): Home    Distant Location (provider location):  [facility]     Platform used for Video Visit: Flickr

## 2021-11-24 NOTE — TELEPHONE ENCOUNTER
Patient Contact    Called patients  to relay message. Already had virtual visit. Relayed that new rx for more affordable medication was sent. He verbalizes understanding.     At this time, he is not planning on having her complete the stool sample since they have treatment. He questions if she should return in 4 weeks to check and verify she does not have pinworms? Routing to provider to advise.

## 2021-11-26 ENCOUNTER — MYC MEDICAL ADVICE (OUTPATIENT)
Dept: FAMILY MEDICINE | Facility: CLINIC | Age: 26
End: 2021-11-26
Payer: COMMERCIAL

## 2021-11-26 DIAGNOSIS — A04.8 HELICOBACTER PYLORI STOOL TEST POSITIVE: Primary | ICD-10-CM

## 2021-11-26 RX ORDER — LEVOFLOXACIN 500 MG/1
500 TABLET, FILM COATED ORAL DAILY
Qty: 10 TABLET | Refills: 0 | Status: SHIPPED | OUTPATIENT
Start: 2021-11-26 | End: 2022-05-24

## 2021-11-26 RX ORDER — METRONIDAZOLE 500 MG/1
500 TABLET ORAL 2 TIMES DAILY
Qty: 20 TABLET | Refills: 0 | Status: SHIPPED | OUTPATIENT
Start: 2021-11-26 | End: 2021-12-03

## 2021-12-10 DIAGNOSIS — K21.9 GASTROESOPHAGEAL REFLUX DISEASE, UNSPECIFIED WHETHER ESOPHAGITIS PRESENT: ICD-10-CM

## 2021-12-13 NOTE — TELEPHONE ENCOUNTER
Prescription approved per [facility] Refill Protocol.  Taryn BELTRAN RN  [facility]

## 2021-12-24 ENCOUNTER — OFFICE VISIT (OUTPATIENT)
Dept: FAMILY MEDICINE | Facility: CLINIC | Age: 26
End: 2021-12-24
Payer: COMMERCIAL

## 2021-12-24 ENCOUNTER — ANCILLARY PROCEDURE (OUTPATIENT)
Dept: GENERAL RADIOLOGY | Facility: CLINIC | Age: 26
End: 2021-12-24
Attending: FAMILY MEDICINE
Payer: COMMERCIAL

## 2021-12-24 VITALS
DIASTOLIC BLOOD PRESSURE: 64 MMHG | TEMPERATURE: 98.5 F | RESPIRATION RATE: 16 BRPM | BODY MASS INDEX: 19.61 KG/M2 | HEART RATE: 80 BPM | WEIGHT: 103.8 LBS | SYSTOLIC BLOOD PRESSURE: 100 MMHG | OXYGEN SATURATION: 97 %

## 2021-12-24 DIAGNOSIS — R10.84 ABDOMINAL PAIN, GENERALIZED: Primary | ICD-10-CM

## 2021-12-24 DIAGNOSIS — R82.90 ABNORMAL FINDING ON URINALYSIS: ICD-10-CM

## 2021-12-24 DIAGNOSIS — K59.09 CHRONIC CONSTIPATION: ICD-10-CM

## 2021-12-24 DIAGNOSIS — Z86.19 HISTORY OF HELICOBACTER INFECTION: ICD-10-CM

## 2021-12-24 DIAGNOSIS — N39.0 URINARY TRACT INFECTION WITHOUT HEMATURIA, SITE UNSPECIFIED: ICD-10-CM

## 2021-12-24 DIAGNOSIS — R10.12 LUQ ABDOMINAL PAIN: ICD-10-CM

## 2021-12-24 LAB
ALBUMIN SERPL-MCNC: 4.1 G/DL (ref 3.4–5)
ALBUMIN UR-MCNC: NEGATIVE MG/DL
ALP SERPL-CCNC: 65 U/L (ref 40–150)
ALT SERPL W P-5'-P-CCNC: 27 U/L (ref 0–50)
ANION GAP SERPL CALCULATED.3IONS-SCNC: 6 MMOL/L (ref 3–14)
APPEARANCE UR: CLEAR
AST SERPL W P-5'-P-CCNC: 20 U/L (ref 0–45)
BACTERIA #/AREA URNS HPF: ABNORMAL /HPF
BASOPHILS # BLD AUTO: 0 10E3/UL (ref 0–0.2)
BASOPHILS NFR BLD AUTO: 0 %
BILIRUB SERPL-MCNC: 0.9 MG/DL (ref 0.2–1.3)
BILIRUB UR QL STRIP: NEGATIVE
BUN SERPL-MCNC: 16 MG/DL (ref 7–30)
CALCIUM SERPL-MCNC: 9 MG/DL (ref 8.5–10.1)
CHLORIDE BLD-SCNC: 109 MMOL/L (ref 94–109)
CO2 SERPL-SCNC: 24 MMOL/L (ref 20–32)
COLOR UR AUTO: YELLOW
CREAT SERPL-MCNC: 0.58 MG/DL (ref 0.52–1.04)
EOSINOPHIL # BLD AUTO: 0.1 10E3/UL (ref 0–0.7)
EOSINOPHIL NFR BLD AUTO: 2 %
ERYTHROCYTE [DISTWIDTH] IN BLOOD BY AUTOMATED COUNT: 12.2 % (ref 10–15)
GFR SERPL CREATININE-BSD FRML MDRD: >90 ML/MIN/1.73M2
GLUCOSE BLD-MCNC: 78 MG/DL (ref 70–99)
GLUCOSE UR STRIP-MCNC: NEGATIVE MG/DL
HCT VFR BLD AUTO: 41.5 % (ref 35–47)
HGB BLD-MCNC: 13.8 G/DL (ref 11.7–15.7)
HGB UR QL STRIP: ABNORMAL
KETONES UR STRIP-MCNC: NEGATIVE MG/DL
LEUKOCYTE ESTERASE UR QL STRIP: NEGATIVE
LIPASE SERPL-CCNC: 104 U/L (ref 73–393)
LYMPHOCYTES # BLD AUTO: 2.8 10E3/UL (ref 0.8–5.3)
LYMPHOCYTES NFR BLD AUTO: 42 %
MCH RBC QN AUTO: 32 PG (ref 26.5–33)
MCHC RBC AUTO-ENTMCNC: 33.3 G/DL (ref 31.5–36.5)
MCV RBC AUTO: 96 FL (ref 78–100)
MONOCYTES # BLD AUTO: 0.3 10E3/UL (ref 0–1.3)
MONOCYTES NFR BLD AUTO: 5 %
MUCOUS THREADS #/AREA URNS LPF: PRESENT /LPF
NEUTROPHILS # BLD AUTO: 3.4 10E3/UL (ref 1.6–8.3)
NEUTROPHILS NFR BLD AUTO: 51 %
NITRATE UR QL: NEGATIVE
PH UR STRIP: 6 [PH] (ref 5–7)
PLATELET # BLD AUTO: 373 10E3/UL (ref 150–450)
POTASSIUM BLD-SCNC: 3.9 MMOL/L (ref 3.4–5.3)
PROT SERPL-MCNC: 8.2 G/DL (ref 6.8–8.8)
RBC # BLD AUTO: 4.31 10E6/UL (ref 3.8–5.2)
RBC #/AREA URNS AUTO: ABNORMAL /HPF
SODIUM SERPL-SCNC: 139 MMOL/L (ref 133–144)
SP GR UR STRIP: 1.02 (ref 1–1.03)
SQUAMOUS #/AREA URNS AUTO: ABNORMAL /LPF
UROBILINOGEN UR STRIP-ACNC: 0.2 E.U./DL
WBC # BLD AUTO: 6.7 10E3/UL (ref 4–11)
WBC #/AREA URNS AUTO: ABNORMAL /HPF

## 2021-12-24 PROCEDURE — 85025 COMPLETE CBC W/AUTO DIFF WBC: CPT | Performed by: FAMILY MEDICINE

## 2021-12-24 PROCEDURE — 99214 OFFICE O/P EST MOD 30 MIN: CPT | Performed by: FAMILY MEDICINE

## 2021-12-24 PROCEDURE — 81001 URINALYSIS AUTO W/SCOPE: CPT | Performed by: FAMILY MEDICINE

## 2021-12-24 PROCEDURE — 83690 ASSAY OF LIPASE: CPT | Performed by: FAMILY MEDICINE

## 2021-12-24 PROCEDURE — 80053 COMPREHEN METABOLIC PANEL: CPT | Performed by: FAMILY MEDICINE

## 2021-12-24 PROCEDURE — 36415 COLL VENOUS BLD VENIPUNCTURE: CPT | Performed by: FAMILY MEDICINE

## 2021-12-24 PROCEDURE — 87086 URINE CULTURE/COLONY COUNT: CPT | Performed by: FAMILY MEDICINE

## 2021-12-24 PROCEDURE — 74019 RADEX ABDOMEN 2 VIEWS: CPT | Mod: FY | Performed by: RADIOLOGY

## 2021-12-24 RX ORDER — SULFAMETHOXAZOLE/TRIMETHOPRIM 800-160 MG
1 TABLET ORAL 2 TIMES DAILY
Qty: 14 TABLET | Refills: 0 | Status: SHIPPED | OUTPATIENT
Start: 2021-12-24 | End: 2022-05-24

## 2021-12-24 NOTE — RESULT ENCOUNTER NOTE
Sasha Orozco,  Your lab test showed normal hemoglobin and blood counts, liver and kidney functions and negative enzymes.  This is reassuring.  Your urine exam showed trace infection.  I had sent a prescription for antibiotic-Bactrim to take twice a day for the next 7 days.  If your abdominal pain is not any better in 7 to 10 days, please follow-up for further evaluation.  Make sure to drink plenty of water and treat underlying constipation as we discussed today   Let me know if you have any questions. Take care.  Sugey Jean MD

## 2021-12-24 NOTE — PATIENT INSTRUCTIONS
Patient Education     Constipation (Adult)  Constipation means that you have bowel movements that are less frequent than usual. Stools often become very hard and difficult to pass.  Constipation is very common. At some point in life, it affects almost everyone. Since everyone's bowel habits are different, what is constipation to one person may not be to another. Your healthcare provider may do tests to diagnose constipation. It depends on what he or she finds when evaluating you.    Symptoms of constipation include:    Abdominal pain    Bloating    Vomiting    Painful bowel movements    Itching, swelling, bleeding, or pain around the anus  Causes  Constipation can have many causes. These include:    Diet low in fiber    Too much dairy    Not drinking enough liquids    Lack of exercise or physical activity (especially true for older adults)    Changes in lifestyle or daily routine, including pregnancy, aging, work, and travel    Frequent use or misuse of laxatives    Ignoring the urge to have a bowel movement or delaying it until later    Medicines, such as certain prescription pain medicines, iron supplements, antacids, certain antidepressants, and calcium supplements    Diseases like irritable bowel syndrome, bowel obstructions, stroke, diabetes, thyroid disease, Parkinson disease, hemorrhoids, and colon cancer  Complications  Potential complications of constipation can include:    Hemorrhoids    Rectal bleeding from hemorrhoids or anal fissures (skin tears)    Hernias    Dependency on laxatives    Chronic constipation    Fecal impaction, a severe form of constipation in which a large amount of hard stool is in your rectum that you can't pass    Bowel obstruction or perforation  Home care  All treatment should be done after talking with your healthcare provider. This is especially true if you have another medical problems, are taking prescription medicines, or are an older adult. Treatment most often involves  lifestyle changes. You may also need medicines. Your healthcare provider will tell you which will work best for you. Follow the advice below to help avoid this problem in the future.  Lifestyle changes  These lifestyle changes can help prevent constipation:    Diet. Eat a high-fiber diet, with fresh fruit and vegetables, and reduce dairy intake, meats, and processed foods    Fluids. It's important to get enough fluids each day. Drink plenty of water when you eat more fiber. If you are on diet that limits the amount of fluid you can have, talk about this with your healthcare provider.    Regular exercise. Check with your healthcare provider first.  Medicines  Take any medicines as directed. Some laxatives are safe to use only every now and then. Others can be taken on a regular basis. While laxatives don't cause bowel dependence, they are treating the symptoms. So your constipation may return if you don't make other changes. Talk with your healthcare provider or pharmacist if you have questions.  Prescription pain medicines can cause constipation. If you are taking this kind of medicine, ask your healthcare provider if you should also take a stool softener.  Medicines you may take to treat constipation include:    Fiber supplements    Stool softeners    Laxatives    Enemas    Rectal suppositories  Follow-up care  Follow up with your healthcare provider if symptoms don't get better in the next few days. You may need to have more tests or see a specialist.  Call 911  Call 911 if any of these occur:    Trouble breathing    Stiff, rigid abdomen that is severely painful to touch    Confusion    Fainting or loss of consciousness    Rapid heart rate    Chest pain  When to seek medical advice  Call your healthcare provider right away if any of these occur:    Fever of 100.4 F (38 C) or higher, or as directed by your healthcare provider    Failure to resume normal bowel movements    Pain in your abdomen or back gets  worse    Nausea or vomiting    Swelling in your abdomen    Blood in the stool    Black, tarry stool    Involuntary weight loss    Weakness  [employer] last reviewed this educational content on 6/1/2018 2000-2021 [employer]. All rights reserved. This information is not intended as a substitute for professional medical care. Always follow your healthcare professional's instructions.           Patient Education     Treating Constipation  Constipation is a common and often uncomfortable problem. Constipation means you have bowel movements fewer than 3 times per week. Or that you strain to pass hard, dry stool. It can last a short time. Or it can be a problem that never seems to go away. The good news is that it can often be treated and controlled.   Eat more fiber  One of the best ways to help treat constipation is to increase your fiber intake. You can do this either through diet or by using fiber supplements. Fiber (in whole grains, fruits, and vegetables) adds bulk and absorbs water to soften the stool. This helps the stool pass through the colon more easily. When you increase your fiber intake, do it slowly to prevent side effects such as bloating. Also increase the amount of water that you drink. Eating more of these foods can add fiber to your diet:     High-fiber cereals    Whole grains, bran, and brown rice    Vegetables such as carrots, broccoli, and greens    Fresh fruits (especially apples, pears, and dried fruits such as raisins and apricots)    Nuts and legumes (especially beans such as lentils, kidney beans, and lima beans)  Get physically active  Exercise helps improve the working of your colon which helps ease constipation. Try to get some physical activity every day. If you haven t been active for a while, talk with your healthcare provider before starting again.     Laxatives  Your healthcare provider may suggest an over-the-counter product to help ease your constipation. He or she may  suggest the use of bulk-forming agents or laxatives. Laxatives, if used as directed, are common and safe. Follow directions carefully when using them. See your provider for new-onset constipation, or long-term constipation, to rule out other causes such as medicines or thyroid disease.   [employer] last reviewed this educational content on 6/1/2019 2000-2021 [employer]. All rights reserved. This information is not intended as a substitute for professional medical care. Always follow your healthcare professional's instructions.           Patient Education     Eating a High-Fiber Diet  Fiber is what gives strength and structure to plants. Most grains, beans, vegetables, and fruits contain fiber. Foods rich in fiber are often low in calories and fat, and they fill you up more. They may also reduce your risks for certain health problems. To find out the amount of fiber in canned, packaged, or frozen foods, read the Nutrition Facts label. It tells you how much fiber is in one serving.    Types of fiber and their benefits  There are two types of fiber: insoluble and soluble. They both aid digestion and help you maintain a healthy weight.    Insoluble fiber. This is found in whole grains, cereals, certain fruits and vegetables such as apple skin, corn, and carrots. Insoluble fiber may prevent constipation and reduce the risk for certain types of cancer. It's called insoluble because it doesn't dissolve in water.    Soluble fiber. This type of fiber is in oats, beans, and certain fruits and vegetables such as strawberries and peas. Soluble fiber can reduce cholesterol, which may help lower the risk for heart disease. It also helps control blood sugar levels.  Look for high-fiber foods  Try these foods to add fiber to your diet:    Whole-grain breads and cereals. Try to eat 6 to 8 ounces a day. Include wheat and oat bran cereals, whole-wheat muffins or toast, and corn tortillas in your meals.    Fruits. Try  to eat 2 cups a day. Apples, oranges, strawberries, pears, and bananas are good sources. (Note: Fruit juice is low in fiber.)    Vegetables. Try to eat at least 2.5 cups a day. Add asparagus, carrots, broccoli, peas, and corn to your meals.    Beans. One cup of cooked lentils gives you over 15 grams of fiber. Try navy beans, lentils, and chickpeas.    Seeds. A small handful of seeds gives you about 3 grams of fiber. Try sunflower or harrison seeds.  Keep track of your fiber  Keep track of how much fiber you eat. Start by reading food labels. Then eat a variety of foods high in fiber. As you start to eat more fiber, ask your healthcare provider how much water you should be drinking to keep your digestive system working smoothly.  Aim for a certain amount of fiber in your diet each day. The daily value for fiber is 25 grams a day. But some experts advise that women under 50 eat 25 to 28 grams per day and that men under 50 eat 30 to 38 grams per day. After age 50, your daily fiber needs drop to 22 grams for women and 28 grams for men.  Before you reach for the fiber supplements, think about this. Fiber is found naturally in healthy whole foods. It gives you that feeling of fullness after you eat. Taking fiber supplements or eating fiber-enriched foods will not give you this full feeling.  Your fiber intake is a good measure for the quality of your overall diet. If you are missing out on your daily amount of fiber, you may be lacking other important nutrients as well.  [facility] last reviewed this educational content on 7/1/2019 2000-2021 [employer]. All rights reserved. This information is not intended as a substitute for professional medical care. Always follow your healthcare professional's instructions.

## 2021-12-24 NOTE — PROGRESS NOTES
bactrim  Assessment & Plan     Abdominal pain, generalized  ddx-constipation/other acute abdominal pathology including pancreatitis/UTI  Given the history, recommended to get flat abdominal x-rays  X-rays were reviewed with patient and her  moderate-to large amount of stool in the colon which could likely explain her symptoms  Encouraged to have a bowel program consisting of daily miralax until she gets regular soft bowel movement everyday and then decreasing the frequency of miralax to 2-3 times weekly for maintenance. Reviewed fiber rich diet, fiber snacks, pushing fluids and regular exercises. RTCif no betetr in 2weeks or sooner prn.  Patient verbalised understanding and is agreeable to the plan.    - XR Abdomen 2 Views; Future  - UA with Microscopic reflex to Culture - lab collect; Future  - CBC with platelets and differential; Future  - Comprehensive metabolic panel (BMP + Alb, Alk Phos, ALT, AST, Total. Bili, TP); Future  - Lipase; Future  - Helicobacter pylori Antigen Stool; Future  - Helicobacter pylori Antigen Stool  - Lipase  - Comprehensive metabolic panel (BMP + Alb, Alk Phos, ALT, AST, Total. Bili, TP)  - CBC with platelets and differential  - UA with Microscopic reflex to Culture - lab collect    History of Helicobacter infection  Patient was treated for positive H. pylori infection on 11/21/2021  Recommended to repeat the expiratory testing 4 weeks after the completion of treatment to confirm resolution  - Helicobacter pylori Antigen Stool; Future  - Helicobacter pylori Antigen Stool    Chronic constipation  as above    - XR Abdomen 2 Views; Future    Urinary tract infection  -Reviewed urine exam showing trace amount of blood with microscopic hematuria and bacteriuria  Will treat with 1 week worth of Bactrim  MyChart result note sent  RTC in 1week if no better by then or sooner prn.        Review of the result(s) of each unique test - Abdominal ultrasound from 2 months ago, flat abdominal  x-rays, positive stool H. pylori, CBC, CMP, lipase and urine exam from today         Chart documentation done in part with Dragon Voice recognition Software. Although reviewed after completion, some word and grammatical error may remain.    See Patient Instructions    Return in about 2 weeks (around 2022), or if symptoms worsen or fail to improve.    Sugey Jean MD  [facility] [place]    Subjective   Jargalzaya is a 26 year old who presents for the following health issues  accompanied by her spouse and 1-year-old child.    Patient is new to the provider, is here today with her 1-year-old child and  with concerns of having pain in the left upper quadrant of abdomen that started yesterday sharp in nature, 6-7 out of 10, slightly radiating to the left flank and left mid back  Denies associated symptoms for fever, chills, nausea, vomiting, diarrhea, dysuria, hematuria, urinary urgency, frequency, abnormal vaginal discharge, bleeding.  Denies recurrent UTI, history of nephrolithiasis.  Has had right upper quadrant pain 2 months ago had an ultrasound that showed normal liver and gallbladder,.  Was tested positive for stool H. pylori and was treated  Denies current concerns for heartburn, denies NSAID use  Has no history of gastritis, gastric ulcers in the past  Patient does not smoke, does not use alcohol currently  Denies abnormal skin rashes  Has had chronic history of constipation, gets BM every 2 to 3 days  Last bowel movement was 2 days ago  Patient is  1 para 1, last childbirth-1 year ago, currently breast-feeding  Is not using any contraception, LMP-2021  Patient denies abdominal bloating    History of Present Illness       She eats 2-3 servings of fruits and vegetables daily.She consumes 0 sweetened beverage(s) daily.She exercises with enough effort to increase her heart rate 10 to 19 minutes per day.  She exercises with enough effort to increase her heart rate 3 or  less days per week.   She is taking medications regularly.       Concern - left  upper stomach pain, and whole abdomen  Onset: Since yesterday  Description: Sharp  Intensity: 7/10  Progression of Symptoms:  same  Accompanying Signs & Symptoms: None  Previous history of similar problem: Yes  Precipitating factors:        Worsened by: None  Alleviating factors:        Improved by: None  Therapies tried and outcome: None        Review of Systems   CONSTITUTIONAL: NEGATIVE for fever, chills, change in weight  INTEGUMENTARY/SKIN: NEGATIVE for worrisome rashes, moles or lesions  RESP: NEGATIVE for significant cough or SOB  CV: NEGATIVE for chest pain, palpitations or peripheral edema  GI: as above  : normal menstrual cycles  MUSCULOSKELETAL: NEGATIVE for significant arthralgias or myalgia  PSYCHIATRIC: NEGATIVE for changes in mood or affect      Objective    /64 (BP Location: Right arm, Patient Position: Sitting, Cuff Size: Adult Regular)   Pulse 80   Temp 98.5  F (36.9  C) (Oral)   Resp 16   Wt 47.1 kg (103 lb 12.8 oz)   SpO2 97%   Breastfeeding Yes   BMI 19.61 kg/m    Body mass index is 19.61 kg/m .  Physical Exam   GENERAL: healthy, alert and no distress  HENT: oropharynx clear and oral mucous membranes moist  RESP: lungs clear to auscultation - no rales, rhonchi or wheezes  CV: regular rate and rhythm, normal S1 S2, no S3 or S4, no murmur, click or rub, no peripheral edema and peripheral pulses strong  ABDOMEN: soft, minimal to moderate diffuse generalized tenderness with no guarding or rigidity, no organomegaly, no costovertebral angle tenderness    MS: no gross musculoskeletal defects noted, no edema  SKIN: no suspicious lesions or rashes  BACK: no CVA tenderness, no paralumbar tenderness  PSYCH: mentation appears normal, affect normal/bright    Results for orders placed or performed in visit on 12/24/21   XR Abdomen 2 Views     Status: None    Narrative    ABDOMEN TWO VIEWS  12/24/2021 11:17 AM      HISTORY: Left upper quadrant abdominal pain.    COMPARISON: None.      Impression    IMPRESSION: Moderate volume retained colonic stool. No small bowel  obstruction or free air. No urinary tract calculi demonstrated.    FANG GOINS MD         SYSTEM ID:  [id number]   Results for orders placed or performed in visit on 12/24/21   Lipase     Status: Normal   Result Value Ref Range    Lipase 104 73 - 393 U/L   Comprehensive metabolic panel (BMP + Alb, Alk Phos, ALT, AST, Total. Bili, TP)     Status: Normal   Result Value Ref Range    Sodium 139 133 - 144 mmol/L    Potassium 3.9 3.4 - 5.3 mmol/L    Chloride 109 94 - 109 mmol/L    Carbon Dioxide (CO2) 24 20 - 32 mmol/L    Anion Gap 6 3 - 14 mmol/L    Urea Nitrogen 16 7 - 30 mg/dL    Creatinine 0.58 0.52 - 1.04 mg/dL    Calcium 9.0 8.5 - 10.1 mg/dL    Glucose 78 70 - 99 mg/dL    Alkaline Phosphatase 65 40 - 150 U/L    AST 20 0 - 45 U/L    ALT 27 0 - 50 U/L    Protein Total 8.2 6.8 - 8.8 g/dL    Albumin 4.1 3.4 - 5.0 g/dL    Bilirubin Total 0.9 0.2 - 1.3 mg/dL    GFR Estimate >90 >60 mL/min/1.73m2   UA with Microscopic reflex to Culture - lab collect     Status: Abnormal    Specimen: Urine, Clean Catch   Result Value Ref Range    Color Urine Yellow Colorless, Straw, Light Yellow, Yellow    Appearance Urine Clear Clear    Glucose Urine Negative Negative mg/dL    Bilirubin Urine Negative Negative    Ketones Urine Negative Negative mg/dL    Specific Gravity Urine 1.025 1.003 - 1.035    Blood Urine Trace (A) Negative    pH Urine 6.0 5.0 - 7.0    Protein Albumin Urine Negative Negative mg/dL    Urobilinogen Urine 0.2 0.2, 1.0 E.U./dL    Nitrite Urine Negative Negative    Leukocyte Esterase Urine Negative Negative   CBC with platelets and differential     Status: None   Result Value Ref Range    WBC Count 6.7 4.0 - 11.0 10e3/uL    RBC Count 4.31 3.80 - 5.20 10e6/uL    Hemoglobin 13.8 11.7 - 15.7 g/dL    Hematocrit 41.5 35.0 - 47.0 %    MCV 96 78 - 100 fL    MCH 32.0 26.5 -  33.0 pg    MCHC 33.3 31.5 - 36.5 g/dL    RDW 12.2 10.0 - 15.0 %    Platelet Count 373 150 - 450 10e3/uL    % Neutrophils 51 %    % Lymphocytes 42 %    % Monocytes 5 %    % Eosinophils 2 %    % Basophils 0 %    Absolute Neutrophils 3.4 1.6 - 8.3 10e3/uL    Absolute Lymphocytes 2.8 0.8 - 5.3 10e3/uL    Absolute Monocytes 0.3 0.0 - 1.3 10e3/uL    Absolute Eosinophils 0.1 0.0 - 0.7 10e3/uL    Absolute Basophils 0.0 0.0 - 0.2 10e3/uL   Urine Microscopic     Status: Abnormal   Result Value Ref Range    Bacteria Urine Few (A) None Seen /HPF    RBC Urine 2-5 (A) 0-2 /HPF /HPF    WBC Urine 0-5 0-5 /HPF /HPF    Squamous Epithelials Urine Few (A) None Seen /LPF    Mucus Urine Present (A) None Seen /LPF    Narrative    Urine Culture not indicated   CBC with platelets and differential     Status: None    Narrative    The following orders were created for panel order CBC with platelets and differential.  Procedure                               Abnormality         Status                     ---------                               -----------         ------                     CBC with platelets and d...[[id number]]                      Final result                 Please view results for these tests on the individual orders.

## 2021-12-25 LAB — BACTERIA UR CULT: NO GROWTH

## 2022-01-10 ENCOUNTER — OFFICE VISIT (OUTPATIENT)
Dept: FAMILY MEDICINE | Facility: CLINIC | Age: 27
End: 2022-01-10
Payer: COMMERCIAL

## 2022-01-10 VITALS
SYSTOLIC BLOOD PRESSURE: 103 MMHG | HEART RATE: 100 BPM | HEIGHT: 61 IN | BODY MASS INDEX: 19.61 KG/M2 | OXYGEN SATURATION: 97 % | RESPIRATION RATE: 20 BRPM | DIASTOLIC BLOOD PRESSURE: 66 MMHG | TEMPERATURE: 98.5 F

## 2022-01-10 DIAGNOSIS — R07.0 THROAT PAIN: Primary | ICD-10-CM

## 2022-01-10 LAB
DEPRECATED S PYO AG THROAT QL EIA: NEGATIVE
GROUP A STREP BY PCR: NOT DETECTED
SARS-COV-2 RNA RESP QL NAA+PROBE: NEGATIVE

## 2022-01-10 PROCEDURE — 87651 STREP A DNA AMP PROBE: CPT | Performed by: INTERNAL MEDICINE

## 2022-01-10 PROCEDURE — 99213 OFFICE O/P EST LOW 20 MIN: CPT | Performed by: INTERNAL MEDICINE

## 2022-01-10 PROCEDURE — U0005 INFEC AGEN DETEC AMPLI PROBE: HCPCS | Performed by: INTERNAL MEDICINE

## 2022-01-10 PROCEDURE — U0003 INFECTIOUS AGENT DETECTION BY NUCLEIC ACID (DNA OR RNA); SEVERE ACUTE RESPIRATORY SYNDROME CORONAVIRUS 2 (SARS-COV-2) (CORONAVIRUS DISEASE [COVID-19]), AMPLIFIED PROBE TECHNIQUE, MAKING USE OF HIGH THROUGHPUT TECHNOLOGIES AS DESCRIBED BY CMS-2020-01-R: HCPCS | Performed by: INTERNAL MEDICINE

## 2022-01-10 ASSESSMENT — PAIN SCALES - GENERAL: PAINLEVEL: EXTREME PAIN (8)

## 2022-01-10 NOTE — PROGRESS NOTES
"  Assessment & Plan     Throat pain  Rapid strep is negative.  Await PCR and COVID test.  Discussed hydration, NSAIDs.    - Symptomatic; Yes; 1/8/2022 COVID-19 Virus (Coronavirus) by PCR Nose  - Streptococcus A Rapid Screen w/Reflex to PCR - Clinic Collect  - Group A Streptococcus PCR Throat Swab          Return in about 1 week (around 1/17/2022) for If no improvement.    Mamta Robbins MD  [facility]    Boris Orozco is a 26 year old who presents for the following health issues  accompanied by her spouse and daughter.    HPI     Acute Illness  Acute illness concerns: potential strep  Onset/Duration: saturday  Symptoms:  Fever: no  Chills/Sweats: YES  Headache (location?): YES  Sinus Pressure: no  Conjunctivitis:  no  Ear Pain: no  Rhinorrhea: no  Congestion: no  Sore Throat: YES  Cough: no  Wheeze: no  Decreased Appetite: no  Nausea: no  Vomiting: no  Diarrhea: no  Dysuria/Freq.: no  Dysuria or Hematuria: no  Fatigue/Achiness: YES  Sick/Strep Exposure: no  Therapies tried and outcome: 500 mg tylenol last night    Pam developed sore throat, fatigue, headache, and chills 2 days ago.  She has not had any cough or fever.  Tylenol helped the sore throat for couple hours.  She has a decreased appetite.  Her son who was in first grade had a sore throat a week and a half ago, tested negative for strep and COVID.  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 Has had covid vaccine and booster.       Review of Systems   Constitutional, HEENT, cardiovascular, pulmonary, gi and gu systems are negative, except as otherwise noted.      Objective    /66 (BP Location: Left arm, Cuff Size: Adult Small)   Pulse 100   Temp 98.5  F (36.9  C) (Tympanic)   Resp 20   Ht 1.549 m (5' 1\")   SpO2 97%   BMI 19.61 kg/m    Body mass index is 19.61 kg/m .  Physical Exam   Gen: tired appearing, pleasant woman, no distress  HEENT: PERRL, no conjunctival injection, no posterior " pharynx erythema, MMM.  TM normal b/l.     Neck: supple, + cervical LAD  Pulm: breathing comfortably, CTAB, no wheezes or rales  CV: RRR, normal S1 and S2, no murmurs  Ext: 2+ radial pulses       Results for orders placed or performed in visit on 01/10/22   Streptococcus A Rapid Screen w/Reflex to PCR - Clinic Collect     Status: Normal    Specimen: Throat; Swab   Result Value Ref Range    Group A Strep antigen Negative Negative

## 2022-02-08 PROCEDURE — 87338 HPYLORI STOOL AG IA: CPT | Performed by: FAMILY MEDICINE

## 2022-02-10 DIAGNOSIS — A04.8 HELICOBACTER PYLORI INFECTION: Primary | ICD-10-CM

## 2022-02-10 DIAGNOSIS — K21.9 GASTROESOPHAGEAL REFLUX DISEASE, UNSPECIFIED WHETHER ESOPHAGITIS PRESENT: ICD-10-CM

## 2022-02-10 RX ORDER — METRONIDAZOLE 500 MG/1
500 TABLET ORAL 3 TIMES DAILY
Qty: 42 TABLET | Refills: 0 | Status: SHIPPED | OUTPATIENT
Start: 2022-02-10 | End: 2022-02-15

## 2022-02-10 RX ORDER — CLARITHROMYCIN 500 MG
500 TABLET ORAL 2 TIMES DAILY
Qty: 28 TABLET | Refills: 0 | Status: SHIPPED | OUTPATIENT
Start: 2022-02-10 | End: 2022-02-15

## 2022-02-10 NOTE — RESULT ENCOUNTER NOTE
Julio Orozco,  Your stool H. pylori test continues to be positive suggestive of persistent infection which might not have responded to the previous treatment.  I have sent a prescription 2 weeks of treatment for recurrent infection to your pharmacy.  Please have the stool test repeated in 2 months after completion of the antibiotic treatment to confirm resolution.   Let me know if you have any questions. Take care.  Sugey Jean MD

## 2022-02-14 ENCOUNTER — TELEPHONE (OUTPATIENT)
Dept: FAMILY MEDICINE | Facility: CLINIC | Age: 27
End: 2022-02-14
Payer: COMMERCIAL

## 2022-02-14 NOTE — TELEPHONE ENCOUNTER
Prior Authorization Retail Medication Request    Medication/Dose: omeprazole (PRILOSEC) 20 MG DR capsule  ICD code (if different than what is on RX):    Previously Tried and Failed:    Rationale:      Insurance Name:    Insurance ID:  [id number]      Pharmacy Information (if different than what is on RX)  Name:  same  Phone:  [phone]

## 2022-02-19 NOTE — TELEPHONE ENCOUNTER
PA Initiation    Medication: omeprazole (PRILOSEC) 20 MG DR capsule  Insurance Company: [employer] - Phone [phone] Fax [phone]  Pharmacy Filling the Rx: [id number] [place] [address] [address]  Filling Pharmacy Phone: [phone]  Filling Pharmacy Fax: [phone]  Start Date: 2/19/2022

## 2022-02-21 NOTE — TELEPHONE ENCOUNTER
Prior Authorization Approval    Authorization Effective Date: 2/10/2022  Authorization Expiration Date: 3/10/2022  Medication: omeprazole (PRILOSEC) 20 MG DR capsule  Approved Dose/Quantity: 2 caps per day  Reference #: [id number]   Insurance Company: [employer] - Phone [phone] Fax [phone]  Which Pharmacy is filling the prescription (Not needed for infusion/clinic administered): [facility] [address] [place] [address] [address]  Pharmacy Notified: Yes  Patient Notified: Yes

## 2022-04-19 ENCOUNTER — LAB (OUTPATIENT)
Dept: LAB | Facility: CLINIC | Age: 27
End: 2022-04-19
Payer: COMMERCIAL

## 2022-04-19 DIAGNOSIS — K21.9 GASTROESOPHAGEAL REFLUX DISEASE, UNSPECIFIED WHETHER ESOPHAGITIS PRESENT: ICD-10-CM

## 2022-04-19 DIAGNOSIS — A04.8 HELICOBACTER PYLORI INFECTION: ICD-10-CM

## 2022-05-18 ENCOUNTER — OFFICE VISIT (OUTPATIENT)
Dept: FAMILY MEDICINE | Facility: CLINIC | Age: 27
End: 2022-05-18
Payer: COMMERCIAL

## 2022-05-18 VITALS
RESPIRATION RATE: 18 BRPM | DIASTOLIC BLOOD PRESSURE: 76 MMHG | SYSTOLIC BLOOD PRESSURE: 118 MMHG | OXYGEN SATURATION: 99 % | BODY MASS INDEX: 20.12 KG/M2 | WEIGHT: 106.6 LBS | TEMPERATURE: 99.1 F | HEART RATE: 84 BPM | HEIGHT: 61 IN

## 2022-05-18 DIAGNOSIS — Z13.220 SCREENING FOR HYPERLIPIDEMIA: ICD-10-CM

## 2022-05-18 DIAGNOSIS — S39.012A STRAIN OF LUMBAR REGION, INITIAL ENCOUNTER: ICD-10-CM

## 2022-05-18 DIAGNOSIS — B37.31 YEAST VAGINITIS: Primary | ICD-10-CM

## 2022-05-18 DIAGNOSIS — Z13.1 SCREENING FOR DIABETES MELLITUS: ICD-10-CM

## 2022-05-18 DIAGNOSIS — Z12.4 CERVICAL CANCER SCREENING: ICD-10-CM

## 2022-05-18 DIAGNOSIS — N89.8 VAGINAL DISCHARGE: ICD-10-CM

## 2022-05-18 LAB
CLUE CELLS: ABNORMAL
TRICHOMONAS, WET PREP: ABNORMAL
WBC'S/HIGH POWER FIELD, WET PREP: ABNORMAL
YEAST, WET PREP: ABNORMAL

## 2022-05-18 PROCEDURE — G0145 SCR C/V CYTO,THINLAYER,RESCR: HCPCS | Performed by: PHYSICIAN ASSISTANT

## 2022-05-18 PROCEDURE — 87210 SMEAR WET MOUNT SALINE/INK: CPT | Performed by: PHYSICIAN ASSISTANT

## 2022-05-18 PROCEDURE — 99214 OFFICE O/P EST MOD 30 MIN: CPT | Performed by: PHYSICIAN ASSISTANT

## 2022-05-18 PROCEDURE — G0124 SCREEN C/V THIN LAYER BY MD: HCPCS | Performed by: PATHOLOGY

## 2022-05-18 RX ORDER — CYCLOBENZAPRINE HCL 10 MG
10 TABLET ORAL 3 TIMES DAILY PRN
Qty: 90 TABLET | Refills: 0 | Status: ON HOLD | OUTPATIENT
Start: 2022-05-18 | End: 2022-09-15

## 2022-05-18 RX ORDER — FLUCONAZOLE 150 MG/1
150 TABLET ORAL ONCE
Qty: 1 TABLET | Refills: 0 | Status: SHIPPED | OUTPATIENT
Start: 2022-05-18 | End: 2022-05-18

## 2022-05-18 ASSESSMENT — PAIN SCALES - GENERAL: PAINLEVEL: MODERATE PAIN (4)

## 2022-05-18 NOTE — PROGRESS NOTES
Assessment & Plan     Yeast vaginitis  Wet prep negative but symptoms suggest yeast vaginitis - treat with diflucan  - fluconazole (DIFLUCAN) 150 MG tablet  Dispense: 1 tablet; Refill: 0    Strain of lumbar region, initial encounter  Diffuse pain- trial of flexeril and physical therapy and follow up with us if no improvement in symptoms and consider imaging   - cyclobenzaprine (FLEXERIL) 10 MG tablet  Dispense: 90 tablet; Refill: 0  - Physical Therapy Referral    Cervical cancer screening    - Pap screen reflex to HPV if ASCUS - recommend age 25 - 29    Screening for hyperlipidemia  Needed for biometric forms- encouraged to schedule fasting labs   - Lipid panel reflex to direct LDL Fasting    Screening for diabetes mellitus  As above   - Hemoglobin A1c  - Glucose    Vaginal discharge    - Wet prep - Clinic Collect      Review of the result(s) of each unique test - wet prep  Ordering of each unique test  Prescription drug management         Patient Instructions   Take diflucan 150 mg for one dose  Continue ibuprofen up to 3 over the counter tablets four times a day with food as needed  Take flexeril 10 mg three times a day as needed for pain.  This may make you drowsy  Please schedule a fasting lab appointment (nothing to eat or drink 9 hours prior except water and/or your medications) at your earliest convenience.     I will notify you of lab results via MyChart   Follow up with physical therapy for back pain.  Follow up with us if no improvement over the next 3 weeks with physical therapy       Return in about 3 weeks (around 6/8/2022), or if symptoms worsen or fail to improve, for in person.    JOE [facility] [place]    Subjective   Jargalzaya is a 26 year old who presents for the following health issues     History of Present Illness       Back Pain:  She presents for follow up of back pain. Patient's back pain is a recurring problem.  Location of back pain:  Right lower  back, left lower back, right middle of back, left middle of back and other  Description of back pain: dull ache  Back pain spreads: right buttocks, right thigh and right foot    Since patient first noticed back pain, pain is: always present, but gets better and worse  Does back pain interfere with her job:  No      Reason for visit:  1.Yeast infection 2. Back pain 3. Early satiety  Symptom onset:  3-7 days ago  Symptoms include:  Itch and white discharge. Back pain and right leg hurt  Symptom intensity:  Moderate  Symptom progression:  Staying the same  Had these symptoms before:  Yes  Has tried/received treatment for these symptoms:  Yes  Previous treatment was successful:  Yes  Prior treatment description:  Antifungal treatment  What makes it worse:  No  What makes it better:  Yes    She eats 2-3 servings of fruits and vegetables daily.She consumes 0 sweetened beverage(s) daily.She exercises with enough effort to increase her heart rate 20 to 29 minutes per day.  She exercises with enough effort to increase her heart rate 4 days per week.   She is taking medications regularly.     No weight loss.   was treated for yeast infection and his symptoms resolved.    Has had thick vaginal discharge with itching - has biometric form with her to complete fasting labs but not fasting today   No history of abnromal pap  Last pap 3 yrs ago   Ibuprofen for back pain helpful-only takes one or two a week- is helpful   Has never tried muscle relaxer   Back pain started 2015. No history of trauma or injury  Right leg numbness.  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      Objective    /76   Pulse 84   Temp 99.1  F (37.3  C) (Tympanic)   Resp 18   Ht 1.549 m  "(5' 1\")   Wt 48.4 kg (106 lb 9.6 oz)   LMP 05/01/2022 (Exact Date)   SpO2 99%   BMI 20.14 kg/m    Body mass index is 20.14 kg/m .  Physical Exam   GENERAL: healthy, alert and no distress  NECK: no adenopathy, no asymmetry, masses, or scars and thyroid normal to palpation  RESP: lungs clear to auscultation - no rales, rhonchi or wheezes  BREAST: normal without masses, tenderness or nipple discharge and no palpable axillary masses or adenopathy  CV: regular rate and rhythm, normal S1 S2, no S3 or S4, no murmur, click or rub, no peripheral edema and peripheral pulses strong  ABDOMEN: soft, nontender, no hepatosplenomegaly, no masses and bowel sounds normal   (female): normal female external genitalia, normal urethral meatus, vaginal mucosa, normal cervix/adnexa/uterus without masses or discharge  MS: tender diffuse back. Normal gait, patellar reflexes +2, normal sensation     Results for orders placed or performed in visit on 05/18/22   Wet prep - Clinic Collect     Status: Abnormal    Specimen: Vagina; Swab   Result Value Ref Range    Trichomonas Absent Absent    Yeast Absent Absent    Clue Cells Absent Absent    WBCs/high power field 1+ (A) None               "

## 2022-05-18 NOTE — RESULT ENCOUNTER NOTE
Dear Pam  Your wet prep showed some white blood cells but nothing else.   Let's see if your symptoms improve with the diflucan.  Please call or MyChart my office with any questions or concerns.   Grace Willis, PAC

## 2022-05-18 NOTE — PATIENT INSTRUCTIONS
Take diflucan 150 mg for one dose  Continue ibuprofen up to 3 over the counter tablets four times a day with food as needed  Take flexeril 10 mg three times a day as needed for pain.  This may make you drowsy  Please schedule a fasting lab appointment (nothing to eat or drink 9 hours prior except water and/or your medications) at your earliest convenience.     I will notify you of lab results via [employer]   Follow up with physical therapy for back pain.  Follow up with us if no improvement over the next 3 weeks with physical therapy

## 2022-05-23 LAB
BKR LAB AP GYN ADEQUACY: ABNORMAL
BKR LAB AP GYN INTERPRETATION: ABNORMAL
BKR LAB AP HPV REFLEX: ABNORMAL
BKR LAB AP LMP: ABNORMAL
BKR LAB AP PREVIOUS ABNORMAL: ABNORMAL
PATH REPORT.COMMENTS IMP SPEC: ABNORMAL
PATH REPORT.COMMENTS IMP SPEC: ABNORMAL
PATH REPORT.RELEVANT HX SPEC: ABNORMAL

## 2022-05-24 ENCOUNTER — TELEPHONE (OUTPATIENT)
Dept: FAMILY MEDICINE | Facility: CLINIC | Age: 27
End: 2022-05-24
Payer: COMMERCIAL

## 2022-05-24 ENCOUNTER — PATIENT OUTREACH (OUTPATIENT)
Dept: FAMILY MEDICINE | Facility: CLINIC | Age: 27
End: 2022-05-24
Payer: COMMERCIAL

## 2022-05-24 DIAGNOSIS — R87.612 LOW GRADE SQUAMOUS INTRAEPITHELIAL LESION ON CYTOLOGIC SMEAR OF CERVIX (LGSIL): Primary | ICD-10-CM

## 2022-05-24 NOTE — TELEPHONE ENCOUNTER
Patient's  is calling to schedule a colposcopy.  The earliest I can find is 9/2022.  He would like the referral sent to [facility].  Please advise when sent, so they are able to schedule.  Thank you  Rhoda Anderson

## 2022-05-24 NOTE — TELEPHONE ENCOUNTER
Pt gave us biometric screening form, but needs a fasting lab appt for the labs requested on the biometric form. Called pt and LVM asking her to make appt.

## 2022-05-31 ENCOUNTER — TELEPHONE (OUTPATIENT)
Dept: OBGYN | Facility: CLINIC | Age: 27
End: 2022-05-31
Payer: COMMERCIAL

## 2022-05-31 NOTE — TELEPHONE ENCOUNTER
Patient has colp set up in [place] on 6/13, was looking for sooner appointment in [place] with female provider. No openings until mid July, patient will be keeping appointment in [place]    Keyanna Kee MA

## 2022-05-31 NOTE — TELEPHONE ENCOUNTER
[facility] Call Center    Phone Message    May a detailed message be left on voicemail: yes     Reason for Call: Pt's  is requesting a call back to reschedule his wife's colposcopy at the [facility].  Thanks.    Action Taken: Message routed to:  [facility] p [phone]    Travel Screening: Not Applicable

## 2022-06-06 DIAGNOSIS — Z01.812 PRE-PROCEDURE LAB EXAM: Primary | ICD-10-CM

## 2022-06-09 NOTE — PROGRESS NOTES
[facility]  OB/GYN Clinic Note    INDICATIONS:                                                    Is a pregnancy test required: Yes.  Was it positive or negative?  Negative  Was a consent obtained?  Yes    Pam Thompson, is a 26 year old female, who had a recent LGSIL pap.  HPV Not done No prior history of abnormal pap. Here today for colposcopy. Discussed indication, risks of infection and bleeding.    Her last pap was LSIL (CIN1) 5/18/2022    PROCEDURE:                                                      Ext: unremarkable  EGBUS: On left vulva, two small macular nevi, one nevus on left vulva. Normal appearing  Vagina:No lesions    Cervix is stained with acetic acid and viewed colposcopically.     Squamocolumnar junction is visualized in it's entirety. visible lesion(s) at 1 o'clock and mosaicism noted throught SCJ, notable at biopsy sites at 6, 12, and 1 o'clock . Biopsy done Yes. Endocervical curretage Done       POST PROCEDURE:                                                      IMPRESSION: LETTY 2    PLAN : Await the results of the biopsies.  She experienced moderate discomfort during the procedure. There were no complications. Patient was discharged in stable condition.  The significance of pap smear and its abnormality and HPV discussed at length.  Discussed methods to minimize risk of cervical cancer: condom use, tobacco use, HPV vaccine. Will contact to schedule vaccine visit.   Follow-up pending results.  Patient advised to call the clinic if excessive bleeding, pelvic pain, or fever.     Follow-up based on pathology results.  Wilma Hutchison MD

## 2022-06-13 ENCOUNTER — LAB (OUTPATIENT)
Dept: LAB | Facility: CLINIC | Age: 27
End: 2022-06-13
Payer: COMMERCIAL

## 2022-06-13 ENCOUNTER — OFFICE VISIT (OUTPATIENT)
Dept: OBGYN | Facility: CLINIC | Age: 27
End: 2022-06-13

## 2022-06-13 VITALS
BODY MASS INDEX: 20.2 KG/M2 | HEART RATE: 64 BPM | SYSTOLIC BLOOD PRESSURE: 98 MMHG | HEIGHT: 61 IN | DIASTOLIC BLOOD PRESSURE: 60 MMHG | WEIGHT: 107 LBS

## 2022-06-13 DIAGNOSIS — R87.612 LGSIL ON PAP SMEAR OF CERVIX: Primary | ICD-10-CM

## 2022-06-13 DIAGNOSIS — Z01.812 PRE-PROCEDURE LAB EXAM: ICD-10-CM

## 2022-06-13 DIAGNOSIS — R87.612 LOW GRADE SQUAMOUS INTRAEPITHELIAL LESION ON CYTOLOGIC SMEAR OF CERVIX (LGSIL): ICD-10-CM

## 2022-06-13 LAB — HCG UR QL: NEGATIVE

## 2022-06-13 PROCEDURE — 81025 URINE PREGNANCY TEST: CPT

## 2022-06-13 PROCEDURE — 90651 9VHPV VACCINE 2/3 DOSE IM: CPT | Performed by: STUDENT IN AN ORGANIZED HEALTH CARE EDUCATION/TRAINING PROGRAM

## 2022-06-13 PROCEDURE — 90471 IMMUNIZATION ADMIN: CPT | Performed by: STUDENT IN AN ORGANIZED HEALTH CARE EDUCATION/TRAINING PROGRAM

## 2022-06-13 PROCEDURE — 57454 BX/CURETT OF CERVIX W/SCOPE: CPT | Performed by: STUDENT IN AN ORGANIZED HEALTH CARE EDUCATION/TRAINING PROGRAM

## 2022-06-13 PROCEDURE — 88305 TISSUE EXAM BY PATHOLOGIST: CPT | Performed by: PATHOLOGY

## 2022-06-13 NOTE — NURSING NOTE
Prior to immunization administration, verified patients identity using patient s name and date of birth. Please see Immunization Activity for additional information.     Screening Questionnaire for Adult Immunization    Are you sick today?   No   Do you have allergies to medications, food, a vaccine component or latex?   No   Have you ever had a serious reaction after receiving a vaccination?   No   Do you have a long-term health problem with heart, lung, kidney, or metabolic disease (e.g., diabetes), asthma, a blood disorder, no spleen, complement component deficiency, a cochlear implant, or a spinal fluid leak?  Are you on long-term aspirin therapy?   No   Do you have cancer, leukemia, HIV/AIDS, or any other immune system problem?   No   Do you have a parent, brother, or sister with an immune system problem?   No   In the past 3 months, have you taken medications that affect  your immune system, such as prednisone, other steroids, or anticancer drugs; drugs for the treatment of rheumatoid arthritis, Crohn s disease, or psoriasis; or have you had radiation treatments?   No   Have you had a seizure, or a brain or other nervous system problem?   No   During the past year, have you received a transfusion of blood or blood    products, or been given immune (gamma) globulin or antiviral drug?   No   For women: Are you pregnant or is there a chance you could become       pregnant during the next month?   No   Have you received any vaccinations in the past 4 weeks?   No     Immunization questionnaire answers were all negative.        Per orders of Dr. Hutchison, injection of HPV given by Mirta Oliveira CMA. Patient instructed to remain in clinic for 15 minutes afterwards, and to report any adverse reaction to me immediately.       Screening performed by Mirta Oliveira CMA on 6/13/2022 at 11:18 AM.

## 2022-06-15 ENCOUNTER — TELEPHONE (OUTPATIENT)
Dept: FAMILY MEDICINE | Facility: CLINIC | Age: 27
End: 2022-06-15
Payer: COMMERCIAL

## 2022-06-15 ENCOUNTER — NURSE TRIAGE (OUTPATIENT)
Dept: NURSING | Facility: CLINIC | Age: 27
End: 2022-06-15
Payer: COMMERCIAL

## 2022-06-15 ENCOUNTER — TELEPHONE (OUTPATIENT)
Dept: OBGYN | Facility: CLINIC | Age: 27
End: 2022-06-15
Payer: COMMERCIAL

## 2022-06-15 DIAGNOSIS — Z86.19 HISTORY OF HELICOBACTER PYLORI INFECTION: Primary | ICD-10-CM

## 2022-06-15 NOTE — TELEPHONE ENCOUNTER
Pt's spouse called and said that patient is feeling super dizzy and is feverish and has been steadily getting worse since her colpo on Monday. Please call them back asap.

## 2022-06-15 NOTE — TELEPHONE ENCOUNTER
Please let  know this can wait for PCP to return or they should have an appointment to talk about ordering both colonoscopy and EGD before seeing GI if they want both ordered sooner than next week. Virtual appointment okay.     Thank you,  ROMY Gustafson, NP-C  [facility]

## 2022-06-15 NOTE — TELEPHONE ENCOUNTER
"Call from spouse, see triage note  Going to ED    Nurse Triage SBAR    Is this a 2nd Level Triage? YES, LICENSED PRACTITIONER REVIEW IS REQUIRED    Situation:   abdominal pain since procedure on 6/13/22    Background:   had colposcopy 6/13/22    Assessment:   further evaluation by ED today    Protocol Recommended Disposition:   Go to ED Now      Reason for Disposition    SEVERE abdominal pain (e.g., excruciating)    Additional Information    Negative: Passed out (i.e., fainted, collapsed and was not responding)    Negative: Shock suspected (e.g., cold/pale/clammy skin, too weak to stand, low BP, rapid pulse)    Negative: Sounds like a life-threatening emergency to the triager    Negative: Chest pain    Negative: Pain is mainly in upper abdomen (if needed ask: 'is it mainly above the belly button?')    Negative: Abdominal pain and pregnant > 20 weeks    Negative: Abdominal pain and pregnant < 20 weeks    Answer Assessment - Initial Assessment Questions  1. LOCATION: \"Where does it hurt?\"       Lower abdomen  2. RADIATION: \"Does the pain shoot anywhere else?\" (e.g., chest, back)      Spouse does not think it radiates  3. ONSET: \"When did the pain begin?\" (e.g., minutes, hours or days ago)       Has had since procedure on 6/13/22  4. SUDDEN: \"Gradual or sudden onset?\"      sudden  5. PATTERN \"Does the pain come and go, or is it constant?\"     - If constant: \"Is it getting better, staying the same, or worsening?\"       (Note: Constant means the pain never goes away completely; most serious pain is constant and it progresses)      - If intermittent: \"How long does it last?\" \"Do you have pain now?\"      (Note: Intermittent means the pain goes away completely between bouts)      Has pain now, constant  6. SEVERITY: \"How bad is the pain?\"  (e.g., Scale 1-10; mild, moderate, or severe)    - MILD (1-3): doesn't interfere with normal activities, abdomen soft and not tender to touch     - MODERATE (4-7): interferes with normal " "activities or awakens from sleep, tender to touch     - SEVERE (8-10): excruciating pain, doubled over, unable to do any normal activities       8/10  7. RECURRENT SYMPTOM: \"Have you ever had this type of abdominal pain before?\" If so, ask: \"When was the last time?\" and \"What happened that time?\"       Has not had before  8. CAUSE: \"What do you think is causing the abdominal pain?\"      unsure  9. RELIEVING/AGGRAVATING FACTORS: \"What makes it better or worse?\" (e.g., movement, antacids, bowel movement)      Nothing has helped  10. OTHER SYMPTOMS: \"Has there been any vomiting, diarrhea, constipation, or urine problems?\"        Having brown vaginal discharge, had been bloody until yesterday.  Is chilling, has no thermometer to check temp.  Is dizzy when stands, this is new today  11. PREGNANCY: \"Is there any chance you are pregnant?\" \"When was your last menstrual period?\"        N/A    Protocols used: ABDOMINAL PAIN - FEMALE-A-OH      "

## 2022-06-15 NOTE — TELEPHONE ENCOUNTER
Spoke with patients , he stated they are wanting to have the gastroscopy and colonoscopy prior to seeing GI so they have the results when they go to see GI.  Please advise

## 2022-06-15 NOTE — TELEPHONE ENCOUNTER
Patients  calling - CTC on file.     1. Pt needing assistance setting up labs for Grace Willis - lab orders placed, Pt scheduled for labs tomorrow morning at  clinic.     2. Pt is requesting a referral to GI due to past H Pylori infections and would like to see a specialist.   Routing to provider to review if referral appropriate?     Pt  would like call back if referral able to be placed ([phone])    Bruna Castrejon RN, BSN  [facility]

## 2022-06-16 ENCOUNTER — TELEPHONE (OUTPATIENT)
Dept: OBGYN | Facility: CLINIC | Age: 27
End: 2022-06-16

## 2022-06-16 ENCOUNTER — LAB (OUTPATIENT)
Dept: LAB | Facility: CLINIC | Age: 27
End: 2022-06-16
Payer: COMMERCIAL

## 2022-06-16 DIAGNOSIS — Z13.1 SCREENING FOR DIABETES MELLITUS: ICD-10-CM

## 2022-06-16 DIAGNOSIS — Z13.220 SCREENING FOR HYPERLIPIDEMIA: ICD-10-CM

## 2022-06-16 LAB
CHOLEST SERPL-MCNC: 195 MG/DL
FASTING STATUS PATIENT QL REPORTED: YES
FASTING STATUS PATIENT QL REPORTED: YES
GLUCOSE BLD-MCNC: 88 MG/DL (ref 70–99)
HBA1C MFR BLD: 5.3 % (ref 0–5.6)
HDLC SERPL-MCNC: 63 MG/DL
LDLC SERPL CALC-MCNC: 117 MG/DL
NONHDLC SERPL-MCNC: 132 MG/DL
PATH REPORT.COMMENTS IMP SPEC: NORMAL
PATH REPORT.COMMENTS IMP SPEC: NORMAL
PATH REPORT.FINAL DX SPEC: NORMAL
PATH REPORT.GROSS SPEC: NORMAL
PATH REPORT.MICROSCOPIC SPEC OTHER STN: NORMAL
PATH REPORT.RELEVANT HX SPEC: NORMAL
PHOTO IMAGE: NORMAL
TRIGL SERPL-MCNC: 76 MG/DL

## 2022-06-16 PROCEDURE — 80061 LIPID PANEL: CPT

## 2022-06-16 PROCEDURE — 36415 COLL VENOUS BLD VENIPUNCTURE: CPT

## 2022-06-16 PROCEDURE — 83036 HEMOGLOBIN GLYCOSYLATED A1C: CPT

## 2022-06-16 PROCEDURE — 82947 ASSAY GLUCOSE BLOOD QUANT: CPT

## 2022-06-16 NOTE — TELEPHONE ENCOUNTER
See other telephone encounter. Patient treated by other Ob/Gyn department and call already triaged.    Will close this encounter.    Sherman Hughes, CMA

## 2022-06-16 NOTE — TELEPHONE ENCOUNTER
Spoke with patient, explained to patient that patient will need to be seen by Dr. Jean because she has not been seen within 30 days.  Writer advised him they could go to GI first and they will put the orders in if they feel she needs those tests.

## 2022-06-16 NOTE — TELEPHONE ENCOUNTER
Patient's spouse is calling today to tell us that pt is having pain after her Colposcopy. She has been taking Tylenol. Please call him back ASAP

## 2022-06-16 NOTE — TELEPHONE ENCOUNTER
[facility] Call Center    Phone Message    May a detailed message be left on voicemail: yes     Reason for Call: Pt's  is requesting a call back to discuss pelvic pain that his wife is having after a colposcopy.  Thanks.    Action Taken: Message routed to:  [facility] p [phone]    Travel Screening: Not Applicable

## 2022-06-16 NOTE — RESULT ENCOUNTER NOTE
Julio Orozco,   Your cholesterol is slightly improved from 10 months ago. Your fasting glucose is normal. If you had forms we needed to fill out for biometric screening please let us know.   Thank you,  ROMY Gustafson, NP-C  [facility]  (For MAGDI Kerns who is out of the office)

## 2022-06-16 NOTE — TELEPHONE ENCOUNTER
6/13/22 OV for Colposcopy-bx     calling about wife:  Pelvic pain complaints that is worsening: cramping initially after procedure and now all over abdomen and pelvic tenderness.  Taking Ibuprofen 600mg  Chills yesterday  No fever and no chills today but pain is concerning and wants to be seen by a provider.    No GYN visits available; Scheduled w S Edu NP at 1350    Encouraged tyl/motrin alternating and heat for pain.  Seen in UC or ER if develops fever.    Lorena Lester RN on 6/16/2022 at 9:42 AM

## 2022-06-17 NOTE — TELEPHONE ENCOUNTER
When called pt w pathology results,-spoke with :  asked how pt is doing. See that they cancelled the apt made yesterday w nurse practitioner to f/u on pain. He said she took an ibuprofen and she slept and felt better so they cancelled.    Pain is getting better today and taking ibuprofen for discomfort. Vaginal discharge brown - reassured normal and expected.    Instructed to push fluids, can continue ibuprofen for pain management and be seen in UC or ER for severe pain, heavy bleeding or fever.    Pt verbalized understanding, in agreement with plan, and voiced no further questions.  Lorena Lester RN on 6/17/2022 at 8:54 AM

## 2022-06-20 ENCOUNTER — PATIENT OUTREACH (OUTPATIENT)
Dept: OBGYN | Facility: CLINIC | Age: 27
End: 2022-06-20
Payer: COMMERCIAL

## 2022-06-28 ENCOUNTER — MYC MEDICAL ADVICE (OUTPATIENT)
Dept: FAMILY MEDICINE | Facility: CLINIC | Age: 27
End: 2022-06-28

## 2022-07-13 ENCOUNTER — TELEPHONE (OUTPATIENT)
Dept: FAMILY MEDICINE | Facility: CLINIC | Age: 27
End: 2022-07-13

## 2022-07-13 NOTE — TELEPHONE ENCOUNTER
Left message for  to return call, form has been faxed twice, is there a different number we can fax to?

## 2022-07-13 NOTE — TELEPHONE ENCOUNTER
calling and states that his wife dropped off a [employer] form at her last visit with Grace Willis on 5/18/2022. He states that is coming up on being due soon and wants to know if this has been sent. Please advise.  Nia Taylor MA  [facility]  2nd Floor  Primary Care

## 2022-07-13 NOTE — TELEPHONE ENCOUNTER
Diagnosis, Referred by & from: History of Helicobacter pylori infection [Z86.19] by Marguerite Norris NP   Appt date: 07/14/2022   NOTES STATUS DETAILS   OFFICE NOTE from referring provider Internal  06/15/2022 - Marguerite Norris NP   OFFICE NOTE from other specialist N/A    DISCHARGE SUMMARY from hospital Internal 10/27/2020 - [facility]    DISCHARGE REPORT from the ER N/A    OPERATIVE REPORT N/A    MEDICATION LIST Internal N/A   LABS     ANAL PAP/CEA N/A    BIOPSIES/PATHOLOGY RELATED TO DIAGNOSIS N/A    DIAGNOSTIC PROCEDURES     PFC TESTING (from the Pelvic floor center includes Manometry, PDNL, EMG, etc.) N/A    COLONOSCOPY N/A    UPPER ENDOSCOPY (EGD) N/A    FLEX SIGMOIDOSCOPY N/A    ERCP N/A    IMAGING (DISC & REPORT)      CT N/A    MRI N/A    XRAY Internal 12/24/2021 [facility] - Dr. ZE Jean   ULTRASOUND  (ENDOANAL/ENDORECTAL) Internal 10/01/2021 [facility] - Dr. HAYLEE Garcia

## 2022-07-14 ENCOUNTER — PRE VISIT (OUTPATIENT)
Dept: GASTROENTEROLOGY | Facility: CLINIC | Age: 27
End: 2022-07-14

## 2022-07-14 ENCOUNTER — VIRTUAL VISIT (OUTPATIENT)
Dept: GASTROENTEROLOGY | Facility: CLINIC | Age: 27
End: 2022-07-14
Attending: NURSE PRACTITIONER
Payer: COMMERCIAL

## 2022-07-14 ENCOUNTER — TELEPHONE (OUTPATIENT)
Dept: GASTROENTEROLOGY | Facility: CLINIC | Age: 27
End: 2022-07-14

## 2022-07-14 VITALS — BODY MASS INDEX: 19.99 KG/M2 | WEIGHT: 105.82 LBS

## 2022-07-14 DIAGNOSIS — Z83.719 FAMILY HX COLONIC POLYPS: ICD-10-CM

## 2022-07-14 DIAGNOSIS — Z86.19 HISTORY OF HELICOBACTER PYLORI INFECTION: ICD-10-CM

## 2022-07-14 DIAGNOSIS — R13.19 ESOPHAGEAL DYSPHAGIA: Primary | ICD-10-CM

## 2022-07-14 DIAGNOSIS — K59.00 CONSTIPATION, UNSPECIFIED CONSTIPATION TYPE: ICD-10-CM

## 2022-07-14 PROCEDURE — 99204 OFFICE O/P NEW MOD 45 MIN: CPT | Mod: 95 | Performed by: PHYSICIAN ASSISTANT

## 2022-07-14 ASSESSMENT — PAIN SCALES - GENERAL: PAINLEVEL: MODERATE PAIN (5)

## 2022-07-14 NOTE — TELEPHONE ENCOUNTER
Central Prior Authorization Team   Phone: [phone]      Requested medication was processed through [employer] portal - currently waiting on insurance response:  Prior Authorization History    linaclotide (LINZESS) 145 MCG capsule     History    View all authorizations for this medication     Closed  7/14/2022  9:26 AM  Case ID: [id number] Close reason: Other   Note from payer: The payer will call or fax you with the PA outcome when it has been determined.   Payer:  [employer] Off Ramp        Waiting for Payer Response  7/14/2022  9:26 AM  Case ID: [id number] Reply deadline: August 4, 2022 8:05 AM Sending user: Wong Martinez   Note from payer: If you have an attachment to add, please visit this URL: [email or web address] and enter the code [email or web address]   Note to payer: Please send all additional information such as, questions, approvals, or denial information to: Central Prior Authorization Team Phone: [phone] Fax: [phone] Thank you!    Payer:  [employer] Off Ramp    [phone]      uplan_constipation  Plan Name: [facility] UPlan, Drug Group Name: Medication Specific                                                                                                                                                                                                     Closed  7/14/2022  8:05 AM  Close reason: Cannot find matching patient   Note from payer: Eligibility could not be verified for this patient - patient not found. Please review patient information and re-submit.   Payer:  [employer]    [phone]         Waiting for Payer Response  7/14/2022  8:05 AM  Sending user: Mariam Meeks PA-C   Payer:  [employer] Generic Payer

## 2022-07-14 NOTE — PROGRESS NOTES
aPm is a 26 year old who is being evaluated via a billable video visit.      How would you like to obtain your AVS? MyChart  If the video visit is dropped, the invitation should be resent by: Text to cell phone: [phone]  Will anyone else be joining your video visit? Yes: Spouse - Gunjan. How would they like to receive their invitation? Other e-mail: N/A       Roomed by Virtual Facilitator: Edita David      GASTROENTEROLOGY NEW PATIENT VIDEO VISIT     Video Start Time: 7:30 AM    CC/REFERRING MD:    [facility] Rekha Norris    REASON FOR CONSULTATION:   Referred by Marguerite Norris for Video Visit      HISTORY OF PRESENT ILLNESS:    Pam Thompson is 26 year old female who presents for GI consult.      is present on the call and assists with history. She has abdominal pain and discomfort that occurs after eating. At times this leads to skipping of meals. She does have history of H. pylori infection treated twice in the last year. First noted about 7 months ago. Unfortunately treatment failed and she was re-treated about 5 months ago. She was to have eradication testing afterwards but did not complete this. She believes she still has H pylori because she continues to have symptoms. Besides the abdominal pain she is also feeling globus sensation and dysphagia. At times it take longer to push food downs. Happens with some solid foods, but unable to elaborate. Does not occur with liquids. No nausea. No vomiting. No hematochezia. No melena. She does have heartburn/reflux. She did have H pylori about 15 years ago as well. She does take NSAIDs on occasion usually for HA. Doesn't take it very often.       She also has constipation, for roughly 15 years. She has tried many remedies otc including miralax, dulcolax, fiber and other laxatives without much success. She typically has a BM every 2-3 times a week. There is family hx of constipation as well.     Patient and  are  concerned about colon cancer. They are [place] and report there being a higher incidence of colon cancer in [facility]. They also report that there is a family hx of colon cancer in her Aunt who had metastasis to the liver. Her dad had colon polyps on his colonoscopyLuis Eduardo Orozco  has a past medical history of History of colposcopy (2022) and Migraine.    She  has a past surgical history that includes appendectomy and  section (N/A, 10/27/2020).    She  reports that she has never smoked. She has never used smokeless tobacco. She reports previous alcohol use. She reports that she does not use drugs.    Her family history includes Diabetes in her father.    ALLERGIES:  Watermelon flavor, Bananas [banana], Cefazolin, Cephalosporins, and Pineapple      PERTINENT MEDICATIONS:    Current Outpatient Medications:      cyclobenzaprine (FLEXERIL) 10 MG tablet, Take 1 tablet (10 mg) by mouth 3 times daily as needed for muscle spasms, Disp: 90 tablet, Rfl: 0      PHYSICAL EXAMINATION:  Constitutional: aaox3, cooperative, pleasant, not dyspneic/diaphoretic, no acute distress      GENERAL: Healthy, alert and no distress  EYES: Eyes grossly normal to inspection.  No discharge or erythema, or obvious scleral/conjunctival abnormalities.  RESP: No audible wheeze, cough, or visible cyanosis.  No visible retractions or increased work of breathing.    SKIN: Visible skin clear. No significant rash, abnormal pigmentation or lesions.  NEURO: Cranial nerves grossly intact.  Mentation and speech appropriate for age.  PSYCH: Mentation appears normal, affect normal/bright, judgement and insight intact, normal speech and appearance well-groomed.          PERTINENT STUDIES: (I personally reviewed these laboratory studies today)  Most recent CBC:   Recent Labs   Lab Test 21  1150 21  0821   WBC 6.7 6.0   HGB 13.8 13.2   HCT 41.5 38.7    303     Most recent hepatic panel:  Recent Labs   Lab Test  12/24/21  1150 11/17/21  0821   ALT 27 26   AST 20 12     Most recent creatinine:  Recent Labs   Lab Test 12/24/21  1150 11/17/21  0821   CR 0.58 0.54         ASSESSMENT/PLAN:    1. Esophageal dysphagia  - Adult GI  Referral - Procedure Only; Future  2. History of Helicobacter pylori infection  - Adult GI  Referral - Procedure Only; Future    3. Constipation, unspecified constipation type  - linaclotide (LINZESS) 145 MCG capsule; Take 1 capsule (145 mcg) by mouth every morning (before breakfast)  Dispense: 30 capsule; Refill: 2  - Adult GI  Referral - Procedure Only; Future    4. Family hx colonic polyps          Pam Thompson is a 26 year old female who presents for GI consult. Primary concern is regarding H pylori. She has been treated for h pylori twice in the last year. She was supposed to have testing for eradication several months ago but never followed through. She however continues to have upper GI symptoms raising concern that she may still have H pylori. She also describes dysphagia that has developed in the past few months. Recommend EGD to assess new dysphagia and hx of h pylori infections. If found to be positive for H pylori will refer to MTM to determine appropriate therapy. If h pylori negative, consider PPI for continued symptoms. Avoid NSAID's.     She is concerned about colon cancer due to their being a higher incidence of colon cancer in [facility]. She however does not qualify for screening colonoscopy given their is not a first degree relative with colon cancer or a documented family hx of advanced adenoma. She does however have a long history of constipation and has tried and failed multiple remedies otc. Recommended trial of linzess 145 mcg. Consider increasing if not effective at standard dose. Can pursue a colonoscopy given her long hx of constipation.       Further recommendations after work up.     Thank you for this consultation.  It was a pleasure to  participate in the care of this patient; please contact us with any further questions.        This note was created with voice recognition software, and while reviewed for accuracy, typos may remain.       45 minutes spent on the date of the encounter doing chart review, history and exam, documentation and further activities per the note    Mariam Meeks PA-C  Gastroenterology  [facility]       Video-Visit Details    Type of service:  Video Visit    Video End Time: 7:55 AM    Originating Location (pt. Location): Home    Distant Location (provider location):  [facility]     Platform used for Video Visit: [employer]

## 2022-07-14 NOTE — PATIENT INSTRUCTIONS
It was a pleasure visiting with you today.     Start taking linzess for constipation. This is to be taken once a day every morning.     Please schedule your upper endoscopy and colonoscopy. If you have not heard from the scheduling office within 2 business days, please call [phone] to schedule.     Avoid NSAID's as this can bother the stomach.     Mariam Meeks PA-C  Gastroenterology  [facility]

## 2022-07-15 NOTE — TELEPHONE ENCOUNTER
I was able to find something from 2021, which I did provide this information to the Gastro Nurse from : Aissatou Gregorio  that I spoke too.

## 2022-07-15 NOTE — TELEPHONE ENCOUNTER
Central Prior Authorization Team   Phone: [phone]      PRIOR AUTHORIZATION DENIED    Medication: linaclotide (LINZESS) 145 MCG capsule - Denied     Denial Date: 7/14/2022    Denial Rational: This product is not covered under the pharmacy benefit.      Appeal Information: If the Provider/Patient would like to appeal this denial, please provide a letter of medical necessity explaining why Preferred Formulary medications are not appropriate in the treatment of the patient's condition; along with any previous therapies tried/failed.    Once completed, please route back to me directly: Wong Martinez

## 2022-07-15 NOTE — CONFIDENTIAL NOTE
Spoke with Wong from central PA team regarding a list of the preferred formulary medications that were not listed in the denial.   Wong did find/share information from 2021 including lactulose and Golytely/Nulytely (tier 1) and Symproic and Trulance (tier 2 - would need PA).   Wong did mention that lomotil and loperimide were also covered.     Forwarding to provider to inquire if appeal is desired.     Aissatou Gregorio RN

## 2022-07-19 ENCOUNTER — TELEPHONE (OUTPATIENT)
Dept: GASTROENTEROLOGY | Facility: CLINIC | Age: 27
End: 2022-07-19

## 2022-07-19 ENCOUNTER — HOSPITAL ENCOUNTER (OUTPATIENT)
Facility: AMBULATORY SURGERY CENTER | Age: 27
End: 2022-07-19
Attending: INTERNAL MEDICINE
Payer: COMMERCIAL

## 2022-07-19 NOTE — TELEPHONE ENCOUNTER
Screening Questions  BlueKIND OF PREP RedLOCATION [review exclusion criteria] GreenSEDATION TYPE      1.  YES Are you active on mychart?       2.  Mariam Meeks PA-C Ordering/Referring Provider:      3. medica  What insurance is in the chart?    4.  Y Do you have a legal guardian or medical Power of ?              Are you able to give consent for your medical care?                (Sedation review/consideration needed)      5.   19.99  BMI  [BMI OVER 40-EXTENDED PREP]  If greater than 40 review exclusion criteria [PAC APPT IF @ [facility]]       6.   N Have you had a positive covid test in the last 90 days?      7.   Respiratory Screening :  [If yes to any of the following HOSPITAL setting only]                     N Do you use daily home oxygen?   N Do you have mod to severe Obstructive Sleep Apnea?  [OKAY @ [facility] SH PH RI]                  N Do you have Pulmonary Hypertension?                   N Do you have UNCONTROLLED asthma?         8.   N Have you had a heart or lung transplant?       9.   N Are you currently on dialysis?   [ If yes, G-PREP & HOSPITAL setting only]      10.   N  Do you have chronic kidney disease?  [ If yes, G-PREP ]     11.  N Have you had a stroke or Transient ischemic attack (TIA - aka  mini stroke ) within 6 months?   (If yes, please review exclusion criteria)     12.   N In the past 6 months, have you had any heart related issues including cardiomyopathy or heart attack?           N If yes, did it require cardiac stenting or other implantable device?       13.   N Do you have any implantable devices in your body (pacemaker, defib, LVAD)?  (If yes, please review exclusion criteria)     14.   N Do you take nitroglycerin?            N If yes, how often? n  (if yes, HOSPITAL setting ONLY)     15.   N Are you currently taking any blood thinners?           [IF YES, INFORM PATIENT TO FOLLOW UP W/ ORDERING PROVIDER FOR BRIDGING INSTRUCTIONS]      16.    N  Do you have a diagnosis  of diabetes?  [ If yes, G-PREP ]     17.   [FEMALES] N Are you currently pregnant?    N If yes, how many weeks?      18.    N  Are you taking any prescription pain medications on a routine schedule?    [ If yes, EXTENDED PREP.] [If yes, MAC]     19.   N Do you have any chemical dependencies such as alcohol, street drugs, or methadone?   [If yes, MAC]     20.   N Do you have any history of post-traumatic stress syndrome, severe anxiety or history of psychosis?   [If yes, MAC]     21.   Y Do you transfer independently?       22.   Y  On a regular basis do you go 3-5 days between bowel movements?  [ If yes, EXTENDED PREP.]     23.    Preferred LOCAL Pharmacy for Pre Prescription       [id number] [place] [address] [address]          Scheduling Details       - BAT : Caller   (Please ask for phone number if not scheduled by patient)    Upper and Lower Endoscopy [EGD and Colonoscopy] : Type of Procedure Scheduled   EXTENDED Which Colonoscopy Prep was Sent?       Surgeon    09/21 Date of [place]   [facility] [place]    MOD Sedation Type     Conscious Sedation- Needs  for 6 hours after the procedure  MAC/General-Needs  for 24 hours after procedure     N Pre-op Required at [facility] [place], [place] and OR for MAC sedation   (advise patient they will need a pre-op prior to procedure -)       Y Informed patient they will need an adult    Cannot take any type of public or medical transportation alone     HOME Pre-Procedure [facility] or Externally     Y  Confirmed Nurse will call to complete assessment     Additional comments:        WANTED TO SCHEDULE WITH DR. DOUGLAS

## 2022-07-26 ENCOUNTER — TELEPHONE (OUTPATIENT)
Dept: GASTROENTEROLOGY | Facility: CLINIC | Age: 27
End: 2022-07-26

## 2022-07-26 NOTE — TELEPHONE ENCOUNTER
Caller:  of patient      Procedure: EGD/Colon    Date, Location, and Surgeon of Procedure Cancelled: 9/21, Sultan XIOMARA    Ordering Provider:CARLOS BARNES    Reason for cancel (please be detailed, any staff messages or encounters to note?): Patient request to reschedule with Dr Wynn.        Rescheduled: y     If rescheduled:    Date: 9/15   Location: [facility]   Prep Resent: resent, no changes (changes to prep?)   Covid Test Rescheduled: home test   Note any change or update to original order/sedation: okay with MAC

## 2022-08-23 ENCOUNTER — OFFICE VISIT (OUTPATIENT)
Dept: FAMILY MEDICINE | Facility: CLINIC | Age: 27
End: 2022-08-23
Payer: COMMERCIAL

## 2022-08-23 VITALS
HEIGHT: 61 IN | RESPIRATION RATE: 16 BRPM | BODY MASS INDEX: 20.39 KG/M2 | DIASTOLIC BLOOD PRESSURE: 69 MMHG | OXYGEN SATURATION: 98 % | HEART RATE: 76 BPM | SYSTOLIC BLOOD PRESSURE: 104 MMHG | WEIGHT: 108 LBS | TEMPERATURE: 98.7 F

## 2022-08-23 DIAGNOSIS — Z88.9 H/O MULTIPLE ALLERGIES: ICD-10-CM
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
## 2022-08-23 DIAGNOSIS — L50.9 URTICARIA: ICD-10-CM

## 2022-08-23 PROCEDURE — 99214 OFFICE O/P EST MOD 30 MIN: CPT | Performed by: INTERNAL MEDICINE

## 2022-08-23 RX ORDER — EPINEPHRINE 0.3 MG/.3ML
0.3 INJECTION SUBCUTANEOUS PRN
Qty: 2 EACH | Refills: 1 | Status: SHIPPED | OUTPATIENT
Start: 2022-08-23 | End: 2024-04-16

## 2022-08-23 RX ORDER — PREDNISONE 20 MG/1
20 TABLET ORAL DAILY
Qty: 3 TABLET | Refills: 0 | Status: SHIPPED | OUTPATIENT
Start: 2022-08-23 | End: 2022-10-12

## 2022-08-23 RX ORDER — DOXYCYCLINE 100 MG/1
100 CAPSULE ORAL 2 TIMES DAILY
Qty: 6 CAPSULE | Refills: 0 | Status: SHIPPED | OUTPATIENT
Start: 2022-08-23 | End: 2022-10-12

## 2022-08-23 RX ORDER — HYDROXYZINE HYDROCHLORIDE 25 MG/1
25 TABLET, FILM COATED ORAL EVERY 6 HOURS PRN
Qty: 16 TABLET | Refills: 0 | Status: ON HOLD | OUTPATIENT
Start: 2022-08-23 | End: 2022-09-15

## 2022-08-23 ASSESSMENT — PAIN SCALES - GENERAL: PAINLEVEL: WORST PAIN (10)

## 2022-08-23 NOTE — PROGRESS NOTES
Assessment & Plan     Bug bite, sequela    - hydrOXYzine (ATARAX) 25 MG tablet; Take 1 tablet (25 mg) by mouth every 6 hours as needed for itching  - predniSONE (DELTASONE) 20 MG tablet; Take 1 tablet (20 mg) by mouth daily  - Adult Allergy/Asthma Referral; Future  - doxycycline hyclate (VIBRAMYCIN) 100 MG capsule; Take 1 capsule (100 mg) by mouth 2 times daily    Urticaria    - hydrOXYzine (ATARAX) 25 MG tablet; Take 1 tablet (25 mg) by mouth every 6 hours as needed for itching  - predniSONE (DELTASONE) 20 MG tablet; Take 1 tablet (20 mg) by mouth daily  - Adult Allergy/Asthma Referral; Future  - EPINEPHrine (EPIPEN 2-RICO) 0.3 MG/0.3ML injection 2-pack; Inject 0.3 mLs (0.3 mg) into the muscle as needed for anaphylaxis May repeat one time in 5-15 minutes if response to initial dose is inadequate.    H/O multiple allergies    - Adult Allergy/Asthma Referral; Future  - EPINEPHrine (EPIPEN 2-RICO) 0.3 MG/0.3ML injection 2-pack; Inject 0.3 mLs (0.3 mg) into the muscle as needed for anaphylaxis May repeat one time in 5-15 minutes if response to initial dose is inadequate.             See Patient Instructions    Return if symptoms worsen or fail to improve, for Follow up, pcp.    Nigel Victor MD  [facility] VALENTINO Orozco is a 26 year old accompanied by her partner, presenting for the following health issues:  Derm Problem (Left heel area. Possible bug bite, red, blistered, foot swelling, itchy )      History of Present Illness       Reason for visit:  Mosquito bite, swollen ankle  Symptom onset:  1-3 days ago    She eats 2-3 servings of fruits and vegetables daily.She consumes 0 sweetened beverage(s) daily.She exercises with enough effort to increase her heart rate 10 to 19 minutes per day.  She exercises with enough effort to increase her heart rate 3 or less days per week.   She is taking medications regularly.     He was reviewed with the above-mentioned medical assistant  "note.    The patient was outside by her apartment building in the evening.  She states that she felt she was bitten several times by insects.  She was uncertain as to what form of insect.  Conjunction with the bite she developed redness and swelling.  No respiratory symptoms.  Of note patient has multiple allergies and would like allergy testing.    She denies any fevers.  Her left lower extremity has a bit more swelling and redness particularly near the bites.  These bites are accumulated near her Achilles tendon region          Review of Systems   Constitutional, HEENT, cardiovascular, pulmonary, gi and gu systems are negative, except as otherwise noted.      Objective    /69 (BP Location: Left arm, Patient Position: Chair, Cuff Size: Adult Regular)   Pulse 76   Temp 98.7  F (37.1  C) (Temporal)   Resp 16   Ht 1.549 m (5' 1\")   Wt 49 kg (108 lb)   SpO2 98%   BMI 20.41 kg/m    Body mass index is 20.41 kg/m .  Physical Exam   Lower extremities with 1+ pitting edema several lesions consistent with insect bites.  Urticaria has resolved.    Left lower extremity.  The erythema associated with the region of previous urticaria and increased soft tissue swelling in this locale.    Lab on 06/16/2022   Component Date Value Ref Range Status     Glucose 06/16/2022 88  70 - 99 mg/dL Final     Patient Fasting > 8hrs? 06/16/2022 Yes   Final     Cholesterol 06/16/2022 195  <200 mg/dL Final     Triglycerides 06/16/2022 76  <150 mg/dL Final     Direct Measure HDL 06/16/2022 63  >=50 mg/dL Final     LDL Cholesterol Calculated 06/16/2022 117 (A) <=100 mg/dL Final     Non HDL Cholesterol 06/16/2022 132 (A) <130 mg/dL Final     Patient Fasting > 8hrs? 06/16/2022 Yes   Final     Hemoglobin A1C 06/16/2022 5.3  0.0 - 5.6 % Final    Normal <5.7%   Prediabetes 5.7-6.4%    Diabetes 6.5% or higher     Note: Adopted from ADA consensus guidelines.                   .  ..  "

## 2022-08-23 NOTE — PATIENT INSTRUCTIONS
Pam;      Multiple regions of urticaria are very likely related to insect bites.  I suspect biting flies versus gnats.  I will prescribe any stronger antihistamine.  I will also prescribe a short course of an anti-inflammatory called prednisone.    Additionally I will give you a few days of an antibiotic to make certain that he did not get a secondary infection.    A prescription for an EpiPen and a referral to allergy also have been made today.    Best regards  Nigel after visit summary

## 2022-09-01 ENCOUNTER — OFFICE VISIT (OUTPATIENT)
Dept: OBGYN | Facility: CLINIC | Age: 27
End: 2022-09-01
Payer: COMMERCIAL

## 2022-09-01 VITALS
BODY MASS INDEX: 20.6 KG/M2 | HEART RATE: 76 BPM | SYSTOLIC BLOOD PRESSURE: 104 MMHG | WEIGHT: 109 LBS | DIASTOLIC BLOOD PRESSURE: 62 MMHG

## 2022-09-01 DIAGNOSIS — N93.0 PCB (POST COITAL BLEEDING): ICD-10-CM

## 2022-09-01 DIAGNOSIS — Z30.015 ENCOUNTER FOR INITIAL PRESCRIPTION OF VAGINAL RING HORMONAL CONTRACEPTIVE: Primary | ICD-10-CM

## 2022-09-01 DIAGNOSIS — Z30.09 CONTRACEPTIVE EDUCATION: ICD-10-CM

## 2022-09-01 DIAGNOSIS — R87.612 LGSIL ON PAP SMEAR OF CERVIX: ICD-10-CM

## 2022-09-01 PROCEDURE — 99215 OFFICE O/P EST HI 40 MIN: CPT | Performed by: OBSTETRICS & GYNECOLOGY

## 2022-09-01 RX ORDER — ETONOGESTREL AND ETHINYL ESTRADIOL VAGINAL RING .015; .12 MG/D; MG/D
1 RING VAGINAL
Qty: 3 EACH | Refills: 3 | Status: SHIPPED | OUTPATIENT
Start: 2022-09-01 | End: 2023-02-23 | Stop reason: SINTOL

## 2022-09-08 ENCOUNTER — TELEPHONE (OUTPATIENT)
Dept: GASTROENTEROLOGY | Facility: CLINIC | Age: 27
End: 2022-09-08

## 2022-09-08 DIAGNOSIS — K59.00 CONSTIPATION: Primary | ICD-10-CM

## 2022-09-08 RX ORDER — BISACODYL 5 MG
1 TABLET, DELAYED RELEASE (ENTERIC COATED) ORAL SEE ADMIN INSTRUCTIONS
Qty: 4 TABLET | Refills: 0 | Status: SHIPPED | OUTPATIENT
Start: 2022-09-08 | End: 2023-02-23

## 2022-09-08 NOTE — TELEPHONE ENCOUNTER
Patient scheduled for colonoscopy/EGD on 9.15.22.     Covid test scheduled ?. Discuss at home test option.     Pre op exam scheduled: ?    Arrival time: 0745    Facility location: [facility]    Sedation type: MAC    Indication for procedure: GERD, H pylori, constipation    Anticoagulations? no     Bowel prep recommendation: Extended golytely d/t constipation    Extended prep sent to [facility]. Prep instructions sent via Slice    Pre visit planning completed.    Oksana Mosquera RN

## 2022-09-08 NOTE — TELEPHONE ENCOUNTER
Attempted to contact patient regarding upcoming colonoscopy/EGD procedure on 9.15.22 for pre assessment questions. No answer.     Left message to return call to [phone] #[phone]    Oksana Mosquera RN

## 2022-09-12 NOTE — TELEPHONE ENCOUNTER
2nd attempt    Attempted to contact patient regarding upcoming colonoscopy procedure on 9.15.22 for pre assessment questions. No answer.     Left message to return call to [phone] #4    MyChart message sent    Oksana Mosquera RN

## 2022-09-12 NOTE — TELEPHONE ENCOUNTER
Pre assessment questions completed for upcoming Colonoscopy procedure scheduled on 9/15/22    COVID policy reviewed. Patient to complete rapid antigen test one to two days before their scheduled procedure. Patient to bring photo of the results when they come in for their procedure.    Reviewed procedural arrival time 0745 and facility location [facility].    Designated  policy reviewed. Instructed to have someone stay 24 hours post procedure.     Reviewed Colonoscopy/EGD prep instructions. No fiber/iron supplements or foods that contain nuts/seeds 7 days prior to procedure.     Anticoagulation/blood thinners? None    Electronic implanted devices? None    Patient verbalized understanding and had no questions or concerns at this time.    Alaina Villalobos RN

## 2022-09-13 ENCOUNTER — OFFICE VISIT (OUTPATIENT)
Dept: FAMILY MEDICINE | Facility: CLINIC | Age: 27
End: 2022-09-13
Payer: COMMERCIAL

## 2022-09-13 VITALS
WEIGHT: 109 LBS | HEART RATE: 69 BPM | SYSTOLIC BLOOD PRESSURE: 113 MMHG | TEMPERATURE: 99.1 F | DIASTOLIC BLOOD PRESSURE: 61 MMHG | HEIGHT: 61 IN | BODY MASS INDEX: 20.58 KG/M2 | OXYGEN SATURATION: 98 %

## 2022-09-13 DIAGNOSIS — R13.19 ESOPHAGEAL DYSPHAGIA: ICD-10-CM

## 2022-09-13 DIAGNOSIS — Z86.0100 HISTORY OF COLONIC POLYPS: ICD-10-CM

## 2022-09-13 DIAGNOSIS — Z01.818 PREOP GENERAL PHYSICAL EXAM: Primary | ICD-10-CM

## 2022-09-13 LAB
ERYTHROCYTE [DISTWIDTH] IN BLOOD BY AUTOMATED COUNT: 12.7 % (ref 10–15)
HCG UR QL: NEGATIVE
HCT VFR BLD AUTO: 39.4 % (ref 35–47)
HGB BLD-MCNC: 13.6 G/DL (ref 11.7–15.7)
MCH RBC QN AUTO: 32.3 PG (ref 26.5–33)
MCHC RBC AUTO-ENTMCNC: 34.5 G/DL (ref 31.5–36.5)
MCV RBC AUTO: 94 FL (ref 78–100)
PLATELET # BLD AUTO: 339 10E3/UL (ref 150–450)
RBC # BLD AUTO: 4.21 10E6/UL (ref 3.8–5.2)
WBC # BLD AUTO: 9.1 10E3/UL (ref 4–11)

## 2022-09-13 PROCEDURE — 81025 URINE PREGNANCY TEST: CPT | Performed by: FAMILY MEDICINE

## 2022-09-13 PROCEDURE — 85027 COMPLETE CBC AUTOMATED: CPT | Performed by: FAMILY MEDICINE

## 2022-09-13 PROCEDURE — 99214 OFFICE O/P EST MOD 30 MIN: CPT | Performed by: FAMILY MEDICINE

## 2022-09-13 PROCEDURE — 36415 COLL VENOUS BLD VENIPUNCTURE: CPT | Performed by: FAMILY MEDICINE

## 2022-09-13 ASSESSMENT — PAIN SCALES - GENERAL: PAINLEVEL: NO PAIN (0)

## 2022-09-13 NOTE — PROGRESS NOTES
[facility]  [address] [place], [place] [address]E SO  [address]  [facility] [address]  Phone: [phone]  Fax: [phone]  Primary Provider: [facility]  Pre-op Performing Provider: OKSANA SILVERIO      PREOPERATIVE EVALUATION:  Today's date: 9/13/2022    Pam Thompson is a 26 year old female who presents for a preoperative evaluation.    Surgical Information:  Surgery/Procedure: Esophagogastrodudenoscopy  Surgery Location: [facility]  Surgeon: Amandeep Maurice  Surgery Date: 9-  Time of Surgery: 9:00AM  Where patient plans to recover: At home with family  Fax number for surgical facility: Note does not need to be faxed, will be available electronically in Epic.    Type of Anesthesia Anticipated: to be determined    Assessment & Plan     The proposed surgical procedure is considered LOW risk.    Preop general physical exam  In good overall health  OK for procedure  Using condom for birth control, will check HCG  - CBC with platelets  - HCG qualitative urine  - CBC with platelets    Esophageal dysphagia  Worse  Follow up with consultant as planned.     History of colonic polyps  Needs colonoscopy           Risks and Recommendations:  The patient has the following additional risks and recommendations for perioperative complications:   - No identified additional risk factors other than previously addressed        RECOMMENDATION:  APPROVAL GIVEN to proceed with proposed procedure, without further diagnostic evaluation.    Review of external notes as documented above           12 minutes spent on the date of the encounter doing review of outside records and review of test results           Subjective     HPI related to upcoming procedure:   Encounter Diagnoses   Name Primary?     Preop general physical exam Yes     Esophageal dysphagia      History of colonic polyps          Preop Questions 9/13/2022   1. Have you  ever had a heart attack or stroke? No   2. Have you ever had surgery on your heart or blood vessels, such as a stent placement, a coronary artery bypass, or surgery on an artery in your head, neck, heart, or legs? No   3. Do you have chest pain with activity? No   4. Do you have a history of  heart failure? No   5. Do you currently have a cold, bronchitis or symptoms of other infection? No   6. Do you have a cough, shortness of breath, or wheezing? No   7. Do you or anyone in your family have previous history of blood clots? No   8. Do you or does anyone in your family have a serious bleeding problem such as prolonged bleeding following surgeries or cuts? No   9. Have you ever had problems with anemia or been told to take iron pills? No   10. Have you had any abnormal blood loss such as black, tarry or bloody stools, or abnormal vaginal bleeding? No   11. Have you ever had a blood transfusion? No   12. Are you willing to have a blood transfusion if it is medically needed before, during, or after your surgery? Yes   13. Have you or any of your relatives ever had problems with anesthesia? No   14. Do you have sleep apnea, excessive snoring or daytime drowsiness? No   15. Do you have any artifical heart valves or other implanted medical devices like a pacemaker, defibrillator, or continuous glucose monitor? No   16. Do you have artificial joints? No   17. Are you allergic to latex? No   18. Is there any chance that you may be pregnant? No       Health Care Directive:  Patient does not have a Health Care Directive or Living Will:     Preoperative Review of :   reviewed - no record of controlled substances prescribed.          Review of Systems  CONSTITUTIONAL: NEGATIVE for fever, chills, change in weight  INTEGUMENTARY/SKIN: NEGATIVE for worrisome rashes, moles or lesions  EYES: NEGATIVE for vision changes or irritation  ENT/MOUTH: POSITIVE for dysphagia  RESP: NEGATIVE for significant cough or SOB  CV: NEGATIVE  for chest pain, palpitations or peripheral edema  : NEGATIVE for frequency, dysuria, or hematuria  MUSCULOSKELETAL: NEGATIVE for significant arthralgias or myalgia  NEURO: NEGATIVE for weakness, dizziness or paresthesias  ENDOCRINE: NEGATIVE for temperature intolerance, skin/hair changes  HEME: NEGATIVE for bleeding problems  PSYCHIATRIC: NEGATIVE for changes in mood or affect    Patient Active Problem List    Diagnosis Date Noted     Papanicolaou smear of cervix with low grade squamous intraepithelial lesion (LGSIL) 2022     Priority: Medium     2019 NIL pap  22 LSIL pap. Plan colp due bef 22 [place] Bx's & ECC: benign. Plan 1 year cotest       Gastroesophageal reflux disease, unspecified whether esophagitis present 2021     Priority: Medium     History of Helicobacter pylori infection 2021     Priority: Medium     treated 8 yrs ago         Past Medical History:   Diagnosis Date     History of colposcopy 2022     Migraine      Past Surgical History:   Procedure Laterality Date     APPENDECTOMY        SECTION N/A 10/27/2020    Procedure:  SECTION;  Surgeon: Janet De La Garza MD;  Location:  L+D     Current Outpatient Medications   Medication Sig Dispense Refill     bisacodyl (DULCOLAX) 5 MG EC tablet Take 1 tablet (5 mg) by mouth See Admin Instructions --2 days prior to procedure, take two (2) tablets at 4 pm.  1 day prior to procedure, take two (2) tablets at 4 pm.  For additional instructions refer to you colonoscopy prep instructions. 4 tablet 0     cyclobenzaprine (FLEXERIL) 10 MG tablet Take 1 tablet (10 mg) by mouth 3 times daily as needed for muscle spasms (Patient not taking: Reported on 2022) 90 tablet 0     doxycycline hyclate (VIBRAMYCIN) 100 MG capsule Take 1 capsule (100 mg) by mouth 2 times daily (Patient not taking: Reported on 2022) 6 capsule 0     EPINEPHrine (EPIPEN 2-RICO) 0.3 MG/0.3ML injection 2-pack Inject 0.3 mLs (0.3 mg) into  the muscle as needed for anaphylaxis May repeat one time in 5-15 minutes if response to initial dose is inadequate. 2 each 1     etonogestrel-ethinyl estradiol (NUVARING) 0.12-0.015 MG/24HR vaginal ring Place 1 each vaginally every 28 days 3 each 3     hydrOXYzine (ATARAX) 25 MG tablet Take 1 tablet (25 mg) by mouth every 6 hours as needed for itching (Patient not taking: Reported on 9/1/2022) 16 tablet 0     linaclotide (LINZESS) 145 MCG capsule Take 1 capsule (145 mcg) by mouth every morning (before breakfast) (Patient not taking: Reported on 9/1/2022) 30 capsule 2     polyethylene glycol (GOLYTELY) 236 g suspension Take 6,000 mLs by mouth See Admin Instructions --For instructions refer to you colonoscopy prep instructions. 8000 mL 0     predniSONE (DELTASONE) 20 MG tablet Take 1 tablet (20 mg) by mouth daily (Patient not taking: Reported on 9/1/2022) 3 tablet 0       Allergies   Allergen Reactions     Watermelon Flavor Anaphylaxis     Lip and face swelling.     Bananas [Banana] Swelling     Swollen lips and tongue     Cefazolin Itching     Cephalosporins      Pineapple      Swollen lips and tongue        Social History     Tobacco Use     Smoking status: Never Smoker     Smokeless tobacco: Never Used   Substance Use Topics     Alcohol use: Not Currently       History   Drug Use Unknown         Objective     LMP 08/27/2022 (Exact Date)     Physical Exam    GENERAL APPEARANCE: healthy, alert and no distress     EYES: EOMI, PERRL     HENT: ear canals and TM's normal and nose and mouth without ulcers or lesions     NECK: no adenopathy, no asymmetry, masses, or scars and thyroid normal to palpation     RESP: lungs clear to auscultation - no rales, rhonchi or wheezes     CV: regular rates and rhythm, normal S1 S2, no S3 or S4 and no murmur, click or rub     ABDOMEN:  soft, nontender, no HSM or masses and bowel sounds normal     MS: extremities normal- no gross deformities noted, no evidence of inflammation in joints,  FROM in all extremities.     SKIN: no suspicious lesions or rashes     NEURO: Normal strength and tone, sensory exam grossly normal, mentation intact and speech normal     PSYCH: mentation appears normal. and affect normal/bright     LYMPHATICS: No cervical adenopathy    Recent Labs   Lab Test 06/16/22  0842 12/24/21  1150 11/17/21  0821 07/29/21  1755   HGB  --  13.8 13.2  --    PLT  --  373 303  --    NA  --  139 139 140   POTASSIUM  --  3.9 3.7 3.8   CR  --  0.58 0.54 0.64   A1C 5.3  --   --  5.3        Diagnostics:  Labs pending at this time.  Results will be reviewed when available.  Recent Results (from the past 720 hour(s))   CBC with platelets    Collection Time: 09/13/22  4:52 PM   Result Value Ref Range    WBC Count 9.1 4.0 - 11.0 10e3/uL    RBC Count 4.21 3.80 - 5.20 10e6/uL    Hemoglobin 13.6 11.7 - 15.7 g/dL    Hematocrit 39.4 35.0 - 47.0 %    MCV 94 78 - 100 fL    MCH 32.3 26.5 - 33.0 pg    MCHC 34.5 31.5 - 36.5 g/dL    RDW 12.7 10.0 - 15.0 %    Platelet Count 339 150 - 450 10e3/uL      No EKG required for low risk surgery (cataract, skin procedure, breast biopsy, etc).    Revised Cardiac Risk Index (RCRI):  The patient has the following serious cardiovascular risks for perioperative complications:   - No serious cardiac risks = 0 points     RCRI Interpretation: 0 points: Class I (very low risk - 0.4% complication rate)           Signed Electronically by: Manuel Alexandre MD  Copy of this evaluation report is provided to requesting physician.

## 2022-09-14 NOTE — TELEPHONE ENCOUNTER
The Pt called in with some prep questions, all of which this RN answered. Nothing further needed at this time.     Alaina Villalobos RN   -[facility]

## 2022-09-15 ENCOUNTER — ANESTHESIA (OUTPATIENT)
Dept: GASTROENTEROLOGY | Facility: CLINIC | Age: 27
End: 2022-09-15
Payer: COMMERCIAL

## 2022-09-15 ENCOUNTER — HOSPITAL ENCOUNTER (OUTPATIENT)
Facility: CLINIC | Age: 27
Discharge: HOME | End: 2022-09-15
Attending: INTERNAL MEDICINE | Admitting: INTERNAL MEDICINE
Payer: COMMERCIAL

## 2022-09-15 ENCOUNTER — ANESTHESIA EVENT (OUTPATIENT)
Dept: GASTROENTEROLOGY | Facility: CLINIC | Age: 27
End: 2022-09-15
Payer: COMMERCIAL

## 2022-09-15 VITALS
HEART RATE: 79 BPM | SYSTOLIC BLOOD PRESSURE: 112 MMHG | HEIGHT: 61 IN | WEIGHT: 106.26 LBS | TEMPERATURE: 98.6 F | BODY MASS INDEX: 20.06 KG/M2 | RESPIRATION RATE: 16 BRPM | OXYGEN SATURATION: 100 % | DIASTOLIC BLOOD PRESSURE: 61 MMHG

## 2022-09-15 LAB
COLONOSCOPY: NORMAL
UPPER GI ENDOSCOPY: NORMAL

## 2022-09-15 PROCEDURE — 88342 IMHCHEM/IMCYTCHM 1ST ANTB: CPT | Mod: 26 | Performed by: PATHOLOGY

## 2022-09-15 PROCEDURE — 370N000017 HC ANESTHESIA TECHNICAL FEE, PER MIN: Performed by: INTERNAL MEDICINE

## 2022-09-15 PROCEDURE — 258N000003 HC RX IP 258 OP 636: Performed by: NURSE ANESTHETIST, CERTIFIED REGISTERED

## 2022-09-15 PROCEDURE — 45378 DIAGNOSTIC COLONOSCOPY: CPT | Performed by: INTERNAL MEDICINE

## 2022-09-15 PROCEDURE — 88305 TISSUE EXAM BY PATHOLOGIST: CPT | Mod: TC | Performed by: INTERNAL MEDICINE

## 2022-09-15 PROCEDURE — 250N000011 HC RX IP 250 OP 636: Performed by: NURSE ANESTHETIST, CERTIFIED REGISTERED

## 2022-09-15 PROCEDURE — 88305 TISSUE EXAM BY PATHOLOGIST: CPT | Mod: 26 | Performed by: PATHOLOGY

## 2022-09-15 PROCEDURE — 250N000009 HC RX 250: Performed by: NURSE ANESTHETIST, CERTIFIED REGISTERED

## 2022-09-15 PROCEDURE — 43239 EGD BIOPSY SINGLE/MULTIPLE: CPT | Performed by: INTERNAL MEDICINE

## 2022-09-15 RX ORDER — NALOXONE HYDROCHLORIDE 0.4 MG/ML
0.4 INJECTION, SOLUTION INTRAMUSCULAR; INTRAVENOUS; SUBCUTANEOUS
Status: DISCONTINUED | OUTPATIENT
Start: 2022-09-15 | End: 2022-09-20 | Stop reason: HOSPADM

## 2022-09-15 RX ORDER — ONDANSETRON 4 MG/1
4 TABLET, ORALLY DISINTEGRATING ORAL EVERY 6 HOURS PRN
Status: DISCONTINUED | OUTPATIENT
Start: 2022-09-15 | End: 2022-09-20 | Stop reason: HOSPADM

## 2022-09-15 RX ORDER — ONDANSETRON 2 MG/ML
4 INJECTION INTRAMUSCULAR; INTRAVENOUS EVERY 6 HOURS PRN
Status: DISCONTINUED | OUTPATIENT
Start: 2022-09-15 | End: 2022-09-20 | Stop reason: HOSPADM

## 2022-09-15 RX ORDER — PROPOFOL 10 MG/ML
INJECTION, EMULSION INTRAVENOUS CONTINUOUS PRN
Status: DISCONTINUED | OUTPATIENT
Start: 2022-09-15 | End: 2022-09-15

## 2022-09-15 RX ORDER — OXYCODONE HYDROCHLORIDE 5 MG/1
5 TABLET ORAL EVERY 4 HOURS PRN
Status: DISCONTINUED | OUTPATIENT
Start: 2022-09-15 | End: 2022-09-20 | Stop reason: HOSPADM

## 2022-09-15 RX ORDER — FLUMAZENIL 0.1 MG/ML
0.2 INJECTION, SOLUTION INTRAVENOUS
Status: ACTIVE | OUTPATIENT
Start: 2022-09-15 | End: 2022-09-15

## 2022-09-15 RX ORDER — ONDANSETRON 4 MG/1
4 TABLET, ORALLY DISINTEGRATING ORAL EVERY 30 MIN PRN
Status: DISCONTINUED | OUTPATIENT
Start: 2022-09-15 | End: 2022-09-20 | Stop reason: HOSPADM

## 2022-09-15 RX ORDER — FENTANYL CITRATE 50 UG/ML
25 INJECTION, SOLUTION INTRAMUSCULAR; INTRAVENOUS
Status: DISCONTINUED | OUTPATIENT
Start: 2022-09-15 | End: 2022-09-20 | Stop reason: HOSPADM

## 2022-09-15 RX ORDER — FENTANYL CITRATE 50 UG/ML
25 INJECTION, SOLUTION INTRAMUSCULAR; INTRAVENOUS EVERY 5 MIN PRN
Status: DISCONTINUED | OUTPATIENT
Start: 2022-09-15 | End: 2022-09-20 | Stop reason: HOSPADM

## 2022-09-15 RX ORDER — LIDOCAINE 40 MG/G
CREAM TOPICAL
Status: DISCONTINUED | OUTPATIENT
Start: 2022-09-15 | End: 2022-09-15 | Stop reason: HOSPADM

## 2022-09-15 RX ORDER — PROCHLORPERAZINE MALEATE 10 MG
10 TABLET ORAL EVERY 6 HOURS PRN
Status: DISCONTINUED | OUTPATIENT
Start: 2022-09-15 | End: 2022-09-20 | Stop reason: HOSPADM

## 2022-09-15 RX ORDER — SODIUM CHLORIDE, SODIUM LACTATE, POTASSIUM CHLORIDE, CALCIUM CHLORIDE 600; 310; 30; 20 MG/100ML; MG/100ML; MG/100ML; MG/100ML
INJECTION, SOLUTION INTRAVENOUS CONTINUOUS
Status: DISCONTINUED | OUTPATIENT
Start: 2022-09-15 | End: 2022-09-20 | Stop reason: HOSPADM

## 2022-09-15 RX ORDER — MEPERIDINE HYDROCHLORIDE 25 MG/ML
12.5 INJECTION INTRAMUSCULAR; INTRAVENOUS; SUBCUTANEOUS
Status: DISCONTINUED | OUTPATIENT
Start: 2022-09-15 | End: 2022-09-20 | Stop reason: HOSPADM

## 2022-09-15 RX ORDER — SODIUM CHLORIDE, SODIUM LACTATE, POTASSIUM CHLORIDE, CALCIUM CHLORIDE 600; 310; 30; 20 MG/100ML; MG/100ML; MG/100ML; MG/100ML
INJECTION, SOLUTION INTRAVENOUS CONTINUOUS
Status: DISCONTINUED | OUTPATIENT
Start: 2022-09-15 | End: 2022-09-15 | Stop reason: HOSPADM

## 2022-09-15 RX ORDER — NALOXONE HYDROCHLORIDE 0.4 MG/ML
0.2 INJECTION, SOLUTION INTRAMUSCULAR; INTRAVENOUS; SUBCUTANEOUS
Status: DISCONTINUED | OUTPATIENT
Start: 2022-09-15 | End: 2022-09-20 | Stop reason: HOSPADM

## 2022-09-15 RX ORDER — HYDROMORPHONE HYDROCHLORIDE 1 MG/ML
0.2 INJECTION, SOLUTION INTRAMUSCULAR; INTRAVENOUS; SUBCUTANEOUS EVERY 5 MIN PRN
Status: DISCONTINUED | OUTPATIENT
Start: 2022-09-15 | End: 2022-09-20 | Stop reason: HOSPADM

## 2022-09-15 RX ORDER — LIDOCAINE HYDROCHLORIDE 20 MG/ML
INJECTION, SOLUTION INFILTRATION; PERINEURAL PRN
Status: DISCONTINUED | OUTPATIENT
Start: 2022-09-15 | End: 2022-09-15

## 2022-09-15 RX ORDER — ONDANSETRON 2 MG/ML
4 INJECTION INTRAMUSCULAR; INTRAVENOUS EVERY 30 MIN PRN
Status: DISCONTINUED | OUTPATIENT
Start: 2022-09-15 | End: 2022-09-20 | Stop reason: HOSPADM

## 2022-09-15 RX ORDER — SODIUM CHLORIDE, SODIUM LACTATE, POTASSIUM CHLORIDE, CALCIUM CHLORIDE 600; 310; 30; 20 MG/100ML; MG/100ML; MG/100ML; MG/100ML
INJECTION, SOLUTION INTRAVENOUS CONTINUOUS PRN
Status: DISCONTINUED | OUTPATIENT
Start: 2022-09-15 | End: 2022-09-15

## 2022-09-15 RX ADMIN — LIDOCAINE HYDROCHLORIDE 100 MG: 20 INJECTION, SOLUTION INFILTRATION; PERINEURAL at 08:48

## 2022-09-15 RX ADMIN — PROPOFOL 175 MCG/KG/MIN: 10 INJECTION, EMULSION INTRAVENOUS at 08:48

## 2022-09-15 RX ADMIN — SODIUM CHLORIDE, POTASSIUM CHLORIDE, SODIUM LACTATE AND CALCIUM CHLORIDE: 600; 310; 30; 20 INJECTION, SOLUTION INTRAVENOUS at 08:47

## 2022-09-15 RX ADMIN — MIDAZOLAM 2 MG: 1 INJECTION INTRAMUSCULAR; INTRAVENOUS at 08:43

## 2022-09-15 ASSESSMENT — ACTIVITIES OF DAILY LIVING (ADL)
ADLS_ACUITY_SCORE: 35

## 2022-09-15 NOTE — ANESTHESIA CARE TRANSFER NOTE
Patient: Pam Thompson    Procedure: Procedure(s):  ESOPHAGOGASTRODUODENOSCOPY, WITH BIOPSY  COLONOSCOPY       Diagnosis: History of Helicobacter pylori infection [Z86.19]  Esophageal dysphagia [R13.19]  Constipation, unspecified constipation type [K59.00]  Diagnosis Additional Information: No value filed.    Anesthesia Type:   MAC     Note:    Oropharynx: oropharynx clear of all foreign objects  Level of Consciousness: awake  Oxygen Supplementation: room air    Independent Airway: airway patency satisfactory and stable  Dentition: dentition unchanged  Vital Signs Stable: post-procedure vital signs reviewed and stable  Report to RN Given: handoff report given  Patient transferred to: Phase II    Handoff Report: Identifed the Patient, Identified the Reponsible Provider, Reviewed the pertinent medical history, Discussed the surgical course, Reviewed Intra-OP anesthesia mangement and issues during anesthesia, Set expectations for post-procedure period and Allowed opportunity for questions and acknowledgement of understanding      Vitals:  Vitals Value Taken Time   /62 09/15/22 0927   Temp     Pulse     Resp     SpO2 100 % 09/15/22 0927   Vitals shown include unvalidated device data.    Electronically Signed By: ROMY Ramos CRNA  September 15, 2022  9:28 AM

## 2022-09-15 NOTE — ANESTHESIA POSTPROCEDURE EVALUATION
Patient: Pam Thompson    Procedure: Procedure(s):  ESOPHAGOGASTRODUODENOSCOPY, WITH BIOPSY  COLONOSCOPY       Anesthesia Type:  MAC    Note:  Disposition: Outpatient   Postop Pain Control: Uneventful            Sign Out: Well controlled pain   PONV: No   Neuro/Psych: Uneventful            Sign Out: Acceptable/Baseline neuro status   Airway/Respiratory: Uneventful            Sign Out: Acceptable/Baseline resp. status   CV/Hemodynamics: Uneventful            Sign Out: Acceptable CV status; No obvious hypovolemia; No obvious fluid overload   Other NRE: NONE   DID A NON-ROUTINE EVENT OCCUR? No           Last vitals:  Vitals Value Taken Time   /61 09/15/22 1015   Temp 37  C (98.6  F) 09/15/22 0927   Pulse 79 09/15/22 1015   Resp 16 09/15/22 1013   SpO2 100 % 09/15/22 1015       Electronically Signed By: Rafal Abdul MD  September 15, 2022  10:38 AM

## 2022-09-15 NOTE — ANESTHESIA PREPROCEDURE EVALUATION
Anesthesia Pre-Procedure Evaluation    Patient: Pam Thompson   MRN: [id number] : 1995        Procedure : Procedure(s):  ESOPHAGOGASTRODUODENOSCOPY (EGD)  COLONOSCOPY          Past Medical History:   Diagnosis Date     History of colposcopy 2022     Migraine       Past Surgical History:   Procedure Laterality Date     APPENDECTOMY        SECTION N/A 10/27/2020    Procedure:  SECTION;  Surgeon: Janet De La Garza MD;  Location:  L+D      Allergies   Allergen Reactions     Watermelon Flavor Anaphylaxis     Lip and face swelling.     Bananas [Banana] Swelling     Swollen lips and tongue     Cefazolin Itching     Cephalosporins      Pineapple      Swollen lips and tongue      Social History     Tobacco Use     Smoking status: Never Smoker     Smokeless tobacco: Never Used   Substance Use Topics     Alcohol use: Not Currently      Wt Readings from Last 1 Encounters:   09/15/22 48.2 kg (106 lb 4.2 oz)        Anesthesia Evaluation   Pt has had prior anesthetic. Type: General.        ROS/MED HX  ENT/Pulmonary:       Neurologic:       Cardiovascular:       METS/Exercise Tolerance:     Hematologic:       Musculoskeletal:       GI/Hepatic:     (+) GERD,     Renal/Genitourinary:       Endo:       Psychiatric/Substance Use:       Infectious Disease:       Malignancy:       Other:            Physical Exam    Airway        Mallampati: II    Neck ROM: full     Respiratory Devices and Support         Dental  no notable dental history         Cardiovascular   cardiovascular exam normal          Pulmonary   pulmonary exam normal                OUTSIDE LABS:  CBC:   Lab Results   Component Value Date    WBC 9.1 2022    WBC 6.7 2021    HGB 13.6 2022    HGB 13.8 2021    HCT 39.4 2022    HCT 41.5 2021     2022     2021     BMP:   Lab Results   Component Value Date     2021     2021    POTASSIUM 3.9 2021     POTASSIUM 3.7 11/17/2021    CHLORIDE 109 12/24/2021    CHLORIDE 109 11/17/2021    CO2 24 12/24/2021    CO2 25 11/17/2021    BUN 16 12/24/2021    BUN 14 11/17/2021    CR 0.58 12/24/2021    CR 0.54 11/17/2021    GLC 88 06/16/2022    GLC 78 12/24/2021     COAGS: No results found for: PTT, INR, FIBR  POC:   Lab Results   Component Value Date    HCG Negative 09/13/2022     HEPATIC:   Lab Results   Component Value Date    ALBUMIN 4.1 12/24/2021    PROTTOTAL 8.2 12/24/2021    ALT 27 12/24/2021    AST 20 12/24/2021    ALKPHOS 65 12/24/2021    BILITOTAL 0.9 12/24/2021     OTHER:   Lab Results   Component Value Date    A1C 5.3 06/16/2022    MARIA D 9.0 12/24/2021    LIPASE 104 12/24/2021    TSH 1.01 10/01/2019       Anesthesia Plan    ASA Status:  1   NPO Status:  NPO Appropriate    Anesthesia Type: MAC.     - Reason for MAC: immobility needed              Consents    Anesthesia Plan(s) and associated risks, benefits, and realistic alternatives discussed. Questions answered and patient/representative(s) expressed understanding.     - Discussed: Risks, Benefits and Alternatives for BOTH SEDATION and the PROCEDURE were discussed     - Discussed with:  Patient         Postoperative Care    Pain management: IV analgesics.   PONV prophylaxis: Ondansetron (or other 5HT-3)     Comments:                Rafal Abdul MD

## 2022-09-15 NOTE — LETTER
September 21, 2022      Bobharinderaugusto Jay  [address]   [facility] [address]        Dear ,    I received the results from the biopsies taken at your endoscopy last week.    There was some mild inflammation on the stomach biopsies but no evidence of h.pylori infection.    I have sent a copy of this letter to your primary care clinic and GI provider.    The pathology results returned from the biopsies taken at your recent endoscopy.    Sincerely,               Amandeep Wynn MD   [facility], [place], ENDOSCOPY  [address] [facility] [address]  Phone: [phone]

## 2022-09-16 ASSESSMENT — ACTIVITIES OF DAILY LIVING (ADL)
ADLS_ACUITY_SCORE: 35

## 2022-09-17 ENCOUNTER — E-VISIT (OUTPATIENT)
Dept: URGENT CARE | Facility: CLINIC | Age: 27
End: 2022-09-17
Payer: COMMERCIAL

## 2022-09-17 DIAGNOSIS — R21 RASH AND NONSPECIFIC SKIN ERUPTION: Primary | ICD-10-CM

## 2022-09-17 PROCEDURE — 99207 PR NON-BILLABLE SERV PER CHARTING: CPT | Performed by: NURSE PRACTITIONER

## 2022-09-17 ASSESSMENT — ACTIVITIES OF DAILY LIVING (ADL)
ADLS_ACUITY_SCORE: 35

## 2022-09-18 ENCOUNTER — E-VISIT (OUTPATIENT)
Dept: URGENT CARE | Facility: URGENT CARE | Age: 27
End: 2022-09-18
Payer: COMMERCIAL

## 2022-09-18 ENCOUNTER — HEALTH MAINTENANCE LETTER (OUTPATIENT)
Age: 27
End: 2022-09-18

## 2022-09-18 DIAGNOSIS — L30.9 FACIAL DERMATITIS: Primary | ICD-10-CM

## 2022-09-18 DIAGNOSIS — L30.9 DERMATITIS: ICD-10-CM

## 2022-09-18 PROCEDURE — 99421 OL DIG E/M SVC 5-10 MIN: CPT | Performed by: INTERNAL MEDICINE

## 2022-09-18 RX ORDER — TRIAMCINOLONE ACETONIDE 1 MG/G
OINTMENT TOPICAL 2 TIMES DAILY
Qty: 30 G | Refills: 0 | Status: SHIPPED | OUTPATIENT
Start: 2022-09-18 | End: 2023-02-23

## 2022-09-18 ASSESSMENT — ACTIVITIES OF DAILY LIVING (ADL)
ADLS_ACUITY_SCORE: 35

## 2022-09-18 NOTE — PATIENT INSTRUCTIONS
Hello    It appears that you have facial dermatitis.  I have chosen to prescribe a slightly stronger steroid cream for you to try twice daily to the area sparingly.  If you are not improving as expected over the next few days, I would like you to be evaluated in person by your primary provider, dermatology or be seen at urgent care.  If you do see improvement continue to use over the next 1 to 2 weeks.    Rhoda Rubio MD

## 2022-09-18 NOTE — PATIENT INSTRUCTIONS
Dear Pam Thompson,    We are sorry you are not feeling well. Based on the responses you provided, it is recommended that you be seen in-person in urgent care so we can better evaluate your symptoms. Please click here to find the nearest urgent care location to you.   You will not be charged for this Visit. Thank you for trusting us with your care.    ROMY Avilez CNP

## 2022-09-19 LAB
PATH REPORT.COMMENTS IMP SPEC: NORMAL
PATH REPORT.FINAL DX SPEC: NORMAL
PATH REPORT.GROSS SPEC: NORMAL
PATH REPORT.MICROSCOPIC SPEC OTHER STN: NORMAL
PATH REPORT.RELEVANT HX SPEC: NORMAL
PHOTO IMAGE: NORMAL

## 2022-09-19 ASSESSMENT — ACTIVITIES OF DAILY LIVING (ADL)
ADLS_ACUITY_SCORE: 35

## 2022-10-12 ENCOUNTER — VIRTUAL VISIT (OUTPATIENT)
Dept: FAMILY MEDICINE | Facility: CLINIC | Age: 27
End: 2022-10-12
Payer: COMMERCIAL

## 2022-10-12 DIAGNOSIS — Z88.9 H/O MULTIPLE ALLERGIES: ICD-10-CM

## 2022-10-12 DIAGNOSIS — U07.1 INFECTION DUE TO 2019 NOVEL CORONAVIRUS: Primary | ICD-10-CM

## 2022-10-12 PROCEDURE — 99213 OFFICE O/P EST LOW 20 MIN: CPT | Mod: CS | Performed by: NURSE PRACTITIONER

## 2022-10-12 NOTE — PATIENT INSTRUCTIONS
Instructions for Patients    Proceed to Urgent Care if your symptoms are worsening.  If you are experiencing shortness of breath go to UC or ED.      What are the symptoms of COVID-19?  Symptoms can include fever, cough, shortness of breath, chills, headache, muscle pain sore throat, fatigue, runny or stuffy nose, and loss of taste and smell. Other less common symptoms include nausea, vomiting, or diarrhea (watery stools).    Know when to call 911. Emergency warning signs include:  Trouble breathing or shortness of breath  Pain or pressure in the chest that doesn't go away  Feeling confused like you haven't felt before, or not being able to wake up  Bluish-colored lips or face    How can I take care of myself?  Get lots of rest. Drink extra fluids (unless a doctor has told you not to).  Take Tylenol (acetaminophen) for fever or pain. If you have liver or kidney problems, ask your family doctor if it's okay to take Tylenol   Adults:   650 mg (two 325 mg pills or tablets) every 4 to 6 hours, or...   1,000 mg (two 500 mg pills or tablets) every 8 hours as needed.  Note: Don't take more than 3,000 mg in one day. Acetaminophen is found in many medicines (both prescribed and over the counter). Read all labels to be sure you don't take too much.  For children, check the Tylenol bottle for the right dose. The dose is based on the child's age or weight.  Take over the counter medicines for your symptoms as needed. Talk to your pharmacist.  If you have other health problems (like cancer, heart failure, an organ transplant, or severe kidney disease): Call your specialty clinic if you don't feel better in the next 2 days.    Where can I get more information?   [facility] COVID-19 Resource Hub: www.[facility].org/covid19/   CDC Quarantine & Isolation: https://www.cdc.gov/coronavirus/2019-ncov/your-health/quarantine-isolation.html   CDC - What to Do If You're Sick:  https://www.cdc.gov/coronavirus/2019-ncov/if-you-are-sick/index.html  [facility] clinical trials (COVID-19 research studies): clinicalaffairs.[facility]/umn-clinical-trials  [place] Department of Health COVID-19 Public Hotline: 1-473.807.1330

## 2022-10-12 NOTE — PROGRESS NOTES
Pam is a 26 year old who is being evaluated via a billable video visit.      How would you like to obtain your AVS? MyChart  If the video visit is dropped, the invitation should be resent by: Text to cell phone: [phone]  Will anyone else be joining your video visit? No   joined        Assessment & Plan     Infection due to 2019 novel coronavirus  Onset of symptoms yesterday,  Previous exposure to smoke   Frequent cough with mucous.  History of allergies   - nirmatrelvir and ritonavir (PAXLOVID) therapy pack; Take 3 tablets by mouth 2 times daily for 5 days (Take 2 Nirmatrelvir tablets and 1 Ritonavir tablet twice daily for 5 days)  Start tonight.  Reviewed using a back up birth control     Review of external notes as documented elsewhere in note  Prescription drug management  25 minutes spent on the date of the encounter doing chart review, review of outside records, review of test results, patient visit and documentation        See Patient Instructions    No follow-ups on file.    ROMY Nieves [facility]   Pam is a 26 year old accompanied by her , presenting for the following health issues:  Covid Concern (Tested positive this morning at the clinic)  Her  is requesting an antibiotic    started Paxlovid yesterday and is feeling better    HPI   Onset yesterday  PCR Positive today  Frequent cough with mucous   Ibuprofen helps fever          COVID-19 Symptom Review  How many days ago did these symptoms start? Yesterday    Are any of the following symptoms significant for you?    New or worsening difficulty breathing? No    Worsening cough? Yes, I am coughing up mucus.    Fever or chills? Yes, I felt feverish or had chills.    Headache: YES    Sore throat: YES    Chest pain: YES    Diarrhea: No    Body aches? YES    What treatments has patient tried? , ibuprofen has helped   Does patient live in a nursing home, group home,  or shelter? No  Does patient have a way to get food/medications during quarantined? Yes, I have a friend or family member who can help me.          Review of Systems   Constitutional, HEENT, cardiovascular, pulmonary, gi and gu systems are negative, except as otherwise noted.      Objective    Vitals - Patient Reported  Pain Score: Worst Pain (10)  Pain Loc: Other - see comment (headache, stomach, chest)      Vitals:  No vitals were obtained today due to virtual visit.    Physical Exam   GENERAL: Healthy, alert and frrequent cough  EYES: Eyes grossly normal to inspection.  No discharge or erythema, or obvious scleral/conjunctival abnormalities.  RESP: frequent cough, coughs with talking No audible wheeze, or visible cyanosis.  No visible retractions or increased work of breathing.    SKIN: Visible skin clear. No significant rash, abnormal pigmentation or lesions.  NEURO: Cranial nerves grossly intact.  Mentation and speech appropriate for age.  PSYCH: Mentation appears normal, affect normal/bright, judgement and insight intact, normal speech and appearance well-groomed.              Video-Visit Details    Video Start Time: 5:30  ( unable to connect earlier)     Type of service:  Video Visit    Video End Time:5:53 PM    Originating Location (pt. Location): Home    Distant Location ([facility]     Platform used for Video Visit: Gin Borrego (Lori) CNP  [facility]  Certificate in Travel Health

## 2022-12-15 ENCOUNTER — OFFICE VISIT (OUTPATIENT)
Dept: OBGYN | Facility: CLINIC | Age: 27
End: 2022-12-15
Payer: COMMERCIAL

## 2022-12-15 VITALS
DIASTOLIC BLOOD PRESSURE: 70 MMHG | SYSTOLIC BLOOD PRESSURE: 115 MMHG | BODY MASS INDEX: 21.2 KG/M2 | OXYGEN SATURATION: 99 % | HEART RATE: 77 BPM | WEIGHT: 112.2 LBS

## 2022-12-15 DIAGNOSIS — R11.0 NAUSEA: ICD-10-CM

## 2022-12-15 DIAGNOSIS — R45.86 MOOD SWINGS: ICD-10-CM

## 2022-12-15 DIAGNOSIS — R63.5 WEIGHT GAIN: ICD-10-CM

## 2022-12-15 DIAGNOSIS — R53.83 FATIGUE, UNSPECIFIED TYPE: ICD-10-CM

## 2022-12-15 DIAGNOSIS — N94.10 DYSPAREUNIA, FEMALE: Primary | ICD-10-CM

## 2022-12-15 LAB
ERYTHROCYTE [DISTWIDTH] IN BLOOD BY AUTOMATED COUNT: 12.3 % (ref 10–15)
HCG UR QL: NEGATIVE
HCT VFR BLD AUTO: 39.4 % (ref 35–47)
HGB BLD-MCNC: 13.4 G/DL (ref 11.7–15.7)
MCH RBC QN AUTO: 31.9 PG (ref 26.5–33)
MCHC RBC AUTO-ENTMCNC: 34 G/DL (ref 31.5–36.5)
MCV RBC AUTO: 94 FL (ref 78–100)
PLATELET # BLD AUTO: 352 10E3/UL (ref 150–450)
RBC # BLD AUTO: 4.2 10E6/UL (ref 3.8–5.2)
WBC # BLD AUTO: 7 10E3/UL (ref 4–11)

## 2022-12-15 PROCEDURE — 85027 COMPLETE CBC AUTOMATED: CPT | Performed by: OBSTETRICS & GYNECOLOGY

## 2022-12-15 PROCEDURE — 99213 OFFICE O/P EST LOW 20 MIN: CPT | Performed by: OBSTETRICS & GYNECOLOGY

## 2022-12-15 PROCEDURE — 81025 URINE PREGNANCY TEST: CPT | Performed by: OBSTETRICS & GYNECOLOGY

## 2022-12-15 PROCEDURE — 36415 COLL VENOUS BLD VENIPUNCTURE: CPT | Performed by: OBSTETRICS & GYNECOLOGY

## 2022-12-15 NOTE — PROGRESS NOTES
Pam is a 27 year old female, .  She presents today for contraception surveillance.  She has been using the Nuva Ring, but she isn't sure she wants to continue with it.  She has noticed an increase in her appetite and she feels she has gained weight at least in her abdomen, but she has also had mood swings and uncomfortable sensation with intercourse.  She has taken it out for intercourse, but one time she forgot to put it back in, until the morning.   She has had both vaginal discharge and vaginal dryness.       She has had some nausea and fatigue and she is wondering if she might have pregnancy.  She left the nuvaring out for a few hours after sex and put it back in in the morning.       Prior labs reviewed.   Component      Latest Ref Rng & Units 2021   WBC      4.0 - 11.0 10e3/uL 6.7 9.1   RBC Count      3.80 - 5.20 10e6/uL 4.31 4.21   Hemoglobin      11.7 - 15.7 g/dL 13.8 13.6   Hematocrit      35.0 - 47.0 % 41.5 39.4   Platelet Count      150 - 450 10e3/uL 373 339         Past Medical History:   Diagnosis Date     History of colposcopy 2022     Migraine        Past Surgical History:   Procedure Laterality Date     APPENDECTOMY        SECTION N/A 10/27/2020    Procedure:  SECTION;  Surgeon: Janet De La Garza MD;  Location:  L[facility]D     COLONOSCOPY N/A 9/15/2022    Procedure: COLONOSCOPY;  Surgeon: Amandeep Maurice MD;  Location:  GI     ESOPHAGOSCOPY, GASTROSCOPY, DUODENOSCOPY (EGD), COMBINED N/A 9/15/2022    Procedure: ESOPHAGOGASTRODUODENOSCOPY, WITH BIOPSY;  Surgeon: Amandeep Maurice MD;  Location:  GI          Allergies   Allergen Reactions     Watermelon Flavor Anaphylaxis     Lip and face swelling.     Bananas [Banana] Swelling     Swollen lips and tongue     Cefazolin Itching     Cephalosporins      Pineapple      Swollen lips and tongue       Current Outpatient Medications   Medication Sig Dispense Refill     EPINEPHrine (EPIPEN 2-RICO) 0.3  MG/0.3ML injection 2-pack Inject 0.3 mLs (0.3 mg) into the muscle as needed for anaphylaxis May repeat one time in 5-15 minutes if response to initial dose is inadequate. 2 each 1     etonogestrel-ethinyl estradiol (NUVARING) 0.12-0.015 MG/24HR vaginal ring Place 1 each vaginally every 28 days 3 each 3     triamcinolone (KENALOG) 0.1 % external ointment Apply topically 2 times daily 30 g 0     bisacodyl (DULCOLAX) 5 MG EC tablet Take 1 tablet (5 mg) by mouth See Admin Instructions --2 days prior to procedure, take two (2) tablets at 4 pm.  1 day prior to procedure, take two (2) tablets at 4 pm.  For additional instructions refer to you colonoscopy prep instructions. (Patient not taking: Reported on 10/12/2022) 4 tablet 0       Social History     Socioeconomic History     Marital status:      Spouse name: Not on file     Number of children: Not on file     Years of education: Not on file     Highest education level: Not on file   Occupational History     Not on file   Tobacco Use     Smoking status: Never     Smokeless tobacco: Never   Vaping Use     Vaping Use: Never used   Substance and Sexual Activity     Alcohol use: Not Currently     Drug use: Never     Sexual activity: Yes     Partners: Male     Birth control/protection: Condom   Other Topics Concern     Parent/sibling w/ CABG, MI or angioplasty before 65F 55M? No   Social History Narrative     Not on file     Social Determinants of Health     Financial Resource Strain: Not on file   Food Insecurity: Not on file   Transportation Needs: Not on file   Physical Activity: Not on file   Stress: Not on file   Social Connections: Not on file   Intimate Partner Violence: Not on file   Housing Stability: Not on file       Family History   Problem Relation Age of Onset     Diabetes Father        Review of Systems:  10 point ROS of systems including Constitutional, Eyes, Respiratory, Cardiovascular, Gastroenterology, Genitourinary, Integumentary, Muscularskeletal,  Psychiatric were all negative except for pertinent positives noted in my HPI and in the PMH.      Exam  /70 (BP Location: Right arm, Cuff Size: Adult Regular)   Pulse 77   Wt 50.9 kg (112 lb 3.2 oz)   LMP 11/21/2022 (Exact Date)   SpO2 99%   BMI 21.20 kg/m    General:  WNWD female, NAD  Alert  Oriented x 3  Gait:  Normal  Skin:  Normal skin turgor  HEENT:  NC/AT, EOMI  Abdomen:  Non-tender, non-distended.  Pelvic exam:  Not performed  Extremities:  No clubbing, no cyanosis and no edema.      Labs ordered and reviewed today.   Component      Latest Ref Rng & Units 12/15/2022   WBC      4.0 - 11.0 10e3/uL 7.0   RBC Count      3.80 - 5.20 10e6/uL 4.20   Hemoglobin      11.7 - 15.7 g/dL 13.4   Hematocrit      35.0 - 47.0 % 39.4   Platelet Count      150 - 450 10e3/uL 352   HCG Qual Urine      Negative Negative         Assessment  Dyspareunia   Mood swings  Weight gain   Fatigue  Nausea       Plan  UPT and CBC ordered.   I reviewed the chart and from the last visit with me, she has had a 3 # weight gain.  She is concerned about the excess in the abdominal girth/panus.  This might be something she may consult plastic surgery about.  She then voices the concern about the keloid formation that has occurred.  This is something that may be addressed with the surgeon and an alternative suture may be able to be used.    We discussed contraceptive options and she is interested in non-hormonal contraception.  We discussed these options and she feels that she is interested in the ParaGard IUD.   I have reviewed the ParaGard information with her and the goals and limitations and the side effects.   She does not want this today.  She is going to [facility] this weekend for a week. She is scheduled to take the NuvaRing out today.  She has a couple more NuvaRings at home and she may use these in a continuous fashion, changing it every 3 weeks.  Breakthrough bleeding however, does occur with continuous fashion, but because she will  use it for short term, breakthrough bleeding may not occur.   Questions seem to be answered.     Romero Francisco MD

## 2023-02-23 ENCOUNTER — VIRTUAL VISIT (OUTPATIENT)
Dept: FAMILY MEDICINE | Facility: CLINIC | Age: 28
End: 2023-02-23
Payer: COMMERCIAL

## 2023-02-23 DIAGNOSIS — J03.90 TONSILLITIS: ICD-10-CM

## 2023-02-23 DIAGNOSIS — J02.9 SORE THROAT: Primary | ICD-10-CM

## 2023-02-23 DIAGNOSIS — M25.50 MULTIPLE JOINT PAIN: ICD-10-CM

## 2023-02-23 PROCEDURE — 99213 OFFICE O/P EST LOW 20 MIN: CPT | Mod: VID | Performed by: FAMILY MEDICINE

## 2023-02-23 NOTE — PATIENT INSTRUCTIONS
Salt water gargles may help to soothe the symptoms and help to treat the tonsil stones and prevent future ones.  With the symptoms, generally    Begin Augmentin - take until gone.  IF symptoms worsen or fail to improve, follow up is needed in the next 3-7 days.    I do have orders in for testing for you for the joint pain - if the only area is the right elbow to pinky finger, this is unlikely to be a rheumatologic cause or related to the current infection. The inflammation testing may be elevated due to current illness or I just have the ASO titer ordered for you.  I would recommend an in person visit to evaluate the arm in a few days if symptoms persist.  Avoid direct pressure to the elbow.

## 2023-02-23 NOTE — PROGRESS NOTES
Pam is a 27 year old who is being evaluated via a billable video visit.      How would you like to obtain your AVS? MyChart  If the video visit is dropped, the invitation should be resent by: Text to cell phone: [phone]  Will anyone else be joining your video visit? No        Assessment & Plan     Sore throat  Recommended strep testing.  Will try to get in for this but weather may prohibit that today.    - Streptococcus A Rapid Screen w/Reflex to PCR - Clinic Collect; Future    Tonsillitis  Photo suggests significant tonsillitis appearance.  script for Augmentin given.  Symptomatic treatment recommended.    - amoxicillin-clavulanate (AUGMENTIN) 875-125 MG tablet; Take 1 tablet by mouth 2 times daily    Multiple joint pain  likely related to elbow for the right forearm pain.  Patient and  concerned about under treated strep and requesting blood testing - ASO testing.    - Streptolysin O Antibody (ASO); Future    Assessment requiring an independent historian(s) - family -   Ordering of each unique test  Prescription drug management         Patient Instructions   Salt water gargles may help to soothe the symptoms and help to treat the tonsil stones and prevent future ones.  With the symptoms, generally    Begin Augmentin - take until gone.  IF symptoms worsen or fail to improve, follow up is needed in the next 3-7 days.    I do have orders in for testing for you for the joint pain - if the only area is the right elbow to pinky finger, this is unlikely to be a rheumatologic cause or related to the current infection. The inflammation testing may be elevated due to current illness or I just have the ASO titer ordered for you.  I would recommend an in person visit to evaluate the arm in a few days if symptoms persist.  Avoid direct pressure to the elbow.          Return today (on 2/23/2023) for Lab Work.    Holly Rosales MD  [facility]    Boris Orozco is a 27  year old presenting with  for the following health issues:  Pharyngitis      History of Present Illness       Reason for visit:  Sore throat, bad breath, tonsil stone, swollen painful lymph node, joint pain  Symptom onset:  1-3 days ago  Symptoms include:  Sore throat, bad breath, tonsil stone, swollen painful lymph nodes  Symptom intensity:  Moderate  Symptom progression:  Staying the same  Had these symptoms before:  Yes  Has tried/received treatment for these symptoms:  Yes  Previous treatment was successful:  Yes  Prior treatment description:  I took amoxicillin 250mg for 7 days 3weeks ago.  What makes it worse:  No  What makes it better:  No    She eats 2-3 servings of fruits and vegetables daily.She consumes 0 sweetened beverage(s) daily.She exercises with enough effort to increase her heart rate 30 to 60 minutes per day.  She exercises with enough effort to increase her heart rate 4 days per week.   She is taking medications regularly.       Acute Illness  Acute illness concerns: as above  Onset/Duration: 1 month ago similar symptoms took left over amoxicillin - recurred yesterday AM.  Symptoms:  Fever: not with this episode.    Chills/Sweats: YES  Headache (location?): No  Sinus Pressure: No  Conjunctivitis:  No  Ear Pain: no  Rhinorrhea: No  Congestion: No  Sore Throat: YES  Cough: no  Wheeze: No  Decreased Appetite: No  Nausea: No  Vomiting: No  Diarrhea: No  Dysuria/Freq.: No  Dysuria or Hematuria: No  Fatigue/Achiness: YES- right side elbow to pinky finger - had similar symptoms as a child -   Sick/Strep Exposure: No  Therapies tried and outcome: took ibuprofen - helped headache.  Gargle with salt water.      Yesterday AM cough, production, coughed up a chunk of material, bad breath.     Had leftover antibiotic and used it last month -   amxoicillin 500 mg three times a day for 6 days.       Review of Systems   Constitutional, HEENT, cardiovascular, pulmonary, gi and gu systems are negative,  except as otherwise noted.      Objective           Vitals:  No vitals were obtained today due to virtual visit.    Physical Exam   GENERAL: Healthy, alert and no distress  EYES: Eyes grossly normal to inspection.  No discharge or erythema, or obvious scleral/conjunctival abnormalities.  RESP: No audible wheeze, cough, or visible cyanosis.  No visible retractions or increased work of breathing.    SKIN: Visible skin clear. No significant rash, abnormal pigmentation or lesions.  NEURO: Cranial nerves grossly intact.  Mentation and speech appropriate for age.  PSYCH: Mentation appears normal, affect normal/bright, judgement and insight intact, normal speech and appearance well-groomed.    Mychart photo sent:   Bilateral enlarged tonsils, right sided tonsillar stone- white,                                Exudate bilaterally.              Video-Visit Details    Type of service:  Video Visit     Originating Location (pt. Location): Home  Distant Location (provider location):  On-site  Platform used for Video Visit: "[employer]

## 2023-03-20 ENCOUNTER — MYC MEDICAL ADVICE (OUTPATIENT)
Dept: FAMILY MEDICINE | Facility: CLINIC | Age: 28
End: 2023-03-20

## 2023-05-31 ENCOUNTER — PATIENT OUTREACH (OUTPATIENT)
Dept: OBGYN | Facility: CLINIC | Age: 28
End: 2023-05-31

## 2023-05-31 DIAGNOSIS — R87.612 PAPANICOLAOU SMEAR OF CERVIX WITH LOW GRADE SQUAMOUS INTRAEPITHELIAL LESION (LGSIL): ICD-10-CM

## 2023-07-26 NOTE — TELEPHONE ENCOUNTER
FYI to provider - Patient is lost to pap tracking follow-up. Attempts to contact pt have been made per reminder process and there has been no reply and/or no appt scheduled. Contact hx listed below.     2019 NIL pap  5/18/22 LSIL pap. Plan colp due bef 8/18/22 6/13/22 [place] Bx's & ECC: benign. Plan 1 year cotest  5/31/23 Reminder mychart  6/28/23 Reminder call - lm  7/26/23 Lost to follow-up for pap tracking     Denise Warner RN BSN, Pap Tracking

## 2023-08-27 ENCOUNTER — HOSPITAL ENCOUNTER (EMERGENCY)
Facility: CLINIC | Age: 28
Discharge: HOME | End: 2023-08-28
Attending: EMERGENCY MEDICINE | Admitting: EMERGENCY MEDICINE
Payer: COMMERCIAL

## 2023-08-27 VITALS
DIASTOLIC BLOOD PRESSURE: 80 MMHG | OXYGEN SATURATION: 99 % | SYSTOLIC BLOOD PRESSURE: 125 MMHG | TEMPERATURE: 98.2 F | WEIGHT: 112.43 LBS | HEIGHT: 61 IN | HEART RATE: 68 BPM | BODY MASS INDEX: 21.23 KG/M2

## 2023-08-27 DIAGNOSIS — R20.2 PARESTHESIAS: ICD-10-CM

## 2023-08-27 DIAGNOSIS — R29.90 ABNORMAL NEUROLOGICAL EXAM: ICD-10-CM

## 2023-08-27 LAB
BASOPHILS # BLD AUTO: 0 10E3/UL (ref 0–0.2)
BASOPHILS NFR BLD AUTO: 1 %
EOSINOPHIL # BLD AUTO: 0.4 10E3/UL (ref 0–0.7)
EOSINOPHIL NFR BLD AUTO: 5 %
ERYTHROCYTE [DISTWIDTH] IN BLOOD BY AUTOMATED COUNT: 12.3 % (ref 10–15)
GLUCOSE BLDC GLUCOMTR-MCNC: 119 MG/DL (ref 70–99)
HCG SERPL QL: NEGATIVE
HCT VFR BLD AUTO: 40.8 % (ref 35–47)
HGB BLD-MCNC: 13.7 G/DL (ref 11.7–15.7)
IMM GRANULOCYTES # BLD: 0 10E3/UL
IMM GRANULOCYTES NFR BLD: 0 %
LYMPHOCYTES # BLD AUTO: 3.3 10E3/UL (ref 0.8–5.3)
LYMPHOCYTES NFR BLD AUTO: 43 %
MCH RBC QN AUTO: 31.6 PG (ref 26.5–33)
MCHC RBC AUTO-ENTMCNC: 33.6 G/DL (ref 31.5–36.5)
MCV RBC AUTO: 94 FL (ref 78–100)
MONOCYTES # BLD AUTO: 0.4 10E3/UL (ref 0–1.3)
MONOCYTES NFR BLD AUTO: 6 %
NEUTROPHILS # BLD AUTO: 3.5 10E3/UL (ref 1.6–8.3)
NEUTROPHILS NFR BLD AUTO: 45 %
NRBC # BLD AUTO: 0 10E3/UL
NRBC BLD AUTO-RTO: 0 /100
PLATELET # BLD AUTO: 381 10E3/UL (ref 150–450)
RBC # BLD AUTO: 4.34 10E6/UL (ref 3.8–5.2)
WBC # BLD AUTO: 7.7 10E3/UL (ref 4–11)

## 2023-08-27 PROCEDURE — 99284 EMERGENCY DEPT VISIT MOD MDM: CPT | Performed by: EMERGENCY MEDICINE

## 2023-08-27 PROCEDURE — 84703 CHORIONIC GONADOTROPIN ASSAY: CPT | Performed by: EMERGENCY MEDICINE

## 2023-08-27 PROCEDURE — 85018 HEMOGLOBIN: CPT | Performed by: EMERGENCY MEDICINE

## 2023-08-27 PROCEDURE — 80053 COMPREHEN METABOLIC PANEL: CPT | Performed by: EMERGENCY MEDICINE

## 2023-08-27 PROCEDURE — 82962 GLUCOSE BLOOD TEST: CPT

## 2023-08-27 PROCEDURE — 99283 EMERGENCY DEPT VISIT LOW MDM: CPT | Performed by: EMERGENCY MEDICINE

## 2023-08-27 PROCEDURE — 36415 COLL VENOUS BLD VENIPUNCTURE: CPT | Performed by: EMERGENCY MEDICINE

## 2023-08-27 ASSESSMENT — ACTIVITIES OF DAILY LIVING (ADL): ADLS_ACUITY_SCORE: 33

## 2023-08-28 LAB
ALBUMIN SERPL BCG-MCNC: 4.5 G/DL (ref 3.5–5.2)
ALP SERPL-CCNC: 60 U/L (ref 35–104)
ALT SERPL W P-5'-P-CCNC: 18 U/L (ref 0–50)
ANION GAP SERPL CALCULATED.3IONS-SCNC: 12 MMOL/L (ref 7–15)
AST SERPL W P-5'-P-CCNC: 26 U/L (ref 0–45)
BILIRUB SERPL-MCNC: 0.3 MG/DL
BUN SERPL-MCNC: 17.9 MG/DL (ref 6–20)
CALCIUM SERPL-MCNC: 9.4 MG/DL (ref 8.6–10)
CHLORIDE SERPL-SCNC: 106 MMOL/L (ref 98–107)
CREAT SERPL-MCNC: 0.99 MG/DL (ref 0.51–0.95)
DEPRECATED HCO3 PLAS-SCNC: 25 MMOL/L (ref 22–29)
GFR SERPL CREATININE-BSD FRML MDRD: 79 ML/MIN/1.73M2
GLUCOSE SERPL-MCNC: 108 MG/DL (ref 70–99)
HOLD SPECIMEN: NORMAL
POTASSIUM SERPL-SCNC: 3.7 MMOL/L (ref 3.4–5.3)
PROT SERPL-MCNC: 7.5 G/DL (ref 6.4–8.3)
SODIUM SERPL-SCNC: 143 MMOL/L (ref 136–145)

## 2023-08-28 PROCEDURE — 99207 PR NO BILLABLE SERVICE THIS VISIT: CPT | Performed by: STUDENT IN AN ORGANIZED HEALTH CARE EDUCATION/TRAINING PROGRAM

## 2023-08-28 ASSESSMENT — ACTIVITIES OF DAILY LIVING (ADL)
ADLS_ACUITY_SCORE: 35
ADLS_ACUITY_SCORE: 35

## 2023-08-28 NOTE — ED PROVIDER NOTES
.Sign Out Provider: Dr. Cruz    Sign Out Plan: 27-year-old female here with sudden onset of bilateral legs tingling/numbness.  Patient pending neurology consult at the time of signout.    Reassessment: Neurology evaluated the patient in the emergency department.  At this time no emergent need for hospitalization or further work-up however do recommend close outpatient follow-up in neurology clinic.  Referral was placed.  Patient agrees with the plan.  Strict return precautions discussed.    Disposition: Discharge       Gabrielle Diaz MD  08/28/23 [id number]

## 2023-08-28 NOTE — ED TRIAGE NOTES
"Pt presents ambulatory to the ER for bilateral leg weakness, numbness and tingling onset an hr ago. Pt states \" I was just sitting on the couch when I had sudden onset of bilateral lower leg tingling and numbness\". Pt states after I felt numbness and tingling, \" I started shaking\".   Triage         "

## 2023-08-28 NOTE — CONSULTS
[facility]  Neurology Consultation    Patient Name:  Pam Thompson  MRN:  [id number]    :  1995  Date of Service:  2023  Primary care provider:  [place], [facility]      Neurology consultation service was asked to see Pam Thompson by Dr. Cruz to evaluate for BLE numbness.    Chief Complaint:  Numbness    History of Present Illness:   Pam Thompson is a 27 year old female with history of GERD and migraine who presents with numbness in the bilateral feet. The patient reports that she was in her normal state of health 22 when she was sitting on the couch with her legs up and noted that her feet went numb. She tried to move them and massage them but sensation did not return. States that this feels similar to when her extremities will fall asleep but that it is not improving. She reports no associated weakness or pain and came to the ED for further evaluation. The patient reports some mild questionable hand tingling started shortly after arrival in the ED. She denies any difficultly with ambulation or balance with these symptoms. Has never had similar symptoms in the past. Does not currently have a headache. No recent URI or vaccinations per report. No problems with bowel or bladder function.     ROS  A comprehensive ROS was performed and pertinent findings were included in HPI.     PMH  Past Medical History:   Diagnosis Date    History of colposcopy 2022    Migraine      Past Surgical History:   Procedure Laterality Date    APPENDECTOMY       SECTION N/A 10/27/2020    Procedure:  SECTION;  Surgeon: Janet De La Garza MD;  Location:  L+D    COLONOSCOPY N/A 9/15/2022    Procedure: COLONOSCOPY;  Surgeon: Amandeep Maurice MD;  Location:  GI    ESOPHAGOSCOPY, GASTROSCOPY, DUODENOSCOPY (EGD), COMBINED N/A 9/15/2022    Procedure: ESOPHAGOGASTRODUODENOSCOPY, WITH BIOPSY;  Surgeon: Kingsley Heller  "MD Amandeep;  Location:  GI       Medications   I have personally reviewed the patient's medication list.     Allergies  I have personally reviewed the patient's allergy list.     Social History  Social History     Tobacco Use    Smoking status: Never    Smokeless tobacco: Never   Vaping Use    Vaping Use: Never used   Substance Use Topics    Alcohol use: Not Currently    Drug use: Never     Family History    Reviewed, and notably negative for neuropathy      Physical Examination   Vitals: /80   Pulse 68   Temp 98.2  F (36.8  C) (Oral)   Ht 1.549 m (5' 1\")   Wt 51 kg (112 lb 7 oz)   SpO2 99%   BMI 21.24 kg/m    General: Lying in bed, NAD  Head: NC/AT  Eyes: no icterus, op pink and moist  Cardiac: RRR. Extremities warm, no edema.   Respiratory: non-labored on RA  GI: S/NT/ND  Skin: No rash or lesion on exposed skin  Psych: Mood pleasant, affect congruent    Neuro:  Mental status: Awake, alert, attentive, oriented to self, time, place, and circumstance. Language is fluent and coherent with intact comprehension of complex commands, naming and repetition.  Cranial nerves: VFF, PERRL, conjugate gaze, EOMI, facial sensation intact, face symmetric, shoulder shrug strong, tongue/uvula midline, no dysarthria.   Motor: Normal bulk and tone. No abnormal movements. 5/5 strength bilaterally in deltoids, biceps, triceps, hand , hip flexors, hip extensors, knee flexion, knee extension, plantarflexion, dorsiflexion.   Reflexes: Normo-reflexic and symmetric biceps, brachioradialis, triceps, patellae, and achilles. Negative Escoto, no clonus, toes down-going.  Sensory: Reduced sensation to pin in bilateral toes to mid base of toes and fingers to DIP but is able to differentiate sharp and dull in affected areas. Intact to light touch, proprioception, and vibration (15s at bilateral toes, >12s at bilateral hands) proprioception in proximal and distal aspects of all 4 extremities   Coordination: FNF and HS without " ataxia or dysmetria.   Gait: Normal width, stride length, turn, and arm swing. Station normal. Heel, toe, and tandem walk intact. Romberg with some mild instability but does not need to take a step to catch herself.    Investigations   I have personally reviewed pertinent labs, tests, and radiological imaging. Discussion of notable findings is included under Impression.     Was patient transferred from outside hospital?   No    Impression  27 year old female with history of GERD and migraine who presents with numbness in the bilateral feet and hands. Exam with minimal loss of sensation to pinprick in distal aspects of b/l hands and feet with intact reflexes and no weakness. Differential remains broad but have relatively low suspicion for Guillain-Hanover (given the intact reflexes and benign exam) or upper motor neuron pathology given the mute toes, normoreflexia, absence of clonus and negative Escoto. Cannot exclude the possibility that this is the beginning of a neuropathy, but the only abnormalities on exam would suggest a small fiber neuropathy rather than a large fiber neuropathy and are extremely mild. Given this, would be reasonable to have the patient monitor her symptoms and discharge home with outpatient follow-up if symptoms do not recover. Discussed return precautions with the patient including significantly progressive numbness or gait instability and she and her  voice understanding of this.      Recommendations  - Draw B6 prior to leaving  - Follow-up with outpatient neurology if symptoms do not improve    Thank you for involving Neurology in the care of Pam Thompson.  Please do not hesitate to call with questions/concerns (consult pager [phone]).      Patient was discussed with Dr. Mcdonald over the phone.    Ricardo Del Cid MD  PGY-4 Neurology Resident

## 2023-08-28 NOTE — DISCHARGE INSTRUCTIONS
Please follow-up with your primary care provider in the next 3 to 5 days for further evaluation and follow-up.  Please follow-up with neurology in the outpatient clinic.  A referral has been placed so someone should call you to schedule an appointment.  Please return to the emergency department if you develop any worsening symptoms.

## 2023-08-28 NOTE — ED PROVIDER NOTES
[facility] [facility] ([facility])    23       ED PROVIDER NOTE        History     Chief Complaint   Patient presents with    Numbness    Tingling     HPI  Pam Thompson is a 27 year old female with history of GERD and H. Pylori who presents to the ED today with c/o rather sudden onset of bilateral leg tingling/numbness. Pt reports that she was seated on the couch at the time of onset. She felt this tingling/numbness then started massaging legs and placed some sort of oil on legs. She was able to get up and walk around, but the tingling/numbness persisted. Pt denies any weakness or numbness, she has not noticed any other symptoms. When she arrived here in the ED she began to experience some bilateral hand tingling. She has never had this before. Denies headache or back pain. Reports history of chronic back pain, however, does not have any back pain at present.    Denies any recent illness, URI or GI symptoms, or vaccinations.     This part of the medical record was transcribed by Nura Yates Medical Scribe, from a dictation done by Angella Cruz MD.     Past Medical History  Past Medical History:   Diagnosis Date    History of colposcopy 2022    Migraine      Past Surgical History:   Procedure Laterality Date    APPENDECTOMY       SECTION N/A 10/27/2020    Procedure:  SECTION;  Surgeon: Janet De La Garza MD;  Location:  L+D    COLONOSCOPY N/A 9/15/2022    Procedure: COLONOSCOPY;  Surgeon: Amandeep Maurice MD;  Location:  GI    ESOPHAGOSCOPY, GASTROSCOPY, DUODENOSCOPY (EGD), COMBINED N/A 9/15/2022    Procedure: ESOPHAGOGASTRODUODENOSCOPY, WITH BIOPSY;  Surgeon: Amandeep Maurice MD;  Location:  GI     amoxicillin-clavulanate (AUGMENTIN) 875-125 MG tablet  EPINEPHrine (EPIPEN 2-RICO) 0.3 MG/0.3ML injection 2-pack      Allergies   Allergen Reactions    Watermelon Flavor Anaphylaxis     Lip and face swelling.    Bananas [Banana] Swelling      "Swollen lips and tongue    Cefazolin Itching    Cephalosporins     Pineapple      Swollen lips and tongue     Family History  Family History   Problem Relation Age of Onset    Diabetes Father      Social History   Social History     Tobacco Use    Smoking status: Never    Smokeless tobacco: Never   Vaping Use    Vaping Use: Never used   Substance Use Topics    Alcohol use: Not Currently    Drug use: Never      Past medical history, past surgical history, medications, allergies, family history, and social history were reviewed with the patient. No additional pertinent items.      A medically appropriate review of systems was performed with pertinent positives and negatives noted in the HPI, and all other systems negative.    Physical Exam   BP: 125/80  Pulse: 68  Temp: 98.2  F (36.8  C)  Height: 154.9 cm (5' 1\")  Weight: 51 kg (112 lb 7 oz)  SpO2: 99 %  Physical Exam  Vitals and nursing note reviewed.   Constitutional:       General: She is not in acute distress.     Appearance: She is not diaphoretic.      Comments: Thin adult female, sitting in chair, no acute distress   HENT:      Head: Atraumatic.      Mouth/Throat:      Mouth: Mucous membranes are moist.      Pharynx: Oropharynx is clear. No oropharyngeal exudate.   Eyes:      General: No scleral icterus.     Pupils: Pupils are equal, round, and reactive to light.   Cardiovascular:      Rate and Rhythm: Normal rate.      Pulses: Normal pulses.      Heart sounds: Normal heart sounds. No murmur heard.  Pulmonary:      Effort: No respiratory distress.      Breath sounds: Normal breath sounds.   Abdominal:      General: Bowel sounds are normal.      Palpations: Abdomen is soft.      Tenderness: There is no abdominal tenderness.   Musculoskeletal:         General: No tenderness.   Skin:     General: Skin is warm.      Findings: No rash.   Neurological:      Mental Status: She is alert.      GCS: GCS eye subscore is 4. GCS verbal subscore is 5. GCS motor subscore is " 6.      Cranial Nerves: Cranial nerves 2-12 are intact.      Sensory: Sensation is intact.      Motor: Motor function is intact.      Deep Tendon Reflexes:      Reflex Scores:       Patellar reflexes are 3+ on the right side and 3+ on the left side.     Comments: Cranial nerves are intact.  Sensation intact to light touch over both lower extremities and upper extremities.  Patellar reflexes 3+ bilaterally.    Knee extension and flexion intact bilaterally, plantarflexion and dorsiflexion intact bilaterally.    Finger-nose-finger testing is intact bilaterally.  Gait is stable and normal.  Heel walking and toe walking appear intact.    During testing for pronator drift, with eyes closed and holding arms out in front of her, patient immediately becomes unstable and starts swaying, nearly falls on multiple occasions.           ED Course, Procedures, & Data      Procedures                   Results for orders placed or performed during the hospital encounter of 08/27/23   Glucose by meter     Status: Abnormal   Result Value Ref Range    GLUCOSE BY METER POCT 119 (H) 70 - 99 mg/dL   [place] Draw     Status: None (In process)    Narrative    The following orders were created for panel order [place] Draw.  Procedure                               Abnormality         Status                     ---------                               -----------         ------                     Extra Blue Top Tube[[id number]]                              In process                 Extra Red Top Tube[[id number]]                               In process                 Extra Green Top (Lithium...[[id number]]                      In process                 Extra Purple Top Tube[[id number]]                            In process                   Please view results for these tests on the individual orders.   Comprehensive metabolic panel     Status: Abnormal   Result Value Ref Range    Sodium 143 136 - 145 mmol/L    Potassium 3.7 3.4 - 5.3 mmol/L     Chloride 106 98 - 107 mmol/L    Carbon Dioxide (CO2) 25 22 - 29 mmol/L    Anion Gap 12 7 - 15 mmol/L    Urea Nitrogen 17.9 6.0 - 20.0 mg/dL    Creatinine 0.99 (H) 0.51 - 0.95 mg/dL    Calcium 9.4 8.6 - 10.0 mg/dL    Glucose 108 (H) 70 - 99 mg/dL    Alkaline Phosphatase 60 35 - 104 U/L    AST 26 0 - 45 U/L    ALT 18 0 - 50 U/L    Protein Total 7.5 6.4 - 8.3 g/dL    Albumin 4.5 3.5 - 5.2 g/dL    Bilirubin Total 0.3 <=1.2 mg/dL    GFR Estimate 79 >60 mL/min/1.73m2   HCG qualitative pregnancy (blood)     Status: Normal   Result Value Ref Range    hCG Serum Qualitative Negative Negative   CBC with platelets and differential     Status: None   Result Value Ref Range    WBC Count 7.7 4.0 - 11.0 10e3/uL    RBC Count 4.34 3.80 - 5.20 10e6/uL    Hemoglobin 13.7 11.7 - 15.7 g/dL    Hematocrit 40.8 35.0 - 47.0 %    MCV 94 78 - 100 fL    MCH 31.6 26.5 - 33.0 pg    MCHC 33.6 31.5 - 36.5 g/dL    RDW 12.3 10.0 - 15.0 %    Platelet Count 381 150 - 450 10e3/uL    % Neutrophils 45 %    % Lymphocytes 43 %    % Monocytes 6 %    % Eosinophils 5 %    % Basophils 1 %    % Immature Granulocytes 0 %    NRBCs per 100 WBC 0 <1 /100    Absolute Neutrophils 3.5 1.6 - 8.3 10e3/uL    Absolute Lymphocytes 3.3 0.8 - 5.3 10e3/uL    Absolute Monocytes 0.4 0.0 - 1.3 10e3/uL    Absolute Eosinophils 0.4 0.0 - 0.7 10e3/uL    Absolute Basophils 0.0 0.0 - 0.2 10e3/uL    Absolute Immature Granulocytes 0.0 <=0.4 10e3/uL    Absolute NRBCs 0.0 10e3/uL   CBC with Platelets & Differential     Status: None    Narrative    The following orders were created for panel order CBC with Platelets & Differential.  Procedure                               Abnormality         Status                     ---------                               -----------         ------                     CBC with platelets and d...[[id number]]                      Final result                 Please view results for these tests on the individual orders.            Critical care was not  performed.     Medical Decision Making  The patient's presentation was of moderate complexity (an undiagnosed new problem with uncertain diagnosis).    The patient's evaluation involved:  ordering and/or review of 3+ test(s) in this encounter (see separate area of note for details)  discussion of management or test interpretation with another health professional (see separate area of note for details)    The patient's management necessitated further care after sign-out to Dr. Diaz (see their note for further management).    Assessment & Plan    Pt presents for c/o paresthesias. On my evaluation she is alert, cooperative, no acute distress. Vital signs are within normal limits. Neurologic exam shows intact sensation to light touch throughout. Cranial nerves are intact. Motor function appears intact. Patellar reflexes are symmetric bilaterally. Fingernails/finger testing is intact bilaterally. Pt is able to walk, tandem walk, and toe walk without difficult. However, on pronator drift testing, the pt very quickly becomes unsteady, sways and nearly falls on several occasions.     Differential diagnosis of some sort of neurologic issue is quite extensive. Certainly need to consider some sort of transverse myelitis, Guillain-Barré syndrome, both of which are unlikely at this point. Particularly given intact motor function and patellar reflexes. However, some sort of large fiber polyneuropathy is possible as she becomes quite symptomatic when she closes her eyes and holds her hands out for pronator drift testing.     I have ordered basic labs, CBC shows normal white count of 7.7, normal hemoglobin and normal platelets., CMP shows normal electrolytes, creatinine 0.99, glucose 108, normal LFTs, HCG is negative. I did speak to the neurology resident and a consult has been placed.     Patient will be signed out to the overnight physician to follow-up on neurology recommendations.    This part of the medical record was  transcribed by Taisha Soto Scribe, from a dictation done by Angella Cruz MD.     I have reviewed the nursing notes. I have reviewed the findings, diagnosis, plan and need for follow up with the patient.    New Prescriptions    No medications on file       Final diagnoses:   Abnormal neurological exam   Paresthesias - subjective   I, Nura Yates, am serving as a trained medical scribe to document services personally performed by Angella Cruz MD based on the provider's statements to me on August 28, 2023.  This document has been checked and approved by the attending provider.    I, Angella Cruz MD, was physically present and have reviewed and verified the accuracy of this note documented by taisha Sotoibdesmond.      Angella Cruz MD  [facility] EMERGENCY DEPARTMENT  8/27/2023     Angella Cruz MD  08/28/23 0042

## 2023-08-29 ENCOUNTER — LAB (OUTPATIENT)
Dept: LAB | Facility: HOSPITAL | Age: 28
End: 2023-08-29
Payer: COMMERCIAL

## 2023-08-29 ENCOUNTER — OFFICE VISIT (OUTPATIENT)
Dept: NEUROLOGY | Facility: CLINIC | Age: 28
End: 2023-08-29
Attending: STUDENT IN AN ORGANIZED HEALTH CARE EDUCATION/TRAINING PROGRAM
Payer: COMMERCIAL

## 2023-08-29 VITALS
DIASTOLIC BLOOD PRESSURE: 63 MMHG | HEART RATE: 72 BPM | BODY MASS INDEX: 21.52 KG/M2 | SYSTOLIC BLOOD PRESSURE: 99 MMHG | WEIGHT: 114 LBS | HEIGHT: 61 IN

## 2023-08-29 DIAGNOSIS — R20.2 PARESTHESIAS: ICD-10-CM

## 2023-08-29 DIAGNOSIS — R20.2 PARESTHESIA: Primary | ICD-10-CM

## 2023-08-29 DIAGNOSIS — R51.9 SEVERE HEADACHE: ICD-10-CM

## 2023-08-29 DIAGNOSIS — R20.2 PARESTHESIAS: Primary | ICD-10-CM

## 2023-08-29 LAB — VIT B12 SERPL-MCNC: 801 PG/ML (ref 232–1245)

## 2023-08-29 PROCEDURE — 99205 OFFICE O/P NEW HI 60 MIN: CPT | Performed by: PSYCHIATRY & NEUROLOGY

## 2023-08-29 PROCEDURE — 82607 VITAMIN B-12: CPT

## 2023-08-29 PROCEDURE — 36415 COLL VENOUS BLD VENIPUNCTURE: CPT

## 2023-08-29 PROCEDURE — 86038 ANTINUCLEAR ANTIBODIES: CPT

## 2023-08-29 NOTE — NURSING NOTE
Chief Complaint   Patient presents with    Numbness     Pt has numbness/tingling in bilat feet and calves, and bilat hands. She states it started 2 days ago. It is there all the time.     Luisa Pedraza LPN on 8/29/2023 at 2:21 PM

## 2023-08-29 NOTE — PROGRESS NOTES
In person evaluation        [facility]  8/29/2023, in person consultation      27-year-old been evaluated neurologically for:  Sensory changes in her legs and hands acute onset  Migraines with severe 3-day headache prior to these events      Medical condition history onset  Patient states that 1 week ago she had a 3-day bad headache with nausea but no vomiting  Had to get up and take care of the kids though no neurologic changes  Has had some chronic burning dysesthesias on the right side of her face  No history of visual loss  Had an episode of Jubilee vision unclear if this was oscillopsia but it was not associated with vertigo    Then August 27, 2023 watching a movie became numb in her feet up to her knee bilaterally came on over minutes  Took about an hour to get to the ER and then both hands seem to be numb  Numbness is in the hands and hand in the feet and legs    No bowel or bladder difficulty  He is able to ambulate  Except for some chronic numbness on the right face no new cranial nerve changes    Does have a history of migraines but no specific headache with the symptoms            A.  Patient with sudden onset bilateral leg numbness 8/27/2023       Onset of numbness occurred while seated on the couch was able to ambulate       No recent URI/GI symptoms or vaccinations       Did have a 3-day headache a week prior to this         Has 3 children one of them with a rash not sure if it was a viral syndrome         In the past has had vision jiggling x1 hour at night but no true vertigo or nausea vomiting       In the past has had some numbness on the right side of her face       Sometimes might have a little bit of trouble with swallowing her saliva chronic         Hyperextensible joints      B.  History of migraine headaches       Frequency 1/month        Duration 2-5 hours        No loss of vision    C.  Severe headache lasting 3 days August 21, 2023        Had nausea no vomiting felt sick but has 3 kids had to get  up and do things          Past medical history  GERD  Migraines      Habits  Non-smoker  Does not drink alcohol  3 children ages 8, 5 and 2 (8/29/2023)    Family history  Father diabetes  Mother migraines  Paternal grandmother migraines      Work-up    Laboratory data review                     8/2023  NA/K           143/3.7  BUN/CR      17.9/0.99  GLU             108  AST              26  WBC/HGB    7.7/13.7  PLTs             381,000  hCG            negative    B12             801  NARAYAN            negative      Exam    Review of systems pertinent positive negatives  No diplopia no dysarthria no true dysphagia  May have some trouble swallowing saliva but that has been going on on and off for a long time does not cough with eating    History of migraine headaches see above  No true vision loss  No true vertigo    Had episode of tingling vision lasting an hour at night where things seem to bother up and down    No chest pain no shortness of breath no nausea vomiting    No bowel or bladder difficulty    Did have numbness on the right side of her face going on for years mild burning type sensation    No true weakness    Otherwise review systems negative    General exam  Blood pressure 99/63, pulse 72  Alert orient x3   helps with translation  HEENT normal  Lungs clear  Heart rate regular  Abdomen soft  Symmetrical pulses  No edema the feet    Hyperextensible joints    Neurologic exam  Alert orient x3  Normal prosody speech  Normal naming  Normal comprehension  Normal repetition  No aphasia  No neglect  Memory recall okay    Cranial nerves II through XII  No ophthalmoplegia  No nystagmus  Optic fundi good no papilledema  Visual fields intact  Face symmetrical  Speech and tongue twisters okay    Upper extremities reported right over left  Deltoid 5/5  Triceps 5/5  Biceps 5/5  Wrist/finger flexors 5/5  Wrist/finger extensors 5/5  Intrinsic strength 5/5    No drift  No tremor  Rapid alternating movements good    Lower  extremities  Iliopsoas 5/5  Quadriceps 5/5  Hamstring 5/5  Anterior tibial 5/5  Gastrocnemius 5/5  EHL 5/5    No spasticity    Reflexes reported right over left  Biceps 2/2  Triceps 2/2  Knee 2/2  Ankle 2/2  Toes downgoing  Negative Escoto reflex    Gait  Normal gait  Can tandem gait with eyes open  Romberg plus minus instability        Assessment/plan    1.  Acute onset of paresthesias in the feet/legs and hands bilaterally it came on fairly quickly       Previous chronic numbness of the right face       Some history of 3-day headache a week before       Some jiggly vision in the past question oscillopsia       Retained reflexes         Question demyelinating disease and with involvement of cranial nerves and face concern for MS    2.  Does have some history that she has to shake her hands to get the sensation back into them       Has hyperextensible joints       Reflexes retained normal strength and gait right now         Less likely GBS         3.  History of migraines chronic       Mother and maternal grandmother with migraines    Diagnosis  Symmetrical paresthesias rule out MS  Rule out neuropathy    MRI head with and without contrast  EMG right arm and right leg with F waves  B12  NARAYAN    Patient has a relatively benign exam at this time fully ambulatory no shortness of breath no progressive symptoms  They are wondering whether they can go up to [place] to visit as long as they are in a stabilized area where medical care is available if she had progressive symptoms then she should go to the ER.    Follow-up at the time of the EMG    As part of the visit  Reviewed previous ER notes and medical records    Total care time today 62 minutes    Addendum 8/30/2023  Laboratory data 8/29/2023  B12 801  NARAYAN negative  MRI head 8/30/2023  1.  Two punctate nonenhancing foci of T2 prolongation within the subcortical white matter of the bilateral frontal lobes,         of doubtful clinical significance.  2.  Otherwise,  unremarkable brain MRI.  EMG pending 11/22/2023  Follow-up visit pending 11/22/2023

## 2023-08-29 NOTE — LETTER
8/29/2023         RE: aPm Thompson  [address]  [place] [address]        Dear Colleague,    Thank you for referring your patient, Pam Thompson, to the [facility] [place]. Please see a copy of my visit note below.    In person evaluation        HPI  8/29/2023, in person consultation      27-year-old been evaluated neurologically for:  Sensory changes in her legs and hands acute onset  Migraines with severe 3-day headache prior to these events      Medical condition history onset  Patient states that 1 week ago she had a 3-day bad headache with nausea but no vomiting  Had to get up and take care of the kids though no neurologic changes  Has had some chronic burning dysesthesias on the right side of her face  No history of visual loss  Had an episode of Jubilee vision unclear if this was oscillopsia but it was not associated with vertigo    Then August 27, 2023 watching a movie became numb in her feet up to her knee bilaterally came on over minutes  Took about an hour to get to the ER and then both hands seem to be numb  Numbness is in the hands and hand in the feet and legs    No bowel or bladder difficulty  He is able to ambulate  Except for some chronic numbness on the right face no new cranial nerve changes    Does have a history of migraines but no specific headache with the symptoms            A.  Patient with sudden onset bilateral leg numbness 8/27/2023       Onset of numbness occurred while seated on the couch was able to ambulate       No recent URI/GI symptoms or vaccinations       Did have a 3-day headache a week prior to this         Has 3 children one of them with a rash not sure if it was a viral syndrome         In the past has had vision jiggling x1 hour at night but no true vertigo or nausea vomiting       In the past has had some numbness on the right side of her face       Sometimes might have a little bit of trouble with swallowing her saliva  chronic         Hyperextensible joints      B.  History of migraine headaches       Frequency 1/month        Duration 2-5 hours        No loss of vision    C.  Severe headache lasting 3 days August 21, 2023        Had nausea no vomiting felt sick but has 3 kids had to get up and do things          Past medical history  GERD  Migraines      Habits  Non-smoker  Does not drink alcohol  3 children ages 8, 5 and 2 (8/29/2023)    Family history  Father diabetes  Mother migraines  Paternal grandmother migraines      Work-up    Laboratory data review  NA/K           143/3.7  BUN/CR      17.9/0.99  GLU             108  AST              26  WBC/HGB    7.7/13.7  PLTs             381,000  hCG            negative      Exam    Review of systems pertinent positive negatives  No diplopia no dysarthria no true dysphagia  May have some trouble swallowing saliva but that has been going on on and off for a long time does not cough with eating    History of migraine headaches see above  No true vision loss  No true vertigo    Had episode of tingling vision lasting an hour at night where things seem to bother up and down    No chest pain no shortness of breath no nausea vomiting    No bowel or bladder difficulty    Did have numbness on the right side of her face going on for years mild burning type sensation    No true weakness    Otherwise review systems negative    General exam  Blood pressure 99/63, pulse 72  Alert orient x3   helps with translation  HEENT normal  Lungs clear  Heart rate regular  Abdomen soft  Symmetrical pulses  No edema the feet    Hyperextensible joints    Neurologic exam  Alert orient x3  Normal prosody speech  Normal naming  Normal comprehension  Normal repetition  No aphasia  No neglect  Memory recall okay    Cranial nerves II through XII  No ophthalmoplegia  No nystagmus  Optic fundi good no papilledema  Visual fields intact  Face symmetrical  Speech and tongue twisters okay    Upper extremities reported  right over left  Deltoid 5/5  Triceps 5/5  Biceps 5/5  Wrist/finger flexors 5/5  Wrist/finger extensors 5/5  Intrinsic strength 5/5    No drift  No tremor  Rapid alternating movements good    Lower extremities  Iliopsoas 5/5  Quadriceps 5/5  Hamstring 5/5  Anterior tibial 5/5  Gastrocnemius 5/5  EHL 5/5    No spasticity    Reflexes reported right over left  Biceps 2/2  Triceps 2/2  Knee 2/2  Ankle 2/2  Toes downgoing  Negative Escoto reflex    Gait  Normal gait  Can tandem gait with eyes open  Romberg plus minus instability        Assessment/plan    1.  Acute onset of paresthesias in the feet/legs and hands bilaterally it came on fairly quickly       Previous chronic numbness of the right face       Some history of 3-day headache a week before       Some jiggly vision in the past question oscillopsia       Retained reflexes         Question demyelinating disease and with involvement of cranial nerves and face concern for MS    2.  Does have some history that she has to shake her hands to get the sensation back into them       Has hyperextensible joints       Reflexes retained normal strength and gait right now         Less likely GBS         3.  History of migraines chronic       Mother and maternal grandmother with migraines    Diagnosis  Symmetrical paresthesias rule out MS  Rule out neuropathy    MRI head with and without contrast  EMG right arm and right leg with F waves  B12  NARAYAN    Patient has a relatively benign exam at this time fully ambulatory no shortness of breath no progressive symptoms  They are wondering whether they can go up to [place] to visit as long as they are in a stabilized area where medical care is available if she had progressive symptoms then she should go to the ER.    Follow-up at the time of the EMG    As part of the visit  Reviewed previous ER notes and medical records    Total care time today 62 minutes      Again, thank you for allowing me to participate in the care of your patient.         Sincerely,        emelina Okeefe MD

## 2023-08-30 ENCOUNTER — HOSPITAL ENCOUNTER (OUTPATIENT)
Dept: MRI IMAGING | Facility: CLINIC | Age: 28
Discharge: HOME | End: 2023-08-30
Attending: PSYCHIATRY & NEUROLOGY | Admitting: PSYCHIATRY & NEUROLOGY
Payer: COMMERCIAL

## 2023-08-30 DIAGNOSIS — R20.2 PARESTHESIAS: ICD-10-CM

## 2023-08-30 DIAGNOSIS — R51.9 SEVERE HEADACHE: ICD-10-CM

## 2023-08-30 LAB — ANA SER QL IF: NEGATIVE

## 2023-08-30 PROCEDURE — 70553 MRI BRAIN STEM W/O & W/DYE: CPT

## 2023-08-30 PROCEDURE — A9585 GADOBUTROL INJECTION: HCPCS | Performed by: PSYCHIATRY & NEUROLOGY

## 2023-08-30 PROCEDURE — 255N000002 HC RX 255 OP 636: Performed by: PSYCHIATRY & NEUROLOGY

## 2023-08-30 RX ORDER — GADOBUTROL 604.72 MG/ML
5 INJECTION INTRAVENOUS ONCE
Status: COMPLETED | OUTPATIENT
Start: 2023-08-30 | End: 2023-08-30

## 2023-08-30 RX ADMIN — GADOBUTROL 5 ML: 604.72 INJECTION INTRAVENOUS at 20:00

## 2023-09-01 ENCOUNTER — HOSPITAL ENCOUNTER (EMERGENCY)
Facility: CLINIC | Age: 28
Discharge: HOME | End: 2023-09-02
Attending: EMERGENCY MEDICINE | Admitting: EMERGENCY MEDICINE
Payer: COMMERCIAL

## 2023-09-01 ENCOUNTER — APPOINTMENT (OUTPATIENT)
Dept: MRI IMAGING | Facility: CLINIC | Age: 28
End: 2023-09-01
Attending: EMERGENCY MEDICINE
Payer: COMMERCIAL

## 2023-09-01 VITALS
OXYGEN SATURATION: 98 % | HEART RATE: 69 BPM | TEMPERATURE: 97.2 F | SYSTOLIC BLOOD PRESSURE: 105 MMHG | DIASTOLIC BLOOD PRESSURE: 68 MMHG | RESPIRATION RATE: 18 BRPM

## 2023-09-01 DIAGNOSIS — R20.2 PARESTHESIAS: ICD-10-CM

## 2023-09-01 LAB
ANION GAP SERPL CALCULATED.3IONS-SCNC: 12 MMOL/L (ref 7–15)
BASOPHILS # BLD AUTO: 0.1 10E3/UL (ref 0–0.2)
BASOPHILS NFR BLD AUTO: 1 %
BUN SERPL-MCNC: 11.8 MG/DL (ref 6–20)
CALCIUM SERPL-MCNC: 9.8 MG/DL (ref 8.6–10)
CHLORIDE SERPL-SCNC: 105 MMOL/L (ref 98–107)
CREAT SERPL-MCNC: 0.65 MG/DL (ref 0.51–0.95)
CRP SERPL-MCNC: <3 MG/L
DEPRECATED HCO3 PLAS-SCNC: 24 MMOL/L (ref 22–29)
EOSINOPHIL # BLD AUTO: 0.3 10E3/UL (ref 0–0.7)
EOSINOPHIL NFR BLD AUTO: 3 %
ERYTHROCYTE [DISTWIDTH] IN BLOOD BY AUTOMATED COUNT: 12.3 % (ref 10–15)
ERYTHROCYTE [SEDIMENTATION RATE] IN BLOOD BY WESTERGREN METHOD: 11 MM/HR (ref 0–20)
GFR SERPL CREATININE-BSD FRML MDRD: >90 ML/MIN/1.73M2
GLUCOSE CSF-MCNC: 52 MG/DL (ref 40–70)
GLUCOSE SERPL-MCNC: 85 MG/DL (ref 70–99)
HCG UR QL: NEGATIVE
HCT VFR BLD AUTO: 44.1 % (ref 35–47)
HGB BLD-MCNC: 15.1 G/DL (ref 11.7–15.7)
IMM GRANULOCYTES # BLD: 0 10E3/UL
IMM GRANULOCYTES NFR BLD: 0 %
LYMPHOCYTES # BLD AUTO: 3.1 10E3/UL (ref 0.8–5.3)
LYMPHOCYTES NFR BLD AUTO: 34 %
MCH RBC QN AUTO: 32.4 PG (ref 26.5–33)
MCHC RBC AUTO-ENTMCNC: 34.2 G/DL (ref 31.5–36.5)
MCV RBC AUTO: 95 FL (ref 78–100)
MONOCYTES # BLD AUTO: 0.3 10E3/UL (ref 0–1.3)
MONOCYTES NFR BLD AUTO: 4 %
NEUTROPHILS # BLD AUTO: 5.5 10E3/UL (ref 1.6–8.3)
NEUTROPHILS NFR BLD AUTO: 58 %
NRBC # BLD AUTO: 0 10E3/UL
NRBC BLD AUTO-RTO: 0 /100
PLATELET # BLD AUTO: 378 10E3/UL (ref 150–450)
POTASSIUM SERPL-SCNC: 4.1 MMOL/L (ref 3.4–5.3)
PROT CSF-MCNC: 21.7 MG/DL (ref 15–45)
RBC # BLD AUTO: 4.66 10E6/UL (ref 3.8–5.2)
SODIUM SERPL-SCNC: 141 MMOL/L (ref 136–145)
TSH SERPL DL<=0.005 MIU/L-ACNC: 0.93 UIU/ML (ref 0.3–4.2)
WBC # BLD AUTO: 9.2 10E3/UL (ref 4–11)

## 2023-09-01 PROCEDURE — 82945 GLUCOSE OTHER FLUID: CPT | Performed by: STUDENT IN AN ORGANIZED HEALTH CARE EDUCATION/TRAINING PROGRAM

## 2023-09-01 PROCEDURE — 85025 COMPLETE CBC W/AUTO DIFF WBC: CPT | Performed by: EMERGENCY MEDICINE

## 2023-09-01 PROCEDURE — 99222 1ST HOSP IP/OBS MODERATE 55: CPT | Performed by: STUDENT IN AN ORGANIZED HEALTH CARE EDUCATION/TRAINING PROGRAM

## 2023-09-01 PROCEDURE — 36415 COLL VENOUS BLD VENIPUNCTURE: CPT | Performed by: EMERGENCY MEDICINE

## 2023-09-01 PROCEDURE — 72158 MRI LUMBAR SPINE W/O & W/DYE: CPT

## 2023-09-01 PROCEDURE — 258N000003 HC RX IP 258 OP 636: Performed by: EMERGENCY MEDICINE

## 2023-09-01 PROCEDURE — 99285 EMERGENCY DEPT VISIT HI MDM: CPT | Mod: 25 | Performed by: EMERGENCY MEDICINE

## 2023-09-01 PROCEDURE — 96374 THER/PROPH/DIAG INJ IV PUSH: CPT | Mod: 59 | Performed by: EMERGENCY MEDICINE

## 2023-09-01 PROCEDURE — 84443 ASSAY THYROID STIM HORMONE: CPT | Performed by: EMERGENCY MEDICINE

## 2023-09-01 PROCEDURE — 81025 URINE PREGNANCY TEST: CPT | Performed by: EMERGENCY MEDICINE

## 2023-09-01 PROCEDURE — 82310 ASSAY OF CALCIUM: CPT | Performed by: EMERGENCY MEDICINE

## 2023-09-01 PROCEDURE — 84157 ASSAY OF PROTEIN OTHER: CPT | Performed by: STUDENT IN AN ORGANIZED HEALTH CARE EDUCATION/TRAINING PROGRAM

## 2023-09-01 PROCEDURE — 86140 C-REACTIVE PROTEIN: CPT | Performed by: EMERGENCY MEDICINE

## 2023-09-01 PROCEDURE — 96361 HYDRATE IV INFUSION ADD-ON: CPT | Mod: 59 | Performed by: EMERGENCY MEDICINE

## 2023-09-01 PROCEDURE — 250N000011 HC RX IP 250 OP 636: Mod: JZ | Performed by: EMERGENCY MEDICINE

## 2023-09-01 PROCEDURE — A9585 GADOBUTROL INJECTION: HCPCS | Mod: JZ | Performed by: EMERGENCY MEDICINE

## 2023-09-01 PROCEDURE — 89050 BODY FLUID CELL COUNT: CPT | Performed by: STUDENT IN AN ORGANIZED HEALTH CARE EDUCATION/TRAINING PROGRAM

## 2023-09-01 PROCEDURE — 72158 MRI LUMBAR SPINE W/O & W/DYE: CPT | Mod: 26 | Performed by: STUDENT IN AN ORGANIZED HEALTH CARE EDUCATION/TRAINING PROGRAM

## 2023-09-01 PROCEDURE — 62270 DX LMBR SPI PNXR: CPT | Performed by: EMERGENCY MEDICINE

## 2023-09-01 PROCEDURE — 84425 ASSAY OF VITAMIN B-1: CPT | Performed by: STUDENT IN AN ORGANIZED HEALTH CARE EDUCATION/TRAINING PROGRAM

## 2023-09-01 PROCEDURE — 96375 TX/PRO/DX INJ NEW DRUG ADDON: CPT | Mod: 59 | Performed by: EMERGENCY MEDICINE

## 2023-09-01 PROCEDURE — 72157 MRI CHEST SPINE W/O & W/DYE: CPT

## 2023-09-01 PROCEDURE — 255N000002 HC RX 255 OP 636: Mod: JZ | Performed by: EMERGENCY MEDICINE

## 2023-09-01 PROCEDURE — 72156 MRI NECK SPINE W/O & W/DYE: CPT | Mod: 26 | Performed by: STUDENT IN AN ORGANIZED HEALTH CARE EDUCATION/TRAINING PROGRAM

## 2023-09-01 PROCEDURE — 72157 MRI CHEST SPINE W/O & W/DYE: CPT | Mod: 26 | Performed by: STUDENT IN AN ORGANIZED HEALTH CARE EDUCATION/TRAINING PROGRAM

## 2023-09-01 PROCEDURE — 250N000013 HC RX MED GY IP 250 OP 250 PS 637: Performed by: EMERGENCY MEDICINE

## 2023-09-01 PROCEDURE — 250N000009 HC RX 250: Performed by: EMERGENCY MEDICINE

## 2023-09-01 PROCEDURE — 85652 RBC SED RATE AUTOMATED: CPT | Performed by: EMERGENCY MEDICINE

## 2023-09-01 PROCEDURE — 72156 MRI NECK SPINE W/O & W/DYE: CPT

## 2023-09-01 RX ORDER — GABAPENTIN 100 MG/1
100 CAPSULE ORAL 3 TIMES DAILY
Qty: 90 CAPSULE | Refills: 5 | Status: SHIPPED | OUTPATIENT
Start: 2023-09-01 | End: 2023-11-22

## 2023-09-01 RX ORDER — DIPHENHYDRAMINE HYDROCHLORIDE 50 MG/ML
25 INJECTION INTRAMUSCULAR; INTRAVENOUS ONCE
Status: COMPLETED | OUTPATIENT
Start: 2023-09-01 | End: 2023-09-01

## 2023-09-01 RX ORDER — METOCLOPRAMIDE HYDROCHLORIDE 5 MG/ML
10 INJECTION INTRAMUSCULAR; INTRAVENOUS ONCE
Status: COMPLETED | OUTPATIENT
Start: 2023-09-01 | End: 2023-09-01

## 2023-09-01 RX ORDER — GADOBUTROL 604.72 MG/ML
7.5 INJECTION INTRAVENOUS ONCE
Status: COMPLETED | OUTPATIENT
Start: 2023-09-01 | End: 2023-09-01

## 2023-09-01 RX ORDER — LIDOCAINE HYDROCHLORIDE 10 MG/ML
INJECTION, SOLUTION EPIDURAL; INFILTRATION; INTRACAUDAL; PERINEURAL
Status: DISCONTINUED
Start: 2023-09-01 | End: 2023-09-02 | Stop reason: HOSPADM

## 2023-09-01 RX ORDER — ACETAMINOPHEN 325 MG/1
975 TABLET ORAL ONCE
Status: COMPLETED | OUTPATIENT
Start: 2023-09-01 | End: 2023-09-01

## 2023-09-01 RX ORDER — ONDANSETRON 2 MG/ML
4 INJECTION INTRAMUSCULAR; INTRAVENOUS EVERY 30 MIN PRN
Status: DISCONTINUED | OUTPATIENT
Start: 2023-09-01 | End: 2023-09-01

## 2023-09-01 RX ADMIN — METOCLOPRAMIDE 10 MG: 5 INJECTION, SOLUTION INTRAMUSCULAR; INTRAVENOUS at 22:13

## 2023-09-01 RX ADMIN — SODIUM CHLORIDE 1000 ML: 9 INJECTION, SOLUTION INTRAVENOUS at 22:02

## 2023-09-01 RX ADMIN — ACETAMINOPHEN 975 MG: 325 TABLET, FILM COATED ORAL at 22:08

## 2023-09-01 RX ADMIN — GADOBUTROL 6 ML: 604.72 INJECTION INTRAVENOUS at 19:44

## 2023-09-01 RX ADMIN — DIPHENHYDRAMINE HYDROCHLORIDE 25 MG: 50 INJECTION INTRAMUSCULAR; INTRAVENOUS at 22:12

## 2023-09-01 RX ADMIN — LIDOCAINE HYDROCHLORIDE 5 MG: 10 INJECTION, SOLUTION EPIDURAL; INFILTRATION; INTRACAUDAL; PERINEURAL at 22:04

## 2023-09-01 ASSESSMENT — ACTIVITIES OF DAILY LIVING (ADL)
ADLS_ACUITY_SCORE: 33
ADLS_ACUITY_SCORE: 35
ADLS_ACUITY_SCORE: 35
ADLS_ACUITY_SCORE: 33

## 2023-09-01 NOTE — ED PROVIDER NOTES
ED Provider Note  [facility]      History     Chief Complaint   Patient presents with    Numbness     The history is provided by the patient and medical records.     Pam Thompson is a 27 year old female with a history of migraines who presents to the ED for evaluation of paresthesias in bilateral lower extremities.  Patient developed sudden onset of numbness and tingling in her lower extremities 5 days ago.  Patient was evaluated in the ED and underwent MRI brain; complete impression below.  Since being discharged, the patient reports persistence of her symptoms as well as new issues with her balance while standing and ambulating.  She states the numbness in her lower extremities progressed from her knees up to her mid thighs when she sits for prolonged periods of time. Patient reports having chronic lower back pain following the birth of her child but denies experiencing back pain right now.  Patient denies weakness in her extremities.  She denies recent injuries to her back or extremities.  No fevers or recent illnesses. She denies abdominal pain.      MR BRAIN - [facility] (2023)  IMPRESSION:  1.  Two punctate nonenhancing foci of T2 prolongation within the subcortical white matter of the bilateral frontal lobes, of doubtful clinical significance.  2.  Otherwise, unremarkable brain MRI.    Past Medical History  Past Medical History:   Diagnosis Date    History of colposcopy 2022    Migraine      Past Surgical History:   Procedure Laterality Date    APPENDECTOMY       SECTION N/A 10/27/2020    Procedure:  SECTION;  Surgeon: Janet De La Garza MD;  Location:  L+D    COLONOSCOPY N/A 9/15/2022    Procedure: COLONOSCOPY;  Surgeon: Amandeep Maurice MD;  Location:  GI    ESOPHAGOSCOPY, GASTROSCOPY, DUODENOSCOPY (EGD), COMBINED N/A 9/15/2022    Procedure: ESOPHAGOGASTRODUODENOSCOPY, WITH BIOPSY;  Surgeon: Amandeep Maurice MD;   Location: [facility]     EPINEPHrine (EPIPEN 2-RICO) 0.3 MG/0.3ML injection 2-pack  gabapentin (NEURONTIN) 100 MG capsule      Allergies   Allergen Reactions    Watermelon Flavor Anaphylaxis     Lip and face swelling.    Bananas [Banana] Swelling     Swollen lips and tongue    Cefazolin Itching    Cephalosporins     Pineapple      Swollen lips and tongue     Family History  Family History   Problem Relation Age of Onset    Diabetes Father      Social History   Social History     Tobacco Use    Smoking status: Never    Smokeless tobacco: Never   Vaping Use    Vaping Use: Never used   Substance Use Topics    Alcohol use: Not Currently    Drug use: Never      Past medical history, past surgical history, medications, allergies, family history, and social history were reviewed with the patient. No additional pertinent items.      A medically appropriate review of systems was performed with pertinent positives and negatives noted in the HPI, and all other systems negative.    Physical Exam   BP: 105/68  Pulse: 69  Temp: 97.2  F (36.2  C)  Resp: 18  SpO2: 98 %  Physical Exam  Vitals and nursing note reviewed.   Constitutional:       General: She is not in acute distress.     Appearance: Normal appearance. She is well-developed and normal weight. She is not ill-appearing or diaphoretic.   HENT:      Head: Normocephalic and atraumatic.      Nose: Nose normal.      Mouth/Throat:      Mouth: Mucous membranes are moist.   Eyes:      General: No scleral icterus.     Conjunctiva/sclera: Conjunctivae normal.   Cardiovascular:      Rate and Rhythm: Normal rate.   Pulmonary:      Effort: Pulmonary effort is normal. No respiratory distress.      Breath sounds: No stridor.   Abdominal:      General: There is no distension.   Musculoskeletal:         General: No deformity. Normal range of motion.      Cervical back: Normal range of motion and neck supple.   Skin:     General: Skin is warm and dry.   Neurological:      Mental Status: She is  alert and oriented to person, place, and time.      GCS: GCS eye subscore is 4. GCS verbal subscore is 5. GCS motor subscore is 6.      Cranial Nerves: No dysarthria or facial asymmetry.      Motor: No weakness, tremor, atrophy, abnormal muscle tone or seizure activity.      Coordination: Romberg sign positive.      Gait: Gait and tandem walk normal.      Deep Tendon Reflexes: Babinski sign absent on the right side. Babinski sign absent on the left side.      Reflex Scores:       Patellar reflexes are 2+ on the right side and 2+ on the left side.       Achilles reflexes are 2+ on the right side and 2+ on the left side.     Comments: Subjective numbness in bilateral lower extremities from knees down and up to mid thighs after prolonged sitting. Able to differentiate dull and sharp in bilateral lower extremities from foot to proximal thigh.    Psychiatric:         Mood and Affect: Mood normal.         Behavior: Behavior normal.         ED Course, Procedures, & Data    3:16 PM  The patient was seen and [facility]    -Lumbar Puncture    Date/Time: 9/1/2023 9:56 PM    Performed by: Jessica Lawrence MD  Authorized by: Jesscia Lawrence MD    Emergent condition/consent implied      PRE-PROCEDURE DETAILS:     Procedure purpose:  Diagnostic    ANESTHESIA (see MAR for exact dosages):     Anesthesia method:  Local infiltration    Local anesthetic:  Lidocaine 1% w/o epi    PROCEDURE DETAILS:     Lumbar space:  L4-L5 interspace    Patient position:  Sitting    Needle gauge:  20    Needle type:  Spinal needle - Quincke tip    Ultrasound guidance: no      Number of attempts:  1    Fluid appearance:  Clear    Tubes of fluid:  4    Total volume (ml):  10    POST-PROCEDURE:     Puncture site:  Adhesive bandage applied      PROCEDURE    Patient Tolerance:  Patient tolerated the procedure well with no immediate complications (Post LP Headache)                        Results for orders placed or performed during the hospital encounter of 09/01/23   MR Cervical Spine w/o & w Contrast     Status: None    Narrative    MR THORACIC SPINE W/O & W CONTRAST, MR LUMBAR SPINE W/O   W CONTRAST, MR CERVICAL SPINE W/O & W CONTRAST 9/1/2023 7:47 PM    History: numbness and tingling    Comparison:    Technique:    Cervical spine: Sagittal T1-weighted, sagittal T2-weighted, sagittal  STIR, sagittal diffusion-weighted and axial T2-weighted images of the  cervical spine were obtained without the administration of intravenous  contrast. After the administration of intravenous contrast, fat  saturated axial and sagittal T1-weighted images of the cervical spine  were obtained.    Thoracic spine: Sagittal T1-weighted, sagittal T2-weighted, sagittal  STIR, sagittal diffusion weighted and axial T2-weighted images of the  thoracic spine were obtained without the administration of intravenous  contrast. After the administration of intravenous contrast, fat  saturated axial and sagittal T1-weighted images of the thoracic spine  were obtained.    Lumbar spine: Sagittal T1-weighted, sagittal 3-D volumetric  T2-weighted, sagittal STIR , sagittal diffusion-weighted and axial  reconstructed images of the lumbar spine were obtained without the  administration of intravenous contrast. After the administration of  intravenous contrast, axial and sagittal fat-saturated T1-weighted  images of the lumbar spine were obtained.    Contrast dose: 6mL Gadavist    Findings:    Cervical:  The cervical vertebrae appear normally aligned.  Bone  marrow signal intensity appears normal.  There is no abnormal signal  within the cervical spinal cord.  On a level by level basis:    C2-3: No spinal canal or neural foraminal stenosis.  C3-4: Mild disc bulge without spinal canal or neural foraminal  stenosis.  C4-5: Mild disc bulge without spinal canal or neural foraminal  stenosis.  C5-6: Mild disc bulge without  spinal canal or neural foraminal  stenosis.  C6-7: No spinal canal or neural foraminal stenosis.  C7-T1:No spinal canal or neural foraminal stenosis.    No abnormal enhancement of the paraspinous tissues, vertebral column,  or within the spinal canal.    Thoracic:  The thoracic vertebrae are in normal alignment.  Bone  marrow signal intensity appears normal.  There is no abnormal signal  within the thoracic spinal cord. No spinal canal or neural foraminal  stenosis. No abnormal enhancement of the paraspinous tissues,  vertebral column, or within the spinal canal.    Lumbar: There are 5 lumbar-type vertebrae assumed for the purposes of  this dictation. The tip of the conus medullaris is at L1.  The lumbar  vertebral column appears normally aligned.  Bone marrow signal  intensity appears normal. There is no disc height narrowing at any  level.    No spinal canal or foraminal stenosis at any level.     Normal paraspinous soft tissues.. No abnormal enhancement of the  paraspinous tissues, vertebral column, or within the spinal canal.      Impression    Impression: Subtle disc bulges at C3-4, C4-5 and C5-6. No spinal canal  or neural foraminal stenosis at any level. Normal cervical and  thoracic spinal cord signal. No suspicious enhancement in the spinal  neuraxis to explain symptoms.    MIC BRAY MD         SYSTEM ID:  [id number]   MR Lumbar Spine w/o & w Contrast     Status: None    Narrative    MR THORACIC SPINE W/O & W CONTRAST, MR LUMBAR SPINE W/O   W CONTRAST, MR CERVICAL SPINE W/O & W CONTRAST 9/1/2023 7:47 PM    History: numbness and tingling    Comparison:    Technique:    Cervical spine: Sagittal T1-weighted, sagittal T2-weighted, sagittal  STIR, sagittal diffusion-weighted and axial T2-weighted images of the  cervical spine were obtained without the administration of intravenous  contrast. After the administration of intravenous contrast, fat  saturated axial and sagittal T1-weighted images of the cervical  spine  were obtained.    Thoracic spine: Sagittal T1-weighted, sagittal T2-weighted, sagittal  STIR, sagittal diffusion weighted and axial T2-weighted images of the  thoracic spine were obtained without the administration of intravenous  contrast. After the administration of intravenous contrast, fat  saturated axial and sagittal T1-weighted images of the thoracic spine  were obtained.    Lumbar spine: Sagittal T1-weighted, sagittal 3-D volumetric  T2-weighted, sagittal STIR , sagittal diffusion-weighted and axial  reconstructed images of the lumbar spine were obtained without the  administration of intravenous contrast. After the administration of  intravenous contrast, axial and sagittal fat-saturated T1-weighted  images of the lumbar spine were obtained.    Contrast dose: 6mL Gadavist    Findings:    Cervical:  The cervical vertebrae appear normally aligned.  Bone  marrow signal intensity appears normal.  There is no abnormal signal  within the cervical spinal cord.  On a level by level basis:    C2-3: No spinal canal or neural foraminal stenosis.  C3-4: Mild disc bulge without spinal canal or neural foraminal  stenosis.  C4-5: Mild disc bulge without spinal canal or neural foraminal  stenosis.  C5-6: Mild disc bulge without spinal canal or neural foraminal  stenosis.  C6-7: No spinal canal or neural foraminal stenosis.  C7-T1:No spinal canal or neural foraminal stenosis.    No abnormal enhancement of the paraspinous tissues, vertebral column,  or within the spinal canal.    Thoracic:  The thoracic vertebrae are in normal alignment.  Bone  marrow signal intensity appears normal.  There is no abnormal signal  within the thoracic spinal cord. No spinal canal or neural foraminal  stenosis. No abnormal enhancement of the paraspinous tissues,  vertebral column, or within the spinal canal.    Lumbar: There are 5 lumbar-type vertebrae assumed for the purposes of  this dictation. The tip of the conus medullaris is at L1.   The lumbar  vertebral column appears normally aligned.  Bone marrow signal  intensity appears normal. There is no disc height narrowing at any  level.    No spinal canal or foraminal stenosis at any level.     Normal paraspinous soft tissues.. No abnormal enhancement of the  paraspinous tissues, vertebral column, or within the spinal canal.      Impression    Impression: Subtle disc bulges at C3-4, C4-5 and C5-6. No spinal canal  or neural foraminal stenosis at any level. Normal cervical and  thoracic spinal cord signal. No suspicious enhancement in the spinal  neuraxis to explain symptoms.    MIC BRAY MD         SYSTEM ID:  [id number]   MR Thoracic Spine w/o & w Contrast     Status: None    Narrative    MR THORACIC SPINE W/O & W CONTRAST, MR LUMBAR SPINE W/O   W CONTRAST, MR CERVICAL SPINE W/O & W CONTRAST 9/1/2023 7:47 PM    History: numbness and tingling    Comparison:    Technique:    Cervical spine: Sagittal T1-weighted, sagittal T2-weighted, sagittal  STIR, sagittal diffusion-weighted and axial T2-weighted images of the  cervical spine were obtained without the administration of intravenous  contrast. After the administration of intravenous contrast, fat  saturated axial and sagittal T1-weighted images of the cervical spine  were obtained.    Thoracic spine: Sagittal T1-weighted, sagittal T2-weighted, sagittal  STIR, sagittal diffusion weighted and axial T2-weighted images of the  thoracic spine were obtained without the administration of intravenous  contrast. After the administration of intravenous contrast, fat  saturated axial and sagittal T1-weighted images of the thoracic spine  were obtained.    Lumbar spine: Sagittal T1-weighted, sagittal 3-D volumetric  T2-weighted, sagittal STIR , sagittal diffusion-weighted and axial  reconstructed images of the lumbar spine were obtained without the  administration of intravenous contrast. After the administration of  intravenous contrast, axial and sagittal  fat-saturated T1-weighted  images of the lumbar spine were obtained.    Contrast dose: 6mL Gadavist    Findings:    Cervical:  The cervical vertebrae appear normally aligned.  Bone  marrow signal intensity appears normal.  There is no abnormal signal  within the cervical spinal cord.  On a level by level basis:    C2-3: No spinal canal or neural foraminal stenosis.  C3-4: Mild disc bulge without spinal canal or neural foraminal  stenosis.  C4-5: Mild disc bulge without spinal canal or neural foraminal  stenosis.  C5-6: Mild disc bulge without spinal canal or neural foraminal  stenosis.  C6-7: No spinal canal or neural foraminal stenosis.  C7-T1:No spinal canal or neural foraminal stenosis.    No abnormal enhancement of the paraspinous tissues, vertebral column,  or within the spinal canal.    Thoracic:  The thoracic vertebrae are in normal alignment.  Bone  marrow signal intensity appears normal.  There is no abnormal signal  within the thoracic spinal cord. No spinal canal or neural foraminal  stenosis. No abnormal enhancement of the paraspinous tissues,  vertebral column, or within the spinal canal.    Lumbar: There are 5 lumbar-type vertebrae assumed for the purposes of  this dictation. The tip of the conus medullaris is at L1.  The lumbar  vertebral column appears normally aligned.  Bone marrow signal  intensity appears normal. There is no disc height narrowing at any  level.    No spinal canal or foraminal stenosis at any level.     Normal paraspinous soft tissues.. No abnormal enhancement of the  paraspinous tissues, vertebral column, or within the spinal canal.      Impression    Impression: Subtle disc bulges at C3-4, C4-5 and C5-6. No spinal canal  or neural foraminal stenosis at any level. Normal cervical and  thoracic spinal cord signal. No suspicious enhancement in the spinal  neuraxis to explain symptoms.    MIC BRAY MD         SYSTEM ID:  [id number]   Basic metabolic panel     Status: Normal    Result Value Ref Range    Sodium 141 136 - 145 mmol/L    Potassium 4.1 3.4 - 5.3 mmol/L    Chloride 105 98 - 107 mmol/L    Carbon Dioxide (CO2) 24 22 - 29 mmol/L    Anion Gap 12 7 - 15 mmol/L    Urea Nitrogen 11.8 6.0 - 20.0 mg/dL    Creatinine 0.65 0.51 - 0.95 mg/dL    Calcium 9.8 8.6 - 10.0 mg/dL    Glucose 85 70 - 99 mg/dL    GFR Estimate >90 >60 mL/min/1.73m2   HCG qualitative urine (UPT)     Status: Normal   Result Value Ref Range    hCG Urine Qualitative Negative Negative   Erythrocyte sedimentation rate auto     Status: Normal   Result Value Ref Range    Erythrocyte Sedimentation Rate 11 0 - 20 mm/hr   CRP inflammation     Status: Normal   Result Value Ref Range    CRP Inflammation <3.00 <5.00 mg/L   TSH with free T4 reflex     Status: Normal   Result Value Ref Range    TSH 0.93 0.30 - 4.20 uIU/mL   CBC with platelets and differential     Status: None   Result Value Ref Range    WBC Count 9.2 4.0 - 11.0 10e3/uL    RBC Count 4.66 3.80 - 5.20 10e6/uL    Hemoglobin 15.1 11.7 - 15.7 g/dL    Hematocrit 44.1 35.0 - 47.0 %    MCV 95 78 - 100 fL    MCH 32.4 26.5 - 33.0 pg    MCHC 34.2 31.5 - 36.5 g/dL    RDW 12.3 10.0 - 15.0 %    Platelet Count 378 150 - 450 10e3/uL    % Neutrophils 58 %    % Lymphocytes 34 %    % Monocytes 4 %    % Eosinophils 3 %    % Basophils 1 %    % Immature Granulocytes 0 %    NRBCs per 100 WBC 0 <1 /100    Absolute Neutrophils 5.5 1.6 - 8.3 10e3/uL    Absolute Lymphocytes 3.1 0.8 - 5.3 10e3/uL    Absolute Monocytes 0.3 0.0 - 1.3 10e3/uL    Absolute Eosinophils 0.3 0.0 - 0.7 10e3/uL    Absolute Basophils 0.1 0.0 - 0.2 10e3/uL    Absolute Immature Granulocytes 0.0 <=0.4 10e3/uL    Absolute NRBCs 0.0 10e3/uL   Glucose CSF:     Status: Normal   Result Value Ref Range    Glucose CSF 52 40 - 70 mg/dL    Narrative    CSF glucose concentrations are about 60 percent of normal plasma glucose.   Protein total CSF:     Status: Normal   Result Value Ref Range    Protein total CSF 21.7 15.0 - 45.0 mg/dL    CBC with platelets differential     Status: None    Narrative    The following orders were created for panel order CBC with platelets differential.  Procedure                               Abnormality         Status                     ---------                               -----------         ------                     CBC with platelets and d...[[id number]]                      Final result                 Please view results for these tests on the individual orders.   CSF Cell Count with Differential:     Status: None (In process)    Narrative    The following orders were created for panel order CSF Cell Count with Differential:.  Procedure                               Abnormality         Status                     ---------                               -----------         ------                     Cell Count CSF[[id number]]                                   In process                   Please view results for these tests on the individual orders.     Medications   lidocaine (PF) (XYLOCAINE) 1 % injection (has no administration in time range)   0.9% sodium chloride BOLUS (has no administration in time range)   0.9% sodium chloride BOLUS (has no administration in time range)   gadobutrol (GADAVIST) injection 7.5 mL (6 mLs Intravenous $Given 9/1/23 1944)   lidocaine 1 % 5 mg (5 mg Intradermal $Given 9/1/23 2204)   0.9% sodium chloride BOLUS (1,000 mLs Intravenous $New Bag 9/1/23 2202)   acetaminophen (TYLENOL) tablet 975 mg (975 mg Oral $Given 9/1/23 2208)   metoclopramide (REGLAN) injection 10 mg (10 mg Intravenous $Given 9/1/23 2213)   diphenhydrAMINE (BENADRYL) injection 25 mg (25 mg Intravenous $Given 9/1/23 2212)     Labs Ordered and Resulted from Time of ED Arrival to Time of ED Departure   BASIC METABOLIC PANEL - Normal       Result Value    Sodium 141      Potassium 4.1      Chloride 105      Carbon Dioxide (CO2) 24      Anion Gap 12      Urea Nitrogen 11.8      Creatinine 0.65      Calcium 9.8       Glucose 85      GFR Estimate >90     HCG QUALITATIVE URINE - Normal    hCG Urine Qualitative Negative     ERYTHROCYTE SEDIMENTATION RATE AUTO - Normal    Erythrocyte Sedimentation Rate 11     CRP INFLAMMATION - Normal    CRP Inflammation <3.00     TSH WITH FREE T4 REFLEX - Normal    TSH 0.93     GLUCOSE CSF - Normal    Glucose CSF 52     PROTEIN TOTAL CSF - Normal    Protein total CSF 21.7     CBC WITH PLATELETS AND DIFFERENTIAL    WBC Count 9.2      RBC Count 4.66      Hemoglobin 15.1      Hematocrit 44.1      MCV 95      MCH 32.4      MCHC 34.2      RDW 12.3      Platelet Count 378      % Neutrophils 58      % Lymphocytes 34      % Monocytes 4      % Eosinophils 3      % Basophils 1      % Immature Granulocytes 0      NRBCs per 100 WBC 0      Absolute Neutrophils 5.5      Absolute Lymphocytes 3.1      Absolute Monocytes 0.3      Absolute Eosinophils 0.3      Absolute Basophils 0.1      Absolute Immature Granulocytes 0.0      Absolute NRBCs 0.0     VITAMIN B1 WHOLE BLOOD   LABORATORY MISCELLANEOUS ORDER   CELL COUNT CSF   CELL COUNT WITH DIFFERENTIAL CSF     MR Lumbar Spine w/o & w Contrast   Final Result   Impression: Subtle disc bulges at C3-4, C4-5 and C5-6. No spinal canal   or neural foraminal stenosis at any level. Normal cervical and   thoracic spinal cord signal. No suspicious enhancement in the spinal   neuraxis to explain symptoms.      MIC BRAY MD            SYSTEM ID:  [id number]      MR Thoracic Spine w/o & w Contrast   Final Result   Impression: Subtle disc bulges at C3-4, C4-5 and C5-6. No spinal canal   or neural foraminal stenosis at any level. Normal cervical and   thoracic spinal cord signal. No suspicious enhancement in the spinal   neuraxis to explain symptoms.      MIC BRAY MD            SYSTEM ID:  [id number]      MR Cervical Spine w/o & w Contrast   Final Result   Impression: Subtle disc bulges at C3-4, C4-5 and C5-6. No spinal canal   or neural foraminal stenosis at any level. Normal  cervical and   thoracic spinal cord signal. No suspicious enhancement in the spinal   neuraxis to explain symptoms.      MIC BRAY MD            SYSTEM ID:  [id number]             Critical care was not performed.     Medical Decision Making  The patient's presentation was of high complexity (an acute health issue posing potential threat to life or bodily function).    The patient's evaluation involved:  an assessment requiring an independent historian (see separate area of note for details)  review of external note(s) from 2 sources (see separate area of note for details)  review of 3+ test result(s) ordered prior to this encounter (see separate area of note for details)  ordering and/or review of 3+ test(s) in this encounter (see separate area of note for details)  discussion of management or test interpretation with another health professional (see separate area of note for details)    The patient's management necessitated moderate risk (prescription drug management including medications given in the ED), moderate risk (a decision regarding minor procedure (lumbar puncture) with risk factors of none), high risk (a parenteral controlled substance), high risk (a decision regarding hospitalization), and further care after sign-out to Dr. Blanca (see their note for further management).    Assessment & Plan    Pam Thompson is a 27 year old female with a history of migraines who presents to the ED for evaluation of paresthesias in bilateral lower extremities.     Ddx: MS, Tabes dorsalis, vitamin deficiency, other demyelinating disorder    Patient with progressive sxs following recent ED eval with Neuro consult and Brain MRI. Reviewed MRI brain and Neuro consult note. On examination today, patient has abnormal Romberg. No objective sensory deficits on my examination. Gait intact. 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 His colleague, one of our  Transplant surgeons, called in on patient's behalf reporting that she had been speaking with patient's PMD and an outside neurosurgeon throughout the day discussing concern for MS and desire for admission, MRI of total spine, and possible LP. Neuro consulted and informed of this concern.     After their evaluation they recommended proceeding with MRI with and without contrast of the total spine.  Depending on the results of the MRI they will consider performing an LP for further diagnostic work-up.  Patient's labs all resulted normal.  MRI of the spine resulted without suspicious enhancement in the spinal neural axis to explain symptoms.  There was note of subtle disc bulges at multiple levels in the cervical spine but no spinal canal or neural foraminal stenosis at any level.  Normal spinal cord signal.  Neurology recommended proceeding with a lumbar puncture.  If the results are abnormal they will admit to start IVIG.  If they are normal they anticipate discharging the patient.    Lumbar puncture performed as above.  Patient developed a severe post LP headache which improved with IV fluids, Benadryl, Reglan.  CSF protein resulted within normal range.  Awaiting CSF cell count with differential.  Neurology will speak with lab about expediting the cell count and come speak with the patient and her  following the results.  Likely discharge.    Patient signed out to Dr. Blanca at shift change. Dispo pending CSF cell count and Neuro recs. Anticipate Discharge. Please see addendum for results of work up and remaining management.        I have reviewed the nursing notes. I have reviewed the findings, diagnosis, plan and need for follow up with the patient.    New Prescriptions    No medications on file       Final diagnoses:   Paresthesias     Kyrie BARNETT, am serving as a trained medical scribe to document services personally performed by Jessica Lawrence MD, based on the provider's statements to me.     Jessica BARNETT  MD Howard, was physically present and have reviewed and verified the accuracy of this note documented by Kyrie You.    Jessica Lawrence MD  [facility] EMERGENCY DEPARTMENT  9/1/2023     Jessica Lawrence MD  09/02/23 0009

## 2023-09-01 NOTE — CONSULTS
[facility]  Neurology Consultation    Patient Name:  Pam Thompson  MRN:  [id number]    :  1995  Date of Service:  2023  Primary care provider:  [place], [facility]      Neurology consultation service was asked to see Pam Thompson by Dr. Lawrence to evaluate: numbness.      History of Present Illness:   27 year old female without profound PMH who presents with 5 day hx of numbness/tingling.  It seems to start in her feet and now is at times up to her knees and at times involving her hands.  She is not weak.  She does note some occasional subjective balance issues.  She notes no bowel or bladder changes.  She does not seem particularly anxious.  Discussed her history with the patient and her  who is at bedside and is a medical doctor from [facility].  They moved here ~4 years ago.   She notes no prodromal illness.     No h/o of neurological attacks that could be construed as demyelinating.      They saw Dr. Okeefe as an outpatient recently and initial work up including MRI brain and blood work was unrevealing.  She feels it has worsened further prompting further evaluation.        PMH  Past Medical History:   Diagnosis Date    History of colposcopy 2022    Migraine      Past Surgical History:   Procedure Laterality Date    APPENDECTOMY       SECTION N/A 10/27/2020    Procedure:  SECTION;  Surgeon: Janet De La Garza MD;  Location:  L+D    COLONOSCOPY N/A 9/15/2022    Procedure: COLONOSCOPY;  Surgeon: Amandeep Maurice MD;  Location:  GI    ESOPHAGOSCOPY, GASTROSCOPY, DUODENOSCOPY (EGD), COMBINED N/A 9/15/2022    Procedure: ESOPHAGOGASTRODUODENOSCOPY, WITH BIOPSY;  Surgeon: Amandeep Maurice MD;  Location:  GI       Medications   (Not in a hospital admission)      Allergies  Allergies   Allergen Reactions    Watermelon Flavor Anaphylaxis     Lip and face swelling.    Bananas [Banana]  Swelling     Swollen lips and tongue    Cefazolin Itching    Cephalosporins     Pineapple      Swollen lips and tongue       Social History  Social History     Tobacco Use    Smoking status: Never    Smokeless tobacco: Never   Substance Use Topics    Alcohol use: Not Currently           Physical Examination   Vitals: /68   Pulse 69   Temp 97.2  F (36.2  C) (Oral)   Resp 18   SpO2 98%     Mental status: Awake, alert, attentive, oriented to self, time, place, and circumstance. Language is fluent and coherent with intact comprehension of complex commands, naming and repetition.  Cranial nerves: VFF, PERRL, conjugate gaze, EOMI, facial sensation intact, face symmetric, shoulder shrug strong, tongue/uvula midline, no dysarthria.   Motor: Normal bulk and tone. No abnormal movements. 5/5 strength in upper and lower extremities.  Reflexes: 2+symmetric biceps, brachioradialis, triceps, patellae, and achilles. toes down-going.  Sensory: Intact to light touch, pin, vibration, and proprioception  Coordination: FNF without ataxia or dysmetria.   Gait: Normal width, stride length, turn, and arm swing. Heel, toe, and tandem walk intact. Felt a little unsteady during tandem but completed without issue.     Investigations   Lab Results   Component Value Date    A1C 5.3 06/16/2022    A1C 5.3 07/29/2021     TSH   Date Value Ref Range Status   10/01/2019 1.01 see scan mIU/L Final     NARAYAN negative    Narrative & Impression   EXAM: MR BRAIN W/O and W CONTRAST  LOCATION: [facility]  DATE: 8/30/2023     INDICATION: Fluctuating paresthesias rule out MS; Headache; Neurologic deficit, non traumatic; Sensory loss; Neurologic deficit onset > 24 hours; No known automatically detected potential contraindications to imaging  COMPARISON: None.  CONTRAST: 5 mL Gadavist  TECHNIQUE: Routine multiplanar multisequence head MRI without and with intravenous contrast.     FINDINGS:  INTRACRANIAL CONTENTS: No  acute/subacute infarct, acute hemorrhage, or extra-axial fluid collection. No intracranial mass or pathologic enhancement. Incidentally noted small right frontal lobe developmental venous anomaly. Two punctate foci of T2   prolongation within the bilateral frontal lobe subcortical white matter without associated restricted diffusion, mass effect, or vasogenic edema (series 10 images 84 and 87). Unremarkable parenchymal signal intensity within the remainder of the brain.   Normal ventricles and sulci for age. Normal position of the cerebellar tonsils.      SELLA: Within technique limitations, no gross abnormality.     OSSEOUS STRUCTURES/SOFT TISSUES: Normal marrow signal. The major intracranial vascular flow voids are maintained.      ORBITS: Within technique limitations, no significant abnormality.      SINUSES/MASTOIDS: Trace bilateral ethmoid sinus mucosal thickening without air-fluid levels. No mastoid effusion.                                                                        IMPRESSION:  1.  Two punctate nonenhancing foci of T2 prolongation within the subcortical white matter of the bilateral frontal lobes, of doubtful clinical significance.  2.  Otherwise, unremarkable brain MRI.       I personally reviewed the above images and reports.  The imaging represents to me unrevealing MRI brain--T2 hyperintenisities noted are tiny and not of clear clinical significance       Impression/Recommendations  Ms. Thompson is a pleasant 27 year old female without profound PMH who presents with 5 day hx of numbness/tingling.  It seems to start in her feet and now is at times up to her knees and at times involving her hands.  She is not weak.    Her neurological examination is unrevealing including normal sensory exam and preserved reflexes.   MRI brain as above is unrevealing.     We discussed I see/hear nothing that is clearly suggestive of MS per their concern.   Her story is potentially suspicious for early AIDP given  ascending sensory changes but her exam including reflexes is normal at this time. MRIs could show inflammatory changes at nerve roots potentially in support of this differential and will further disprove anything at the level of the cord.  If an early form of this diagnosis can be tricky as it can be too soon for objective findings.  We discussed it is possible we will not find a cohesive explanation for sensory changes and if so the prognosis would be good.  We need more data at this point    -MRI C/T/L spine w/w/o  --TSH, B1 in addition to previous lab work  --Further recommendations pending completion of above.     Thank you for involving Neurology in the care of Pam Thompson.  Please do not hesitate to call with questions/concerns (consult pager [phone]).      Nik Lord MD   of Neurology  [facility]

## 2023-09-01 NOTE — ED TRIAGE NOTES
"Presents with worsening numbness/tingling of BLE extending up to mid thigh.   Was seen here on 8/27, had brain MRI 8/30 and saw neurology on the same day.   Pt states it feels like \"my legs are sleeping, numb and tingling.\"          Triage Assessment       Row Name 09/01/23 1407       Triage Assessment (Adult)    Airway WDL WDL       Respiratory WDL    Respiratory WDL WDL       Skin Circulation/Temperature WDL    Skin Circulation/Temperature WDL WDL       Cardiac WDL    Cardiac WDL WDL       Peripheral/Neurovascular WDL    Peripheral Neurovascular WDL X;neurovascular assessment lower       LLE Neurovascular Assessment    Sensation LLE tingling present;numbness present       RLE Neurovascular Assessment    Sensation RLE numbness present;tingling present       Cognitive/Neuro/Behavioral WDL    Cognitive/Neuro/Behavioral WDL WDL                    "

## 2023-09-02 LAB
APPEARANCE CSF: CLEAR
COLOR CSF: COLORLESS
RBC # CSF MANUAL: 9 /UL (ref 0–2)
TUBE # CSF: ABNORMAL
WBC # CSF MANUAL: 3 /UL (ref 0–5)

## 2023-09-02 PROCEDURE — 258N000003 HC RX IP 258 OP 636: Performed by: EMERGENCY MEDICINE

## 2023-09-02 PROCEDURE — 96361 HYDRATE IV INFUSION ADD-ON: CPT | Performed by: EMERGENCY MEDICINE

## 2023-09-02 RX ADMIN — SODIUM CHLORIDE 1000 ML: 9 INJECTION, SOLUTION INTRAVENOUS at 00:13

## 2023-09-02 ASSESSMENT — ACTIVITIES OF DAILY LIVING (ADL): ADLS_ACUITY_SCORE: 35

## 2023-09-02 NOTE — DISCHARGE INSTRUCTIONS
Please follow-up with neurology outpatient as you have planned    Return to the emergency department if you develop worsening numbness, weakness, altered mental status or for any further concerns

## 2023-09-02 NOTE — ED NOTES
Emergency Department Patient Sign-out       Brief HPI:  This is a 27 year old female signed out to me by Dr. Lawrence .  See initial ED Provider note for details of the presentation.            Significant Events prior to my assuming care: Pt with paresthesias. LP studies and neurology consult pending.      Exam:   Patient Vitals for the past 24 hrs:   BP Temp Temp src Pulse Resp SpO2   09/01/23 1400 105/68 97.2  F (36.2  C) Oral 69 18 98 %           ED RESULTS:   Results for orders placed or performed during the hospital encounter of 09/01/23 (from the past 24 hour(s))   HCG qualitative urine (UPT)     Status: Normal    Collection Time: 09/01/23  4:15 PM   Result Value Ref Range    hCG Urine Qualitative Negative Negative   Basic metabolic panel     Status: Normal    Collection Time: 09/01/23  4:20 PM   Result Value Ref Range    Sodium 141 136 - 145 mmol/L    Potassium 4.1 3.4 - 5.3 mmol/L    Chloride 105 98 - 107 mmol/L    Carbon Dioxide (CO2) 24 22 - 29 mmol/L    Anion Gap 12 7 - 15 mmol/L    Urea Nitrogen 11.8 6.0 - 20.0 mg/dL    Creatinine 0.65 0.51 - 0.95 mg/dL    Calcium 9.8 8.6 - 10.0 mg/dL    Glucose 85 70 - 99 mg/dL    GFR Estimate >90 >60 mL/min/1.73m2   CBC with platelets differential     Status: None    Collection Time: 09/01/23  4:20 PM    Narrative    The following orders were created for panel order CBC with platelets differential.  Procedure                               Abnormality         Status                     ---------                               -----------         ------                     CBC with platelets and d...[[id number]]                      Final result                 Please view results for these tests on the individual orders.   Erythrocyte sedimentation rate auto     Status: Normal    Collection Time: 09/01/23  4:20 PM   Result Value Ref Range    Erythrocyte Sedimentation Rate 11 0 - 20 mm/hr   CRP inflammation     Status: Normal    Collection Time: 09/01/23  4:20 PM    Result Value Ref Range    CRP Inflammation <3.00 <5.00 mg/L   TSH with free T4 reflex     Status: Normal    Collection Time: 09/01/23  4:20 PM   Result Value Ref Range    TSH 0.93 0.30 - 4.20 uIU/mL   CBC with platelets and differential     Status: None    Collection Time: 09/01/23  4:20 PM   Result Value Ref Range    WBC Count 9.2 4.0 - 11.0 10e3/uL    RBC Count 4.66 3.80 - 5.20 10e6/uL    Hemoglobin 15.1 11.7 - 15.7 g/dL    Hematocrit 44.1 35.0 - 47.0 %    MCV 95 78 - 100 fL    MCH 32.4 26.5 - 33.0 pg    MCHC 34.2 31.5 - 36.5 g/dL    RDW 12.3 10.0 - 15.0 %    Platelet Count 378 150 - 450 10e3/uL    % Neutrophils 58 %    % Lymphocytes 34 %    % Monocytes 4 %    % Eosinophils 3 %    % Basophils 1 %    % Immature Granulocytes 0 %    NRBCs per 100 WBC 0 <1 /100    Absolute Neutrophils 5.5 1.6 - 8.3 10e3/uL    Absolute Lymphocytes 3.1 0.8 - 5.3 10e3/uL    Absolute Monocytes 0.3 0.0 - 1.3 10e3/uL    Absolute Eosinophils 0.3 0.0 - 0.7 10e3/uL    Absolute Basophils 0.1 0.0 - 0.2 10e3/uL    Absolute Immature Granulocytes 0.0 <=0.4 10e3/uL    Absolute NRBCs 0.0 10e3/uL   MR Cervical Spine w/o & w Contrast     Status: None    Collection Time: 09/01/23  7:43 PM    Narrative    MR THORACIC SPINE W/O & W CONTRAST, MR LUMBAR SPINE W/O   W CONTRAST, MR CERVICAL SPINE W/O & W CONTRAST 9/1/2023 7:47 PM    History: numbness and tingling    Comparison:    Technique:    Cervical spine: Sagittal T1-weighted, sagittal T2-weighted, sagittal  STIR, sagittal diffusion-weighted and axial T2-weighted images of the  cervical spine were obtained without the administration of intravenous  contrast. After the administration of intravenous contrast, fat  saturated axial and sagittal T1-weighted images of the cervical spine  were obtained.    Thoracic spine: Sagittal T1-weighted, sagittal T2-weighted, sagittal  STIR, sagittal diffusion weighted and axial T2-weighted images of the  thoracic spine were obtained without the administration of  intravenous  contrast. After the administration of intravenous contrast, fat  saturated axial and sagittal T1-weighted images of the thoracic spine  were obtained.    Lumbar spine: Sagittal T1-weighted, sagittal 3-D volumetric  T2-weighted, sagittal STIR , sagittal diffusion-weighted and axial  reconstructed images of the lumbar spine were obtained without the  administration of intravenous contrast. After the administration of  intravenous contrast, axial and sagittal fat-saturated T1-weighted  images of the lumbar spine were obtained.    Contrast dose: 6mL Gadavist    Findings:    Cervical:  The cervical vertebrae appear normally aligned.  Bone  marrow signal intensity appears normal.  There is no abnormal signal  within the cervical spinal cord.  On a level by level basis:    C2-3: No spinal canal or neural foraminal stenosis.  C3-4: Mild disc bulge without spinal canal or neural foraminal  stenosis.  C4-5: Mild disc bulge without spinal canal or neural foraminal  stenosis.  C5-6: Mild disc bulge without spinal canal or neural foraminal  stenosis.  C6-7: No spinal canal or neural foraminal stenosis.  C7-T1:No spinal canal or neural foraminal stenosis.    No abnormal enhancement of the paraspinous tissues, vertebral column,  or within the spinal canal.    Thoracic:  The thoracic vertebrae are in normal alignment.  Bone  marrow signal intensity appears normal.  There is no abnormal signal  within the thoracic spinal cord. No spinal canal or neural foraminal  stenosis. No abnormal enhancement of the paraspinous tissues,  vertebral column, or within the spinal canal.    Lumbar: There are 5 lumbar-type vertebrae assumed for the purposes of  this dictation. The tip of the conus medullaris is at L1.  The lumbar  vertebral column appears normally aligned.  Bone marrow signal  intensity appears normal. There is no disc height narrowing at any  level.    No spinal canal or foraminal stenosis at any level.     Normal  paraspinous soft tissues.. No abnormal enhancement of the  paraspinous tissues, vertebral column, or within the spinal canal.      Impression    Impression: Subtle disc bulges at C3-4, C4-5 and C5-6. No spinal canal  or neural foraminal stenosis at any level. Normal cervical and  thoracic spinal cord signal. No suspicious enhancement in the spinal  neuraxis to explain symptoms.    MIC BRAY MD         SYSTEM ID:  [id number]   MR Thoracic Spine w/o & w Contrast     Status: None    Collection Time: 09/01/23  7:47 PM    Narrative    MR THORACIC SPINE W/O & W CONTRAST, MR LUMBAR SPINE W/O   W CONTRAST, MR CERVICAL SPINE W/O & W CONTRAST 9/1/2023 7:47 PM    History: numbness and tingling    Comparison:    Technique:    Cervical spine: Sagittal T1-weighted, sagittal T2-weighted, sagittal  STIR, sagittal diffusion-weighted and axial T2-weighted images of the  cervical spine were obtained without the administration of intravenous  contrast. After the administration of intravenous contrast, fat  saturated axial and sagittal T1-weighted images of the cervical spine  were obtained.    Thoracic spine: Sagittal T1-weighted, sagittal T2-weighted, sagittal  STIR, sagittal diffusion weighted and axial T2-weighted images of the  thoracic spine were obtained without the administration of intravenous  contrast. After the administration of intravenous contrast, fat  saturated axial and sagittal T1-weighted images of the thoracic spine  were obtained.    Lumbar spine: Sagittal T1-weighted, sagittal 3-D volumetric  T2-weighted, sagittal STIR , sagittal diffusion-weighted and axial  reconstructed images of the lumbar spine were obtained without the  administration of intravenous contrast. After the administration of  intravenous contrast, axial and sagittal fat-saturated T1-weighted  images of the lumbar spine were obtained.    Contrast dose: 6mL Gadavist    Findings:    Cervical:  The cervical vertebrae appear normally aligned.   Bone  marrow signal intensity appears normal.  There is no abnormal signal  within the cervical spinal cord.  On a level by level basis:    C2-3: No spinal canal or neural foraminal stenosis.  C3-4: Mild disc bulge without spinal canal or neural foraminal  stenosis.  C4-5: Mild disc bulge without spinal canal or neural foraminal  stenosis.  C5-6: Mild disc bulge without spinal canal or neural foraminal  stenosis.  C6-7: No spinal canal or neural foraminal stenosis.  C7-T1:No spinal canal or neural foraminal stenosis.    No abnormal enhancement of the paraspinous tissues, vertebral column,  or within the spinal canal.    Thoracic:  The thoracic vertebrae are in normal alignment.  Bone  marrow signal intensity appears normal.  There is no abnormal signal  within the thoracic spinal cord. No spinal canal or neural foraminal  stenosis. No abnormal enhancement of the paraspinous tissues,  vertebral column, or within the spinal canal.    Lumbar: There are 5 lumbar-type vertebrae assumed for the purposes of  this dictation. The tip of the conus medullaris is at L1.  The lumbar  vertebral column appears normally aligned.  Bone marrow signal  intensity appears normal. There is no disc height narrowing at any  level.    No spinal canal or foraminal stenosis at any level.     Normal paraspinous soft tissues.. No abnormal enhancement of the  paraspinous tissues, vertebral column, or within the spinal canal.      Impression    Impression: Subtle disc bulges at C3-4, C4-5 and C5-6. No spinal canal  or neural foraminal stenosis at any level. Normal cervical and  thoracic spinal cord signal. No suspicious enhancement in the spinal  neuraxis to explain symptoms.    MIC BRAY MD         SYSTEM ID:  [id number]   MR Lumbar Spine w/o & w Contrast     Status: None    Collection Time: 09/01/23  7:47 PM    Narrative    MR THORACIC SPINE W/O & W CONTRAST, MR LUMBAR SPINE W/O   W CONTRAST, MR CERVICAL SPINE W/O & W CONTRAST 9/1/2023 7:47  PM    History: numbness and tingling    Comparison:    Technique:    Cervical spine: Sagittal T1-weighted, sagittal T2-weighted, sagittal  STIR, sagittal diffusion-weighted and axial T2-weighted images of the  cervical spine were obtained without the administration of intravenous  contrast. After the administration of intravenous contrast, fat  saturated axial and sagittal T1-weighted images of the cervical spine  were obtained.    Thoracic spine: Sagittal T1-weighted, sagittal T2-weighted, sagittal  STIR, sagittal diffusion weighted and axial T2-weighted images of the  thoracic spine were obtained without the administration of intravenous  contrast. After the administration of intravenous contrast, fat  saturated axial and sagittal T1-weighted images of the thoracic spine  were obtained.    Lumbar spine: Sagittal T1-weighted, sagittal 3-D volumetric  T2-weighted, sagittal STIR , sagittal diffusion-weighted and axial  reconstructed images of the lumbar spine were obtained without the  administration of intravenous contrast. After the administration of  intravenous contrast, axial and sagittal fat-saturated T1-weighted  images of the lumbar spine were obtained.    Contrast dose: 6mL Gadavist    Findings:    Cervical:  The cervical vertebrae appear normally aligned.  Bone  marrow signal intensity appears normal.  There is no abnormal signal  within the cervical spinal cord.  On a level by level basis:    C2-3: No spinal canal or neural foraminal stenosis.  C3-4: Mild disc bulge without spinal canal or neural foraminal  stenosis.  C4-5: Mild disc bulge without spinal canal or neural foraminal  stenosis.  C5-6: Mild disc bulge without spinal canal or neural foraminal  stenosis.  C6-7: No spinal canal or neural foraminal stenosis.  C7-T1:No spinal canal or neural foraminal stenosis.    No abnormal enhancement of the paraspinous tissues, vertebral column,  or within the spinal canal.    Thoracic:  The thoracic vertebrae  are in normal alignment.  Bone  marrow signal intensity appears normal.  There is no abnormal signal  within the thoracic spinal cord. No spinal canal or neural foraminal  stenosis. No abnormal enhancement of the paraspinous tissues,  vertebral column, or within the spinal canal.    Lumbar: There are 5 lumbar-type vertebrae assumed for the purposes of  this dictation. The tip of the conus medullaris is at L1.  The lumbar  vertebral column appears normally aligned.  Bone marrow signal  intensity appears normal. There is no disc height narrowing at any  level.    No spinal canal or foraminal stenosis at any level.     Normal paraspinous soft tissues.. No abnormal enhancement of the  paraspinous tissues, vertebral column, or within the spinal canal.      Impression    Impression: Subtle disc bulges at C3-4, C4-5 and C5-6. No spinal canal  or neural foraminal stenosis at any level. Normal cervical and  thoracic spinal cord signal. No suspicious enhancement in the spinal  neuraxis to explain symptoms.    MIC BRAY MD         SYSTEM ID:  [id number]   Glucose CSF:     Status: Normal    Collection Time: 09/01/23  9:56 PM   Result Value Ref Range    Glucose CSF 52 40 - 70 mg/dL    Narrative    CSF glucose concentrations are about 60 percent of normal plasma glucose.   Protein total CSF:     Status: Normal    Collection Time: 09/01/23  9:56 PM   Result Value Ref Range    Protein total CSF 21.7 15.0 - 45.0 mg/dL   CSF Cell Count with Differential:     Status: None (In process)    Collection Time: 09/01/23  9:56 PM    Narrative    The following orders were created for panel order CSF Cell Count with Differential:.  Procedure                               Abnormality         Status                     ---------                               -----------         ------                     Cell Count CSF[[id number]]                                   In process                   Please view results for these tests on the individual  orders.   -Lumbar Puncture     Status: None    Collection Time: 09/01/23  9:56 PM    Narrative    Jessica Lawrence MD     9/2/2023 12:09 AM  [facility]    -Lumbar Puncture    Date/Time: 9/1/2023 9:56 PM    Performed by: Jessica Lawrence MD  Authorized by: Jessica Lawrence MD    Emergent condition/consent implied      PRE-PROCEDURE DETAILS:     Procedure purpose:  Diagnostic    ANESTHESIA (see MAR for exact dosages):     Anesthesia method:  Local infiltration    Local anesthetic:  Lidocaine 1% w/o epi    PROCEDURE DETAILS:     Lumbar space:  L4-L5 interspace    Patient position:  Sitting    Needle gauge:  20    Needle type:  Spinal needle - Quincke tip    Ultrasound guidance: no      Number of attempts:  1    Fluid appearance:  Clear    Tubes of fluid:  4    Total volume (ml):  10    POST-PROCEDURE:     Puncture site:  Adhesive bandage applied      PROCEDURE    Patient Tolerance:  Patient tolerated the procedure well with no immediate   complications (Post LP Headache)       ED MEDICATIONS:   Medications   lidocaine (PF) (XYLOCAINE) 1 % injection (has no administration in time range)   0.9% sodium chloride BOLUS (1,000 mLs Intravenous $New Bag 9/2/23 0013)   0.9% sodium chloride BOLUS ( Intravenous Canceled Entry 9/2/23 0013)   gadobutrol (GADAVIST) injection 7.5 mL (6 mLs Intravenous $Given 9/1/23 1944)   lidocaine 1 % 5 mg (5 mg Intradermal $Given 9/1/23 2204)   0.9% sodium chloride BOLUS (1,000 mLs Intravenous $New Bag 9/1/23 2202)   acetaminophen (TYLENOL) tablet 975 mg (975 mg Oral $Given 9/1/23 2208)   metoclopramide (REGLAN) injection 10 mg (10 mg Intravenous $Given 9/1/23 2213)   diphenhydrAMINE (BENADRYL) injection 25 mg (25 mg Intravenous $Given 9/1/23 2212)         Impression:    ICD-10-CM    1. Paresthesias  R20.2           Plan:  CSF large unremarkable except for a few red blood cells likely related to traumatic tap.  Neurology cleared the patient from their  standpoint and will follow-up as an outpatient.  Patient and family agree with this plan.        MD Evangelist Alatorre Matthew Joseph, MD  09/02/23 0119

## 2023-09-03 ENCOUNTER — APPOINTMENT (OUTPATIENT)
Dept: CT IMAGING | Facility: CLINIC | Age: 28
End: 2023-09-03
Attending: EMERGENCY MEDICINE
Payer: COMMERCIAL

## 2023-09-03 ENCOUNTER — HOSPITAL ENCOUNTER (EMERGENCY)
Facility: CLINIC | Age: 28
Discharge: HOME | End: 2023-09-03
Attending: EMERGENCY MEDICINE | Admitting: EMERGENCY MEDICINE
Payer: COMMERCIAL

## 2023-09-03 VITALS
HEART RATE: 64 BPM | SYSTOLIC BLOOD PRESSURE: 101 MMHG | RESPIRATION RATE: 20 BRPM | DIASTOLIC BLOOD PRESSURE: 63 MMHG | OXYGEN SATURATION: 100 %

## 2023-09-03 DIAGNOSIS — G97.1 SPINAL PUNCTURE HEADACHE: ICD-10-CM

## 2023-09-03 LAB
ANION GAP SERPL CALCULATED.3IONS-SCNC: 13 MMOL/L (ref 7–15)
BASOPHILS # BLD AUTO: 0 10E3/UL (ref 0–0.2)
BASOPHILS NFR BLD AUTO: 0 %
BUN SERPL-MCNC: 11.2 MG/DL (ref 6–20)
CALCIUM SERPL-MCNC: 9.1 MG/DL (ref 8.6–10)
CHLORIDE SERPL-SCNC: 108 MMOL/L (ref 98–107)
CREAT SERPL-MCNC: 0.59 MG/DL (ref 0.51–0.95)
DEPRECATED HCO3 PLAS-SCNC: 19 MMOL/L (ref 22–29)
EOSINOPHIL # BLD AUTO: 0.1 10E3/UL (ref 0–0.7)
EOSINOPHIL NFR BLD AUTO: 1 %
ERYTHROCYTE [DISTWIDTH] IN BLOOD BY AUTOMATED COUNT: 11.9 % (ref 10–15)
GFR SERPL CREATININE-BSD FRML MDRD: >90 ML/MIN/1.73M2
GLUCOSE SERPL-MCNC: 107 MG/DL (ref 70–99)
HCT VFR BLD AUTO: 40.8 % (ref 35–47)
HGB BLD-MCNC: 14.1 G/DL (ref 11.7–15.7)
IMM GRANULOCYTES # BLD: 0.1 10E3/UL
IMM GRANULOCYTES NFR BLD: 0 %
LYMPHOCYTES # BLD AUTO: 2 10E3/UL (ref 0.8–5.3)
LYMPHOCYTES NFR BLD AUTO: 15 %
MCH RBC QN AUTO: 32 PG (ref 26.5–33)
MCHC RBC AUTO-ENTMCNC: 34.6 G/DL (ref 31.5–36.5)
MCV RBC AUTO: 93 FL (ref 78–100)
MONOCYTES # BLD AUTO: 0.4 10E3/UL (ref 0–1.3)
MONOCYTES NFR BLD AUTO: 3 %
NEUTROPHILS # BLD AUTO: 10.6 10E3/UL (ref 1.6–8.3)
NEUTROPHILS NFR BLD AUTO: 81 %
NRBC # BLD AUTO: 0 10E3/UL
NRBC BLD AUTO-RTO: 0 /100
PLATELET # BLD AUTO: 317 10E3/UL (ref 150–450)
POTASSIUM SERPL-SCNC: 3.6 MMOL/L (ref 3.4–5.3)
RBC # BLD AUTO: 4.4 10E6/UL (ref 3.8–5.2)
SODIUM SERPL-SCNC: 140 MMOL/L (ref 136–145)
WBC # BLD AUTO: 13.2 10E3/UL (ref 4–11)

## 2023-09-03 PROCEDURE — 70450 CT HEAD/BRAIN W/O DYE: CPT

## 2023-09-03 PROCEDURE — 36415 COLL VENOUS BLD VENIPUNCTURE: CPT | Performed by: EMERGENCY MEDICINE

## 2023-09-03 PROCEDURE — 250N000011 HC RX IP 250 OP 636: Performed by: EMERGENCY MEDICINE

## 2023-09-03 PROCEDURE — 80048 BASIC METABOLIC PNL TOTAL CA: CPT | Performed by: EMERGENCY MEDICINE

## 2023-09-03 PROCEDURE — 99285 EMERGENCY DEPT VISIT HI MDM: CPT | Performed by: EMERGENCY MEDICINE

## 2023-09-03 PROCEDURE — 96374 THER/PROPH/DIAG INJ IV PUSH: CPT | Performed by: EMERGENCY MEDICINE

## 2023-09-03 PROCEDURE — 250N000011 HC RX IP 250 OP 636: Mod: JZ | Performed by: EMERGENCY MEDICINE

## 2023-09-03 PROCEDURE — 258N000003 HC RX IP 258 OP 636: Performed by: EMERGENCY MEDICINE

## 2023-09-03 PROCEDURE — 85025 COMPLETE CBC W/AUTO DIFF WBC: CPT | Performed by: EMERGENCY MEDICINE

## 2023-09-03 PROCEDURE — 99285 EMERGENCY DEPT VISIT HI MDM: CPT | Mod: 25 | Performed by: EMERGENCY MEDICINE

## 2023-09-03 PROCEDURE — 250N000013 HC RX MED GY IP 250 OP 250 PS 637: Performed by: EMERGENCY MEDICINE

## 2023-09-03 PROCEDURE — 70498 CT ANGIOGRAPHY NECK: CPT | Mod: 26 | Performed by: STUDENT IN AN ORGANIZED HEALTH CARE EDUCATION/TRAINING PROGRAM

## 2023-09-03 PROCEDURE — 96361 HYDRATE IV INFUSION ADD-ON: CPT | Performed by: EMERGENCY MEDICINE

## 2023-09-03 PROCEDURE — 70496 CT ANGIOGRAPHY HEAD: CPT | Mod: 26 | Performed by: STUDENT IN AN ORGANIZED HEALTH CARE EDUCATION/TRAINING PROGRAM

## 2023-09-03 PROCEDURE — 70496 CT ANGIOGRAPHY HEAD: CPT

## 2023-09-03 RX ORDER — BUTALBITAL, ACETAMINOPHEN AND CAFFEINE 50; 325; 40 MG/1; MG/1; MG/1
1-2 TABLET ORAL EVERY 8 HOURS PRN
Qty: 30 TABLET | Refills: 0 | Status: SHIPPED | OUTPATIENT
Start: 2023-09-03 | End: 2023-09-06

## 2023-09-03 RX ORDER — IOPAMIDOL 755 MG/ML
67 INJECTION, SOLUTION INTRAVASCULAR ONCE
Status: COMPLETED | OUTPATIENT
Start: 2023-09-03 | End: 2023-09-03

## 2023-09-03 RX ORDER — ONDANSETRON 4 MG/1
4 TABLET, ORALLY DISINTEGRATING ORAL EVERY 6 HOURS PRN
Qty: 10 TABLET | Refills: 0 | Status: SHIPPED | OUTPATIENT
Start: 2023-09-03 | End: 2023-09-06

## 2023-09-03 RX ORDER — ONDANSETRON 2 MG/ML
4 INJECTION INTRAMUSCULAR; INTRAVENOUS EVERY 30 MIN PRN
Status: DISCONTINUED | OUTPATIENT
Start: 2023-09-03 | End: 2023-09-03 | Stop reason: HOSPADM

## 2023-09-03 RX ORDER — CAFFEINE 200 MG
200 TABLET ORAL ONCE
Status: COMPLETED | OUTPATIENT
Start: 2023-09-03 | End: 2023-09-03

## 2023-09-03 RX ORDER — BUTALBITAL, ACETAMINOPHEN AND CAFFEINE 50; 325; 40 MG/1; MG/1; MG/1
1-2 TABLET ORAL EVERY 8 HOURS PRN
Qty: 30 TABLET | Refills: 0 | Status: SHIPPED | OUTPATIENT
Start: 2023-09-03 | End: 2023-09-03

## 2023-09-03 RX ORDER — HYDROMORPHONE HYDROCHLORIDE 1 MG/ML
0.5 INJECTION, SOLUTION INTRAMUSCULAR; INTRAVENOUS; SUBCUTANEOUS
Status: DISCONTINUED | OUTPATIENT
Start: 2023-09-03 | End: 2023-09-03 | Stop reason: HOSPADM

## 2023-09-03 RX ORDER — ONDANSETRON 4 MG/1
4 TABLET, ORALLY DISINTEGRATING ORAL EVERY 6 HOURS PRN
Qty: 10 TABLET | Refills: 0 | Status: SHIPPED | OUTPATIENT
Start: 2023-09-03 | End: 2023-09-03

## 2023-09-03 RX ORDER — KETOROLAC TROMETHAMINE 15 MG/ML
15 INJECTION, SOLUTION INTRAMUSCULAR; INTRAVENOUS ONCE
Status: COMPLETED | OUTPATIENT
Start: 2023-09-03 | End: 2023-09-03

## 2023-09-03 RX ADMIN — IOPAMIDOL 67 ML: 755 INJECTION, SOLUTION INTRAVENOUS at 13:38

## 2023-09-03 RX ADMIN — CAFFEINE 200 MG: 200 TABLET ORAL at 12:57

## 2023-09-03 RX ADMIN — SODIUM CHLORIDE 1000 ML: 9 INJECTION, SOLUTION INTRAVENOUS at 12:28

## 2023-09-03 RX ADMIN — KETOROLAC TROMETHAMINE 15 MG: 15 INJECTION, SOLUTION INTRAMUSCULAR; INTRAVENOUS at 12:06

## 2023-09-03 ASSESSMENT — ACTIVITIES OF DAILY LIVING (ADL)
ADLS_ACUITY_SCORE: 35
ADLS_ACUITY_SCORE: 35

## 2023-09-03 NOTE — ED NOTES
Bed: ED05  Expected date:   Expected time:   Means of arrival:   Comments:  [id number]  27F recent lumbar puncture vomiting

## 2023-09-03 NOTE — Clinical Note
Pam Thompson was seen and treated in our emergency department on 9/3/2023.  She may return to work on 09/07/2023.       If you have any questions or concerns, please don't hesitate to call.      Christian Diaz MD

## 2023-09-03 NOTE — ED PROVIDER NOTES
[place] EMERGENCY DEPARTMENT ([facility])    23       ED PROVIDER NOTE  ED 05    History     Chief Complaint   Patient presents with    Nausea & Vomiting     HPI  Pam Thompson is a 27 year old female who presents to the emergency department for complaints of a headache over the last 2 days ever since having a lumbar puncture done in the ER.  Patient states that her headache started out frontal, became global and posterior, and is associate with nausea and vomiting.  The patient states these symptoms are worse when she sits up and better when she lies down.  Patient denies any fevers, denies any new neurologic symptoms and presents here to the ER for evaluation.    This part of the medical record was transcribed by LATASHA VELAZQUEZ Medical Scribe, from a dictation done by Christian Diaz MD.     Past Medical History  Past Medical History:   Diagnosis Date    History of colposcopy 2022    Migraine      Past Surgical History:   Procedure Laterality Date    APPENDECTOMY       SECTION N/A 10/27/2020    Procedure:  SECTION;  Surgeon: Janet De La Garza MD;  Location:  L+D    COLONOSCOPY N/A 9/15/2022    Procedure: COLONOSCOPY;  Surgeon: Amandeep Maurice MD;  Location:  GI    ESOPHAGOSCOPY, GASTROSCOPY, DUODENOSCOPY (EGD), COMBINED N/A 9/15/2022    Procedure: ESOPHAGOGASTRODUODENOSCOPY, WITH BIOPSY;  Surgeon: Amandeep Maurice MD;  Location:  GI     butalbital-acetaminophen-caffeine (ESGIC) -40 MG tablet  ondansetron (ZOFRAN ODT) 4 MG ODT tab  EPINEPHrine (EPIPEN 2-RICO) 0.3 MG/0.3ML injection 2-pack  gabapentin (NEURONTIN) 100 MG capsule      Allergies   Allergen Reactions    Watermelon Flavor Anaphylaxis     Lip and face swelling.    Bananas [Banana] Swelling     Swollen lips and tongue    Cefazolin Itching    Cephalosporins     Pineapple      Swollen lips and tongue     Family History  Family History   Problem Relation Age of Onset    Diabetes  Father      Social History   Social History     Tobacco Use    Smoking status: Never    Smokeless tobacco: Never   Vaping Use    Vaping Use: Never used   Substance Use Topics    Alcohol use: Not Currently    Drug use: Never      Past medical history, past surgical history, medications, allergies, family history, and social history were reviewed with the patient. No additional pertinent items.      A complete review of systems was performed with pertinent positives and negatives noted in the HPI, and all other systems negative.    Physical Exam   BP: 104/67  Pulse: 65  Resp: 20  SpO2: 100 %  Physical Exam  Vitals and nursing note reviewed.   Constitutional:       Comments: But in some moderate distress secondary to a left-sided headache   HENT:      Head: Atraumatic.   Eyes:      Extraocular Movements: Extraocular movements intact.      Pupils: Pupils are equal, round, and reactive to light.   Cardiovascular:      Rate and Rhythm: Regular rhythm.   Pulmonary:      Breath sounds: Normal breath sounds.   Musculoskeletal:         General: No deformity.      Cervical back: Neck supple.   Neurological:      General: No focal deficit present.      Mental Status: She is alert and oriented to person, place, and time.      Cranial Nerves: No cranial nerve deficit.      Motor: No weakness.   Psychiatric:      Comments: Flat           ED Course, Procedures, & Data     ED Course as of 09/03/23 1509   Sun Sep 03, 2023   1138 Anesthesia called and will see the patient     Procedures        Anesthesia came to the ER to see the patient and agreed that the patient may get some improvement with a blood patch but decided against doing a blood patch because I did not want to complicate any neurologic work-up in process for the patient's other neurologic syndromes.  Anesthesia recommended treating the headache with conservative management.     Results for orders placed or performed during the hospital encounter of 09/03/23   CT Head w/o  Contrast     Status: None    Narrative    CT HEAD W/O CONTRAST, CTA HEAD NECK W CONTRAST 9/3/2023 1:56 PM    CT Head without contrast  CT Angiogram of the Neck with contrast   CT Angiogram of the Head with contrast    History:  acute headache    Comparison: MRI 8/30/2023.     Technique:   HEAD CT: Using multidetector thin collimation helical acquisition  technique, axial, coronal and sagittal CT images were obtained from  the base of the skull to the vertex without intravenous contrast and  reviewed in brain, bone, and subdural windows.     HEAD and NECK CTA: Thereafter, postcontrast images were obtained from  the aortic arch through the [place].  Axial images were  obtained using thin collimation multidetector helical technique. This  CT angiogram data was reconstructed at thin intervals with mild  overlap. Images were sent to the [employer] workstation, and 3D  reconstructions and multiplanar reformations were obtained with  reconstructions performed by the technologist and the radiologist. The  source images, multiplanar reformations, 3D reconstructions in both  maximum intensity projection display and volume rendered models were  reviewed,     Contrast: 67ml isovue 370    Findings:   Head CT: There is no evidence of intracranial hemorrhage, mass effect,  or midline shift. Gray/white matter differentiation in both cerebral  hemispheres is preserved. The ventricles and sulci are enlarged but  within normal limits for the patient's age, and the ventricles are not  out of proportion to the sulci.     The visualized portions of the paranasal sinuses and mastoid air cells  are clear.    Head CTA demonstrates no stenosis of the major intracranial arteries.  2.3 mm posteriorly oriented saccular outpouching arising from  posterior communicating artery origin (series 7 image 54, series 5  image 328).  The anterior communicating artery is patent. The posterior  communicating arteries are patent bilaterally.     Neck  CTA demonstrates no stenosis of the major cervical arteries on  either side. The origins of the great vessels from the aortic arch are  patent. The normal distal right internal carotid artery measures 5 mm.  The normal distal left internal carotid artery measures 5 mm.     No mass is noted within the visualized portions of the cervical soft  tissues or lung apices.       Impression    Impression:   1. No evidence of intracranial hemorrhage.  2. Head CTA demonstrates no stenosis of the major intracranial  arteries. 2.3 mm posteriorly oriented saccular outpouching arising  from posterior communicating artery origin, which can be a small  aneurysm vs dilated infundibulum.  3. Neck CTA demonstrates no stenosis of the major cervical arteries.     I have personally reviewed the examination and initial interpretation  and I agree with the findings.    MIC BRAY MD         SYSTEM ID:  [id number]   CT Head Neck Angio w/o & w Contrast     Status: None    Narrative    CT HEAD W/O CONTRAST, CTA HEAD NECK W CONTRAST 9/3/2023 1:56 PM    CT Head without contrast  CT Angiogram of the Neck with contrast   CT Angiogram of the Head with contrast    History:  acute headache    Comparison: MRI 8/30/2023.     Technique:   HEAD CT: Using multidetector thin collimation helical acquisition  technique, axial, coronal and sagittal CT images were obtained from  the base of the skull to the vertex without intravenous contrast and  reviewed in brain, bone, and subdural windows.     HEAD and NECK CTA: Thereafter, postcontrast images were obtained from  the aortic arch through the [place].  Axial images were  obtained using thin collimation multidetector helical technique. This  CT angiogram data was reconstructed at thin intervals with mild  overlap. Images were sent to the [employer] workstation, and 3D  reconstructions and multiplanar reformations were obtained with  reconstructions performed by the technologist and the radiologist.  The  source images, multiplanar reformations, 3D reconstructions in both  maximum intensity projection display and volume rendered models were  reviewed,     Contrast: 67ml isovue 370    Findings:   Head CT: There is no evidence of intracranial hemorrhage, mass effect,  or midline shift. Gray/white matter differentiation in both cerebral  hemispheres is preserved. The ventricles and sulci are enlarged but  within normal limits for the patient's age, and the ventricles are not  out of proportion to the sulci.     The visualized portions of the paranasal sinuses and mastoid air cells  are clear.    Head CTA demonstrates no stenosis of the major intracranial arteries.  2.3 mm posteriorly oriented saccular outpouching arising from  posterior communicating artery origin (series 7 image 54, series 5  image 328).  The anterior communicating artery is patent. The posterior  communicating arteries are patent bilaterally.     Neck CTA demonstrates no stenosis of the major cervical arteries on  either side. The origins of the great vessels from the aortic arch are  patent. The normal distal right internal carotid artery measures 5 mm.  The normal distal left internal carotid artery measures 5 mm.     No mass is noted within the visualized portions of the cervical soft  tissues or lung apices.       Impression    Impression:   1. No evidence of intracranial hemorrhage.  2. Head CTA demonstrates no stenosis of the major intracranial  arteries. 2.3 mm posteriorly oriented saccular outpouching arising  from posterior communicating artery origin, which can be a small  aneurysm vs dilated infundibulum.  3. Neck CTA demonstrates no stenosis of the major cervical arteries.     I have personally reviewed the examination and initial interpretation  and I agree with the findings.    MIC BRAY MD         SYSTEM ID:  [id number]   Basic metabolic panel     Status: Abnormal   Result Value Ref Range    Sodium 140 136 - 145 mmol/L     Potassium 3.6 3.4 - 5.3 mmol/L    Chloride 108 (H) 98 - 107 mmol/L    Carbon Dioxide (CO2) 19 (L) 22 - 29 mmol/L    Anion Gap 13 7 - 15 mmol/L    Urea Nitrogen 11.2 6.0 - 20.0 mg/dL    Creatinine 0.59 0.51 - 0.95 mg/dL    Calcium 9.1 8.6 - 10.0 mg/dL    Glucose 107 (H) 70 - 99 mg/dL    GFR Estimate >90 >60 mL/min/1.73m2   CBC with platelets and differential     Status: Abnormal   Result Value Ref Range    WBC Count 13.2 (H) 4.0 - 11.0 10e3/uL    RBC Count 4.40 3.80 - 5.20 10e6/uL    Hemoglobin 14.1 11.7 - 15.7 g/dL    Hematocrit 40.8 35.0 - 47.0 %    MCV 93 78 - 100 fL    MCH 32.0 26.5 - 33.0 pg    MCHC 34.6 31.5 - 36.5 g/dL    RDW 11.9 10.0 - 15.0 %    Platelet Count 317 150 - 450 10e3/uL    % Neutrophils 81 %    % Lymphocytes 15 %    % Monocytes 3 %    % Eosinophils 1 %    % Basophils 0 %    % Immature Granulocytes 0 %    NRBCs per 100 WBC 0 <1 /100    Absolute Neutrophils 10.6 (H) 1.6 - 8.3 10e3/uL    Absolute Lymphocytes 2.0 0.8 - 5.3 10e3/uL    Absolute Monocytes 0.4 0.0 - 1.3 10e3/uL    Absolute Eosinophils 0.1 0.0 - 0.7 10e3/uL    Absolute Basophils 0.0 0.0 - 0.2 10e3/uL    Absolute Immature Granulocytes 0.1 <=0.4 10e3/uL    Absolute NRBCs 0.0 10e3/uL   CBC with platelets differential     Status: Abnormal    Narrative    The following orders were created for panel order CBC with platelets differential.  Procedure                               Abnormality         Status                     ---------                               -----------         ------                     CBC with platelets and d...[[id number]]  Abnormal            Final result                 Please view results for these tests on the individual orders.     Medications   ondansetron (ZOFRAN) injection 4 mg (has no administration in time range)   HYDROmorphone (PF) (DILAUDID) injection 0.5 mg (has no administration in time range)   ketorolac (TORADOL) injection 15 mg (15 mg Intravenous $Given 9/3/23 3656)   0.9% sodium chloride BOLUS (0 mLs  Intravenous Stopped 9/3/23 1350)   caffeine (NO-DOZE) tablet 200 mg (200 mg Oral $Given 9/3/23 1257)   iopamidol (ISOVUE-370) solution 67 mL (67 mLs Intravenous $Given 9/3/23 1338)   sodium chloride (PF) 0.9% PF flush 90 mL (90 mLs Intravenous $Given 9/3/23 1338)       Critical care was not performed.     Medical Decision Making  The patient's presentation was of moderate complexity (an acute illness with systemic symptoms).    The patient's evaluation involved:  an assessment requiring an independent historian (anesthesia and neurology)  review of external note(s) from 3+ sources (see epic)  review of 3+ test result(s) ordered prior to this encounter (see epic)  ordering and/or review of 3+ test(s) in this encounter (see above)  independent interpretation of testing performed by another health professional (neurology)  discussion of management or test interpretation with another health professional (neurology and anesthesia)    The patient's management necessitated high risk (a decision regarding hospitalization) which was ultimately decided against.    Assessment & Plan      I have reviewed the nursing notes.    Patient felt better after the medications given above and at this time will be sent home with conservative management as recommended.    I have reviewed the findings, diagnosis, plan and need for follow up with the patient.    New Prescriptions    BUTALBITAL-ACETAMINOPHEN-CAFFEINE (ESGIC) -40 MG TABLET    Take 1-2 tablets by mouth every 8 hours as needed for headaches    ONDANSETRON (ZOFRAN ODT) 4 MG ODT TAB    Take 1 tablet (4 mg) by mouth every 6 hours as needed for nausea     Orders Placed This Encounter   Procedures    CT Head w/o Contrast    CT Head Neck Angio w/o & w Contrast    Basic metabolic panel    CBC with platelets and differential    Adult Neurology  Referral    Peripheral IV: Standard    CBC with platelets differential       Final diagnoses:   Spinal puncture headache     Home  to rest today.  Work note given.    Will your prescriptions and take as directed.    Please make an appointment to follow up with Neurology Clinic (phone: [phone]) as soon as possible for recheck and further evaluation.      Christian Diaz MD  [facility] EMERGENCY DEPARTMENT  9/3/2023     Christian Diaz MD  09/03/23 9363

## 2023-09-03 NOTE — H&P
"     [place] ANESTHESIA DEPARTMENT ([facility])    9/03/23           HPI  Pam Thompson is a 27 year old female with PMH of migraine  who presents to the ED for complaints of a headache over the last 2 days.  On September 1st, she underwent a diagnostic lumbar puncture, during which she noted that the headache began immediately after the procedure commenced while the needle was inserted into her back and has persisted unchanged ever since.  Patient states that headache started immediately after having Lumbar puncture and has been remained unchanged since then.  headache characteristic:  Fronto Occipital with some periorbital components, associated with photophobia, nausea and vomiting. Her symptoms  worsen when she sits up, and walking and improves when lies down.   Patient denies any fevers, back pain, vision changes denies any new neurologic symptoms and presents here to the ER for evaluation.  She mentions the severity and presentation of the headache is different than her usual migraine headache.    Per patient on 8/27 she presented to ED with new onset of numbness in the bilateral feet and mild tingling in her hands.   Work-up regarding her symptoms   B12 WNL  NARAYAN negative  MRI head 8/30/2023  1.  Two punctate nonenhancing foci of T2 prolongation within the subcortical white matter of the bilateral frontal lobes,         of doubtful clinical significance.  2.  Otherwise, unremarkable brain MRI.          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    Weight as of 8/29/23: 51.7 kg (114 lb).     PH/E:  AAOx3  Cranial nerve exam: as mention below was WNL  Trochlear Nerve Abducens Nerve   Facial Nerve  Glossopharyngeal " Nerve  Accessory Nerve  Hypoglossal Nerve  No nystagmus         Plan:  The patient's headache presentation and characteristics resemble those commonly associated with post-dural puncture headache. However, the timing doesn't align with the typical onset of PDPH. We discussed the possibility of a transient headache if it were related to PDPH, as well as the risks and benefits of the epidural blood patch procedure. Additionally, we explored conservative management options. The patient has chosen not to proceed with the epidural blood patch procedure at this time and has opted to continue with conservative treatment.  Plan discussed with Dr. Gonzales , patient and her .  We discussed that she should return to the emergency department (ED) if she develops any of the following symptoms: a worsening headache, neurological symptoms, focal symptoms, or systemic symptoms such as fever to rule out any serious underlying conditions      Conservative treatment:  Hydration  Bed rest  Brief course of oral caffeine  and oral analgesics (Tylenol, NSAIDs)

## 2023-09-03 NOTE — DISCHARGE INSTRUCTIONS
Home to rest today.    Will your prescriptions and take as directed.    Please make an appointment to follow up with Neurology Clinic (phone: [phone]) as soon as possible for recheck and further evaluation.

## 2023-09-05 ENCOUNTER — TELEPHONE (OUTPATIENT)
Dept: NEUROLOGY | Facility: CLINIC | Age: 28
End: 2023-09-05

## 2023-09-05 NOTE — TELEPHONE ENCOUNTER
Patient's  ([facility] on file) calling back to schedule appointment regarding symptoms noted below. No office appointments with primary care today systemwide for further evaluation and advised to go to UC/ED.  still waiting on call back from neurology to have patient scheduled in.    DONNA Reed  [facility] Primary Care Triage

## 2023-09-05 NOTE — TELEPHONE ENCOUNTER
[facility] Call Center    Phone Message    May a detailed message be left on voicemail: yes     Reason for Call: Symptoms or Concerns     If patient has red-flag symptoms, warm transfer to triage line    Current symptom or concern: numbness, tingling, buzzing, headache, dizziness, pain behind eye    Symptoms have been present for:  4 day(s)    Has patient previously been seen for this? Yes    By : Dr. Okeefe    Date: 09/05/23    Are there any new or worsening symptoms? Yes: Pt's  called to schedule Pt per referral from ER, writer sees she is a Pt of Dr. Okeefe and  states that she is having symptoms listed above that have increased in the last 4 days.  Referral states priority 1-2 weeks.    Please call back  Angela Alexander at [phone] to advise.    Action Taken: Message routed to:  Other: [facility]    Travel Screening: Not Applicable

## 2023-09-06 ENCOUNTER — OFFICE VISIT (OUTPATIENT)
Dept: FAMILY MEDICINE | Facility: CLINIC | Age: 28
End: 2023-09-06
Payer: COMMERCIAL

## 2023-09-06 ENCOUNTER — OFFICE VISIT (OUTPATIENT)
Dept: NEUROLOGY | Facility: CLINIC | Age: 28
End: 2023-09-06
Payer: COMMERCIAL

## 2023-09-06 VITALS
OXYGEN SATURATION: 99 % | SYSTOLIC BLOOD PRESSURE: 95 MMHG | RESPIRATION RATE: 12 BRPM | HEART RATE: 69 BPM | DIASTOLIC BLOOD PRESSURE: 58 MMHG | TEMPERATURE: 98.8 F

## 2023-09-06 VITALS
SYSTOLIC BLOOD PRESSURE: 100 MMHG | HEIGHT: 61 IN | HEART RATE: 59 BPM | DIASTOLIC BLOOD PRESSURE: 70 MMHG | BODY MASS INDEX: 21.52 KG/M2 | WEIGHT: 114 LBS

## 2023-09-06 DIAGNOSIS — R11.0 NAUSEA: ICD-10-CM

## 2023-09-06 DIAGNOSIS — G43.111 INTRACTABLE MIGRAINE WITH AURA WITH STATUS MIGRAINOSUS: Primary | ICD-10-CM

## 2023-09-06 DIAGNOSIS — R20.2 PARESTHESIAS: ICD-10-CM

## 2023-09-06 DIAGNOSIS — G97.1 SPINAL PUNCTURE HEADACHE: Primary | ICD-10-CM

## 2023-09-06 DIAGNOSIS — R51.9 SEVERE HEADACHE: ICD-10-CM

## 2023-09-06 PROCEDURE — 99214 OFFICE O/P EST MOD 30 MIN: CPT | Mod: 25 | Performed by: FAMILY MEDICINE

## 2023-09-06 PROCEDURE — 96372 THER/PROPH/DIAG INJ SC/IM: CPT | Performed by: FAMILY MEDICINE

## 2023-09-06 PROCEDURE — 99215 OFFICE O/P EST HI 40 MIN: CPT | Performed by: PSYCHIATRY & NEUROLOGY

## 2023-09-06 RX ORDER — BUTALBITAL, ACETAMINOPHEN AND CAFFEINE 50; 325; 40 MG/1; MG/1; MG/1
1-2 TABLET ORAL EVERY 8 HOURS PRN
Qty: 30 TABLET | Refills: 0 | Status: SHIPPED | OUTPATIENT
Start: 2023-09-06 | End: 2023-12-20

## 2023-09-06 RX ORDER — RIZATRIPTAN BENZOATE 10 MG/1
10 TABLET ORAL
Qty: 9 TABLET | Refills: 11 | Status: SHIPPED | OUTPATIENT
Start: 2023-09-06 | End: 2023-11-22

## 2023-09-06 RX ORDER — ONDANSETRON 4 MG/1
4 TABLET, ORALLY DISINTEGRATING ORAL EVERY 8 HOURS PRN
Qty: 15 TABLET | Refills: 0 | Status: SHIPPED | OUTPATIENT
Start: 2023-09-06 | End: 2023-09-06

## 2023-09-06 RX ORDER — ONDANSETRON 4 MG/1
4 TABLET, ORALLY DISINTEGRATING ORAL EVERY 8 HOURS PRN
Qty: 15 TABLET | Refills: 0 | Status: SHIPPED | OUTPATIENT
Start: 2023-09-06 | End: 2023-09-13

## 2023-09-06 RX ORDER — NORTRIPTYLINE HCL 10 MG
20 CAPSULE ORAL AT BEDTIME
Qty: 60 CAPSULE | Refills: 11 | Status: SHIPPED | OUTPATIENT
Start: 2023-09-06 | End: 2023-11-22

## 2023-09-06 RX ORDER — ONDANSETRON 2 MG/ML
4 INJECTION INTRAMUSCULAR; INTRAVENOUS ONCE
Status: COMPLETED | OUTPATIENT
Start: 2023-09-06 | End: 2023-09-06

## 2023-09-06 RX ORDER — KETOROLAC TROMETHAMINE 30 MG/ML
60 INJECTION, SOLUTION INTRAMUSCULAR; INTRAVENOUS ONCE
Status: COMPLETED | OUTPATIENT
Start: 2023-09-06 | End: 2023-09-06

## 2023-09-06 RX ADMIN — ONDANSETRON 4 MG: 2 INJECTION INTRAMUSCULAR; INTRAVENOUS at 11:41

## 2023-09-06 RX ADMIN — KETOROLAC TROMETHAMINE 60 MG: 30 INJECTION, SOLUTION INTRAMUSCULAR; INTRAVENOUS at 11:40

## 2023-09-06 ASSESSMENT — PAIN SCALES - GENERAL: PAINLEVEL: WORST PAIN (10)

## 2023-09-06 NOTE — TELEPHONE ENCOUNTER
Spoke to pt, appt scheduled for today at 1pm with Dr. Okeefe.    Luisa Pedraza LPN on 9/6/2023 at 9:45 AM

## 2023-09-06 NOTE — PROGRESS NOTES
In person evaluation        HPI  8/29/2023, in person consultation  9/6/2023, in person visit (emergency add-on)        27-year-old been evaluated neurologically for:  Sensory changes in her legs and hands acute onset  Migraines with severe 3-day headache prior to these events    Patient added on emergently 9/6/2023  Has had difficulty with paresthesias now with headache that is probably more of a post LP headache  Brief summary of events since last seen  Fluctuating numbness progressive numbness from the knees up to the thighs worse when she sits for prolonged times  Had some chronic low back pain back when she had the birth of her child but that was distant and does not have back pain now  No specific weakness  No bowel or bladder difficulties although has a little bit of constipation  No fever or chills    Return to the ER on 9/1/2023 with complaints of some progressive numbness but still had reflexes  They performed a lumbar puncture  Had significant headache after the lumbar puncture  Lumbar puncture spinal fluid was benign no elevation of protein cell counts were okay    Return to the ER on 9/3/2023  Severe headache originally was frontal but then was global and posterior  Had nausea and vomiting with a headache worse when she was sitting up better when she laid down  Had signs and symptoms consistent with a spinal leak headache  Was going to get a blood patch but anesthesia thought it was too risky because her peripheral white count was running around 13 and they were concerned about infection  No stiff neck no meningismus no fever or chills no diarrhea    Had seen her primary with treatment of Zofran and Toradol injection earlier 9/6/2023 but not getting better  Called our office and had called our office over the last week or so almost every day with symptoms and signs    Due to significant symptoms I had her come to the office urgently so that I could do an actual exam on her.  Her neurologic exam is  stable  She has no cranial nerve abnormalities  Her mental status is normal  Her neck is supple  Reflexes are intact  She is ambulatory  Her proximal and distal strength is good    At this time I feel she has:  Paresthesias  Post spinal tap headache  Migraine headache/status migrainous  CTA showed small posterior communicating artery infundibula versus aneurysm 2.3 mm  CSF 9/1/2023 negative for hemorrhage or Guillain-Barré syndrome            Medical condition history onset: 8/29/2023 visit  Patient states that August 22, 2023 ago she had a 3-day bad headache with nausea but no vomiting  Had to get up and take care of the kids though no neurologic changes  Has had some chronic burning dysesthesias on the right side of her face  No history of visual loss  Had an episode of Jumbled vision unclear if this was oscillopsia but it was not associated with vertigo    Then August 27, 2023 watching a movie became numb in her feet up to her knee bilaterally came on over minutes  Took about an hour to get to the ER and then both hands seem to be numb  Numbness is in the hands and hand in the feet and legs    No bowel or bladder difficulty  He is able to ambulate  Except for some chronic numbness on the right face no new cranial nerve changes    Does have a history of migraines but no specific headache with the symptoms            A.  Patient with sudden onset bilateral leg numbness 8/27/2023       Onset of numbness occurred while seated on the couch was able to ambulate       No recent URI/GI symptoms or vaccinations       Did have a 3-day headache a week prior to this         Has 3 children one of them with a rash not sure if it was a viral syndrome         In the past has had vision jiggling x1 hour at night but no true vertigo or nausea vomiting       In the past has had some numbness on the right side of her face       Sometimes might have a little bit of trouble with swallowing her saliva chronic         Hyperextensible  joints      B.  History of migraine headaches       Frequency 1/month        Duration 2-5 hours        No loss of vision    C.  Severe headache lasting 3 days August 21, 2023        Had nausea no vomiting felt sick but has 3 kids had to get up and do things    D.  Post LP headache (LP 9/1/2023)      Past medical history  GERD  Migraines      Habits  Non-smoker  Does not drink alcohol  3 children ages 8, 5 and 2 (8/29/2023)    Family history  Father diabetes  Mother migraines  Paternal grandmother migraines      Work-up  Laboratory data 8/29/2023  MRI head 8/30/2023  1.  Two punctate nonenhancing foci of T2 prolongation within the subcortical white matter of the bilateral frontal lobes,         of doubtful clinical significance.  2.  Otherwise, unremarkable brain MRI.  CSF 9/1/2023, WBC 3, RBC 9, protein 21.7, glucose 52,  CT scan head 9/3/2023  1. No evidence of intracranial hemorrhage.  Head CTA 9/3/2023  1.  Demonstrates no stenosis of the major intracranial  arteries. 2.3 mm posteriorly oriented saccular outpouching arising  from posterior communicating artery origin, which can be a small  aneurysm vs dilated infundibulum.  Neck CTA demonstrates no stenosis of the major cervical arteries.  MRI cervical spine/thoracic/lumbar spine 9/1/2023.  (See official report)  Impression:   1.  Subtle disc bulges at C3-4, C4-5 and C5-6.   2.  No spinal canal or neural foraminal stenosis at any level.   3.  Normal cervical and thoracic spinal cord signal.   4.  No suspicious enhancement in the spinal       neuraxis to explain symptoms.  Laboratory data review                     8/2023 9/1/2023    NA/K           143/3.7  BUN/CR      17.9/0.99  GLU             108  AST              26  WBC/HGB    7.7/13.7  PLTs             381,000  hCG            negative    B12             801  NARAYAN            negative    CRP/ESR                                     <3.00/11    Exam  See above for recent events  Review of  systems pertinent positive negatives  No diplopia no dysarthria no true dysphagia  May have some trouble swallowing saliva but that has been going on on and off for a long time does not cough with eating    History of migraine headaches see above  No true vision loss  No true vertigo    Had episode of tingling vision lasting an hour at night where things seem to bother up and down    No chest pain no shortness of breath no nausea vomiting    No bowel or bladder difficulty    Did have numbness on the right side of her face going on for years mild burning type sensation    No true weakness    Otherwise review systems negative    General exam  100/70, pulse 59  Alert orient x3   helps with translation  HEENT normal/neck supple  Lungs clear  Heart rate regular  Abdomen soft positive bowel sounds  Symmetrical pulses  No edema the feet    Hyperextensible joints    Neurologic exam  Alert orient x3  Normal prosody speech  Normal naming  Normal comprehension  Normal repetition  No aphasia  No neglect  Memory recall okay    Cranial nerves II through XII  No ophthalmoplegia  No nystagmus  Optic fundi good no papilledema  Visual fields intact  Face symmetrical  Speech and tongue twisters okay    Upper extremities reported right over left  Deltoid 5/5  Triceps 5/5  Biceps 5/5  Wrist/finger flexors 5/5  Wrist/finger extensors 5/5  Intrinsic strength 5/5    No drift  No tremor  Rapid alternating movements good    Lower extremities  Iliopsoas 5/5  Quadriceps 5/5  Hamstring 5/5  Anterior tibial 5/5  Gastrocnemius 5/5  EHL 5/5    No spasticity    Reflexes reported right over left  Biceps 2/2  Triceps 2/2  Knee 2/2  Ankle 2/2  Toes downgoing  Negative Escoto reflex    Gait  Normal gait  Can tandem gait with eyes open  Romberg plus minus instability        Assessment/plan    1.  Acute onset of paresthesias in the feet/legs and hands bilaterally it came on fairly quickly       Previous chronic numbness of the right face        Some history of 3-day headache a week before       Some jiggly vision in the past question oscillopsia       Retained reflexes         Question demyelinating disease and with involvement of cranial nerves and face concern for MS    2.  Does have some history that she has to shake her hands to get the sensation back into them       Has hyperextensible joints       Reflexes retained normal strength and gait right now         Less likely GBS         3.  History of migraines chronic       Mother and maternal grandmother with migraines    4.   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  Demonstrates no stenosis of the major intracranial         arteries. 2.3 mm posteriorly oriented saccular outpouching arising         from posterior communicating artery origin, which can be a small          aneurysm vs dilated infundibulum.        Diagnosis  Symmetrical paresthesias rule out MS  Rule out neuropathy    MRI head nonspecific T2 flair changes consistent with migraine headache history  MRI cervical/thoracic/lumbar spine no compression no cord lesions  CT head/CTA, 2.3 mm posterior communicating artery infundibulum versus aneurysm  CSF 9/1/2023 negative spinal fluid normal protein glucose and cell counts      EMG right arm and right leg with F waves  Patient has a relatively benign exam at this time fully ambulatory no shortness of breath no progressive symptoms    Follow-up at the time of the EMG November 22, 2022    Current plan  For post LP/status migraine headache    Nortriptyline 10 mg tablet 2 tabs p.o. nightly preventative therapy (started 9/6/2023)  Maxalt 10 mg tablet 1 p.o. daily as needed severe headache (started 9/6/2023)  Butalbital/acetaminophen/caffeine, 1-2 tabs p.o. every 8 hours as needed headache #30 ( filled 9/6/2023), post LP headache  Lie flat for 2-3 days stay well-hydrated, for post LP headache  Has family members at her home to  help with her 3 small children that will need care so that she can be at strict bedrest  Discussed with patient through  all of these recommendations and the purpose of staying flat for extended amount of time to help the headache improve    Previously was in the ER and they decided against any blood patch due to slightly elevated white blood count in the periphery  Patient with no diarrhea no fever chills no sore throat  Does feel better when she is laying down  Does appear that she does have a post LP headache along with her significant history for migraine is probably much worse due to that recent.    Total care time today discussing multiple issues reviewing multiple results as above 58 minutes      As part of visit 9/6/2023  Reviewed MRI cervical thoracic and lumbar spine  Reviewed spinal tap results  Reviewed 2 ER visits 9/1/2023 and 9/3/2023    B1 pending  EMG pending 11/22/2023  Follow-up visit pending 11/22/2023    Addendum 11/1/2023  EMG [facility] 10/5/2023  Right median and right peroneal F wave  absent otherwise normal study

## 2023-09-06 NOTE — PROGRESS NOTES
Assessment & Plan     Spinal puncture headache  My first visit with patient and she has a very complex recent medical history reviewed with her and in her chart.  Spontaneously developed significant numbness to both lower extremities.  Has seen neurology and actually has a neurology follow-up later today.  As part of the work-up of this she had a spinal tap done and since then has had a significant headache.  Has been to the emergency department a couple of times and only basic treatment was given.  Today she has severe headache especially with sitting up or movement.  Lots of nausea associated with the headache.  The best we have to offer is Toradol and Zofran injection and these were given with only a little bit of relief.  But I think this is a much more complex issue and she can discuss with neurology including the possibility of blood patch.  This was evidently discussed at the ER but they did not want to mess up in the neurologic evaluation.  - ketorolac (TORADOL) injection 60 mg    Nausea  As above.  Slight relief.  Primarily headache related.  - ondansetron (ZOFRAN) injection 4 mg  - ondansetron (ZOFRAN ODT) 4 MG ODT tab; Take 1 tablet (4 mg) by mouth every 8 hours as needed for nausea    Paresthesias  Ongoing evaluation being done for this issue as it is pretty complex.  Neurology appointment today.       See Patient Instructions    Keira Anderson MD  [facility] DANIELLA Freeman is a 27 year old, presenting for the following health issues:  RECHECK (Follow up on headaches. Has been to ED 3 times in past week for this. )        9/6/2023    10:50 AM   Additional Questions   Roomed by Gladys NEWMAN   Accompanied by  (Demetrius)         9/6/2023    10:50 AM   Patient Reported Additional Medications   Patient reports taking the following new medications None       History of Present Illness       Headaches:   Since the patient's last clinic visit, headaches are:  "worsened  The patient is getting headaches:  Not often  She is not able to do normal daily activities when she has a migraine.  The patient is taking the following rescue/relief medications:  Tylenol and other   Patient states \"I get some relief\" from the rescue/relief medications.   The patient is taking the following medications to prevent migraines:  Other  In the past 4 weeks, the patient has gone to an Urgent Care or Emergency Room 3 or more times times due to headaches.    She eats 2-3 servings of fruits and vegetables daily.She consumes 3 sweetened beverage(s) daily.She exercises with enough effort to increase her heart rate 20 to 29 minutes per day.  She exercises with enough effort to increase her heart rate 5 days per week.   She is taking medications regularly.         Here today with her  for severe headache and nausea seemingly related to recent lumbar puncture      Review of Systems   Constitutional, HEENT, cardiovascular, pulmonary, gi and gu systems are negative, except as otherwise noted.      Objective    BP 95/58 (BP Location: Right arm, Patient Position: Left side, Cuff Size: Adult Small)   Pulse 69   Temp 98.8  F (37.1  C) (Tympanic)   Resp 12   SpO2 99%   There is no height or weight on file to calculate BMI.  Physical Exam   Alert and well nourished and hydrated.  But laying down on the exam table.  Very uncomfortable to move much  S1 and S2 normal, no murmurs, clicks, gallops or rubs. Regular rate and rhythm. Chest is clear; no wheezes or rales. No edema or JVD.  Past labs reviewed with the patient.   Reviewed recent imaging                      "

## 2023-09-06 NOTE — PROGRESS NOTES
Patient was given keterolac 60mg and ondansetron 4mg. Prior to medication administration, verified patient's identity using patient s name and date of birth. Please see MAR and medication order for additional information. Patient instructed to remain in clinic for 15 minutes and report any adverse reaction to staff immediately.    Vial/Syringe: Single dose vial. Was entire vial of medication used? Yes    Gladys Feliz MA on 9/6/2023 at 11:46 AM

## 2023-09-06 NOTE — LETTER
9/6/2023         RE: Pam Thompson  [address]  [facility] [address]        Dear Colleague,    Thank you for referring your patient, Pam Thompson, to the [facility] [place]. Please see a copy of my visit note below.    In person evaluation        HPI  8/29/2023, in person consultation  9/6/2023, in person visit (emergency add-on)        27-year-old been evaluated neurologically for:  Sensory changes in her legs and hands acute onset  Migraines with severe 3-day headache prior to these events    Patient added on emergently 9/6/2023  Has had difficulty with paresthesias now with headache that is probably more of a post LP headache  Brief summary of events since last seen  Fluctuating numbness progressive numbness from the knees up to the thighs worse when she sits for prolonged times  Had some chronic low back pain back when she had the birth of her child but that was distant and does not have back pain now  No specific weakness  No bowel or bladder difficulties although has a little bit of constipation  No fever or chills    Return to the ER on 9/1/2023 with complaints of some progressive numbness but still had reflexes  They performed a lumbar puncture  Had significant headache after the lumbar puncture  Lumbar puncture spinal fluid was benign no elevation of protein cell counts were okay    Return to the ER on 9/3/2023  Severe headache originally was frontal but then was global and posterior  Had nausea and vomiting with a headache worse when she was sitting up better when she laid down  Had signs and symptoms consistent with a spinal leak headache  Was going to get a blood patch but anesthesia thought it was too risky because her peripheral white count was running around 13 and they were concerned about infection  No stiff neck no meningismus no fever or chills no diarrhea    Had seen her primary with treatment of Zofran and Toradol injection earlier 9/6/2023 but not  getting better  Called our office and had called our office over the last week or so almost every day with symptoms and signs    Due to significant symptoms I had her come to the office urgently so that I could do an actual exam on her.  Her neurologic exam is stable  She has no cranial nerve abnormalities  Her mental status is normal  Her neck is supple  Reflexes are intact  She is ambulatory  Her proximal and distal strength is good    At this time I feel she has:  Paresthesias  Post spinal tap headache  Migraine headache/status migrainous  CTA showed small posterior communicating artery infundibula versus aneurysm 2.3 mm  CSF 9/1/2023 negative for hemorrhage or Guillain-Barré syndrome            Medical condition history onset: 8/29/2023 visit  Patient states that August 22, 2023 ago she had a 3-day bad headache with nausea but no vomiting  Had to get up and take care of the kids though no neurologic changes  Has had some chronic burning dysesthesias on the right side of her face  No history of visual loss  Had an episode of Jumbled vision unclear if this was oscillopsia but it was not associated with vertigo    Then August 27, 2023 watching a movie became numb in her feet up to her knee bilaterally came on over minutes  Took about an hour to get to the ER and then both hands seem to be numb  Numbness is in the hands and hand in the feet and legs    No bowel or bladder difficulty  He is able to ambulate  Except for some chronic numbness on the right face no new cranial nerve changes    Does have a history of migraines but no specific headache with the symptoms            A.  Patient with sudden onset bilateral leg numbness 8/27/2023       Onset of numbness occurred while seated on the couch was able to ambulate       No recent URI/GI symptoms or vaccinations       Did have a 3-day headache a week prior to this         Has 3 children one of them with a rash not sure if it was a viral syndrome         In the past  has had vision jiggling x1 hour at night but no true vertigo or nausea vomiting       In the past has had some numbness on the right side of her face       Sometimes might have a little bit of trouble with swallowing her saliva chronic         Hyperextensible joints      B.  History of migraine headaches       Frequency 1/month        Duration 2-5 hours        No loss of vision    C.  Severe headache lasting 3 days August 21, 2023        Had nausea no vomiting felt sick but has 3 kids had to get up and do things    D.  Post LP headache (LP 9/1/2023)      Past medical history  GERD  Migraines      Habits  Non-smoker  Does not drink alcohol  3 children ages 8, 5 and 2 (8/29/2023)    Family history  Father diabetes  Mother migraines  Paternal grandmother migraines      Work-up  Laboratory data 8/29/2023  MRI head 8/30/2023  1.  Two punctate nonenhancing foci of T2 prolongation within the subcortical white matter of the bilateral frontal lobes,         of doubtful clinical significance.  2.  Otherwise, unremarkable brain MRI.  CSF 9/1/2023, WBC 3, RBC 9, protein 21.7, glucose 52,  CT scan head 9/3/2023  1. No evidence of intracranial hemorrhage.  Head CTA 9/3/2023  1.  Demonstrates no stenosis of the major intracranial  arteries. 2.3 mm posteriorly oriented saccular outpouching arising  from posterior communicating artery origin, which can be a small  aneurysm vs dilated infundibulum.  Neck CTA demonstrates no stenosis of the major cervical arteries.  MRI cervical spine/thoracic/lumbar spine 9/1/2023.  (See official report)  Impression:   1.  Subtle disc bulges at C3-4, C4-5 and C5-6.   2.  No spinal canal or neural foraminal stenosis at any level.   3.  Normal cervical and thoracic spinal cord signal.   4.  No suspicious enhancement in the spinal       neuraxis to explain symptoms.  Laboratory data review                     8/2023 9/1/2023    NA/K           143/3.7  BUN/CR      17.9/0.99  GLU              108  AST              26  WBC/HGB    7.7/13.7  PLTs             381,000  hCG            negative    B12             801  NARAYAN            negative    CRP/ESR                                     <3.00/11    Exam  See above for recent events  Review of systems pertinent positive negatives  No diplopia no dysarthria no true dysphagia  May have some trouble swallowing saliva but that has been going on on and off for a long time does not cough with eating    History of migraine headaches see above  No true vision loss  No true vertigo    Had episode of tingling vision lasting an hour at night where things seem to bother up and down    No chest pain no shortness of breath no nausea vomiting    No bowel or bladder difficulty    Did have numbness on the right side of her face going on for years mild burning type sensation    No true weakness    Otherwise review systems negative    General exam  100/70, pulse 59  Alert orient x3   helps with translation  HEENT normal/neck supple  Lungs clear  Heart rate regular  Abdomen soft positive bowel sounds  Symmetrical pulses  No edema the feet    Hyperextensible joints    Neurologic exam  Alert orient x3  Normal prosody speech  Normal naming  Normal comprehension  Normal repetition  No aphasia  No neglect  Memory recall okay    Cranial nerves II through XII  No ophthalmoplegia  No nystagmus  Optic fundi good no papilledema  Visual fields intact  Face symmetrical  Speech and tongue twisters okay    Upper extremities reported right over left  Deltoid 5/5  Triceps 5/5  Biceps 5/5  Wrist/finger flexors 5/5  Wrist/finger extensors 5/5  Intrinsic strength 5/5    No drift  No tremor  Rapid alternating movements good    Lower extremities  Iliopsoas 5/5  Quadriceps 5/5  Hamstring 5/5  Anterior tibial 5/5  Gastrocnemius 5/5  EHL 5/5    No spasticity    Reflexes reported right over left  Biceps 2/2  Triceps 2/2  Knee 2/2  Ankle 2/2  Toes downgoing  Negative Escoto  reflex    Gait  Normal gait  Can tandem gait with eyes open  Romberg plus minus instability        Assessment/plan    1.  Acute onset of paresthesias in the feet/legs and hands bilaterally it came on fairly quickly       Previous chronic numbness of the right face       Some history of 3-day headache a week before       Some jiggly vision in the past question oscillopsia       Retained reflexes         Question demyelinating disease and with involvement of cranial nerves and face concern for MS    2.  Does have some history that she has to shake her hands to get the sensation back into them       Has hyperextensible joints       Reflexes retained normal strength and gait right now         Less likely GBS         3.  History of migraines chronic       Mother and maternal grandmother with migraines    4.   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  Demonstrates no stenosis of the major intracranial         arteries. 2.3 mm posteriorly oriented saccular outpouching arising         from posterior communicating artery origin, which can be a small          aneurysm vs dilated infundibulum.        Diagnosis  Symmetrical paresthesias rule out MS  Rule out neuropathy    MRI head nonspecific T2 flair changes consistent with migraine headache history  MRI cervical/thoracic/lumbar spine no compression no cord lesions  CT head/CTA, 2.3 mm posterior communicating artery infundibulum versus aneurysm  CSF 9/1/2023 negative spinal fluid normal protein glucose and cell counts      EMG right arm and right leg with F waves  Patient has a relatively benign exam at this time fully ambulatory no shortness of breath no progressive symptoms    Follow-up at the time of the EMG November 22, 2022    Current plan  For post LP/status migraine headache    Nortriptyline 10 mg tablet 2 tabs p.o. nightly preventative therapy (started 9/6/2023)  Maxalt 10 mg tablet 1 p.o. daily  as needed severe headache (started 9/6/2023)  Butalbital/acetaminophen/caffeine, 1-2 tabs p.o. every 8 hours as needed headache #30 ( filled 9/6/2023), post LP headache  Lie flat for 2-3 days stay well-hydrated, for post LP headache  Has family members at her home to help with her 3 small children that will need care so that she can be at strict bedrest  Discussed with patient through  all of these recommendations and the purpose of staying flat for extended amount of time to help the headache improve    Previously was in the ER and they decided against any blood patch due to slightly elevated white blood count in the periphery  Patient with no diarrhea no fever chills no sore throat  Does feel better when she is laying down  Does appear that she does have a post LP headache along with her significant history for migraine is probably much worse due to that recent.    Total care time today discussing multiple issues reviewing multiple results as above 58 minutes      As part of visit 9/6/2023  Reviewed MRI cervical thoracic and lumbar spine  Reviewed spinal tap results  Reviewed 2 ER visits 9/1/2023 and 9/3/2023    B1 pending  EMG pending 11/22/2023  Follow-up visit pending 11/22/2023          Again, thank you for allowing me to participate in the care of your patient.        Sincerely,        emelina Okeefe MD

## 2023-09-06 NOTE — TELEPHONE ENCOUNTER
With so many concerns and difficulty since last seen please add patient on at 1 PM emergently add-on patient 9/6/2023.  emelina Okeefe MD on 9/6/2023 at 9:40 AM

## 2023-09-06 NOTE — TELEPHONE ENCOUNTER
[facility] Call Center    Phone Message    May a detailed message be left on voicemail: yes     Reason for Call: Other: Pt spouse is calling because he has not heard anything back about his previous message. Please call spouse to discuss     Action Taken: Other: [facility] neurology    Travel Screening: Not Applicable

## 2023-09-06 NOTE — NURSING NOTE
Chief Complaint   Patient presents with    Follow Up     Numbness/tingling/buzzing/headache/dizziness started last Sunday. This is all the time. Headache today is very bad.     Luisa Pedraza LPN on 9/6/2023 at 1:02 PM

## 2023-09-07 LAB — VIT B1 PYROPHOSHATE BLD-SCNC: 146 NMOL/L

## 2023-09-08 ENCOUNTER — TELEPHONE (OUTPATIENT)
Dept: FAMILY MEDICINE | Facility: CLINIC | Age: 28
End: 2023-09-08

## 2023-09-08 DIAGNOSIS — G43.111 INTRACTABLE MIGRAINE WITH AURA WITH STATUS MIGRAINOSUS: Primary | ICD-10-CM

## 2023-09-08 NOTE — TELEPHONE ENCOUNTER
Order/Referral Request    Who is requesting: Pt's      Reason service is needed/diagnosis: Neurologist at [facility] for a 2nd opinion       Has this been discussed with Provider: Yes    Does patient have a preference on a Group/Provider/Facility? [facility]     Does patient have an appointment scheduled?: Yes but it is far out so they are requesting a referral with urgency so they can get a sooner appt    Where to send orders: Fax to [facility] in [place] Fax # [phone]    Could we send this information to you in [employer] or would you prefer to receive a phone call?:   Patient would prefer a phone call   Okay to leave a detailed message?: Yes at Cell number on file:    Telephone Information:   Mobile [phone]

## 2023-09-12 DIAGNOSIS — R11.0 NAUSEA: ICD-10-CM

## 2023-09-13 ENCOUNTER — MYC MEDICAL ADVICE (OUTPATIENT)
Dept: FAMILY MEDICINE | Facility: CLINIC | Age: 28
End: 2023-09-13

## 2023-09-13 RX ORDER — ONDANSETRON 4 MG/1
4 TABLET, ORALLY DISINTEGRATING ORAL EVERY 8 HOURS PRN
Qty: 11 TABLET | Refills: 1 | Status: SHIPPED | OUTPATIENT
Start: 2023-09-13 | End: 2023-11-22

## 2023-09-13 NOTE — TELEPHONE ENCOUNTER
Refill request for ondansetron.  Directions: : Take 1 tablet (4 mg) by mouth every 8 hours as needed for nausea     LOV: 09/06/23  NOV: 11/22/23    30 day supply with 1 refills Medication T'd for review and signature      Luisa Pedraza LPN on 9/13/2023 at 11:22 AM

## 2023-09-18 ENCOUNTER — MYC MEDICAL ADVICE (OUTPATIENT)
Dept: NEUROLOGY | Facility: CLINIC | Age: 28
End: 2023-09-18

## 2023-09-19 NOTE — TELEPHONE ENCOUNTER
Pt reports resolution in headache. Buzzing and tingling symptoms still present but reduced. She is struggling with mood swings, dizziness when moving fast, and uncomfortable feeling in eyes     She discontinued Nortriptyline a week ago thinking it was contributing to dizziness, but she is still having symptoms. Currently taking Gabapentin 100mg TID. Please advise.     Ryan Novoa RN, BSN  [facility]

## 2023-09-20 NOTE — TELEPHONE ENCOUNTER
I do not think that the nortriptyline was causing the symptoms could go back on the medication as planned no new medications at this time otherwise, may take time to heal up healing may be slow.  Keep follow-up visit 11/22/2023  emelina Okeefe MD on 9/20/2023 at 4:26 PM

## 2023-10-05 ENCOUNTER — TRANSFERRED RECORDS (OUTPATIENT)
Dept: HEALTH INFORMATION MANAGEMENT | Facility: CLINIC | Age: 28
End: 2023-10-05

## 2023-10-08 ENCOUNTER — HEALTH MAINTENANCE LETTER (OUTPATIENT)
Age: 28
End: 2023-10-08

## 2023-11-21 NOTE — PROGRESS NOTES
"In person evaluation        HPI  8/29/2023, in person consultation  9/6/2023, in person visit (emergency add-on)  11/22/2023, in person visit      27-year-old been evaluated neurologically for:  Sensory changes in her legs and hands acute onset  Migraines with severe 3-day headache prior to these events  Post LP spinal headache 9/1/2023    Review of patient's difficulty with paresthesias and sensory changes:  Patient with URI August 22, 2023 August 27, 2023 numbness hands and feet concern for acute neuropathy  Went back to the ER had lumbar puncture 9/1/2023 fairly close to onset of symptoms protein is normal  Went to the [facility] clinic 10/5/2023 he had EMG that was normal except for \"absent F waves\"  Repeat EMG 11/22/2023 normal including F waves present.  (3 months after onset of symptoms)    Pattern of changes above could be Guillain-Barré mild form  With the resolution of the changes on EMG suspect that this was an acute polyradiculoneuropathy    Patient with residual tingling and buzzing sometimes worse at the end of the day  Sometimes worse with activity  It is improved compared to previous  LP was done very early after 3 to 4 days of symptoms  EMG that showed absent F waves was done at 8 weeks  Normal EMG done at 3 months    Still has migraine headaches  Had stopped Neurontin and nortriptyline  After discussion decided to go back on nortriptyline 10 mg once at nighttime for migraines          Past history review  Patient added on emergently 9/6/2023  Has had difficulty with paresthesias now with headache that is probably more of a post LP headache  Brief summary of events since last seen  Fluctuating numbness progressive numbness from the knees up to the thighs worse when she sits for prolonged times  Had some chronic low back pain back when she had the birth of her child but that was distant and does not have back pain now  No specific weakness  No bowel or bladder difficulties although has a little bit of " constipation  No fever or chills    Return to the ER on 9/1/2023 with complaints of some progressive numbness but still had reflexes  They performed a lumbar puncture  Had significant headache after the lumbar puncture  Lumbar puncture spinal fluid was benign no elevation of protein cell counts were okay    Return to the ER on 9/3/2023  Severe headache originally was frontal but then was global and posterior  Had nausea and vomiting with a headache worse when she was sitting up better when she laid down  Had signs and symptoms consistent with a spinal leak headache  Was going to get a blood patch but anesthesia thought it was too risky because her peripheral white count was running around 13 and they were concerned about infection  No stiff neck no meningismus no fever or chills no diarrhea    Had seen her primary with treatment of Zofran and Toradol injection earlier 9/6/2023 but not getting better  Called our office and had called our office over the last week or so almost every day with symptoms and signs    Due to significant symptoms I had her come to the office urgently so that I could do an actual exam on her.  Her neurologic exam is stable  She has no cranial nerve abnormalities  Her mental status is normal  Her neck is supple  Reflexes are intact  She is ambulatory  Her proximal and distal strength is good    At this time I feel she has:  Paresthesias  Post spinal tap headache  Migraine headache/status migrainous  CTA showed small posterior communicating artery infundibula versus aneurysm 2.3 mm  CSF 9/1/2023 negative for hemorrhage or Guillain-Barré syndrome            Medical condition history onset: 8/29/2023 visit  Patient states that August 22, 2023 ago she had a 3-day bad headache with nausea but no vomiting  Had to get up and take care of the kids though no neurologic changes  Has had some chronic burning dysesthesias on the right side of her face  No history of visual loss  Had an episode of Jumbled  vision unclear if this was oscillopsia but it was not associated with vertigo    Then August 27, 2023 watching a movie became numb in her feet up to her knee bilaterally came on over minutes  Took about an hour to get to the ER and then both hands seem to be numb  Numbness is in the hands and hand in the feet and legs    No bowel or bladder difficulty  He is able to ambulate  Except for some chronic numbness on the right face no new cranial nerve changes    Does have a history of migraines but no specific headache with the symptoms            A.  Patient with sudden onset bilateral leg numbness 8/27/2023       Onset of numbness occurred while seated on the couch was able to ambulate       No recent URI/GI symptoms or vaccinations       Did have a 3-day headache a week prior to this         Has 3 children one of them with a rash not sure if it was a viral syndrome         In the past has had vision jiggling x1 hour at night but no true vertigo or nausea vomiting       In the past has had some numbness on the right side of her face       Sometimes might have a little bit of trouble with swallowing her saliva chronic         Hyperextensible joints      B.  History of migraine headaches       Frequency 1/month        Duration 2-5 hours        No loss of vision    C.  Severe headache lasting 3 days August 21, 2023        Had nausea no vomiting felt sick but has 3 kids had to get up and do things    D.  Post LP headache (LP 9/1/2023)      Past medical history  GERD  Migraines      Habits  Non-smoker  Does not drink alcohol  3 children ages 8, 5 and 2 (8/29/2023)    Family history  Father diabetes  Mother migraines  Paternal grandmother migraines      Work-up  Laboratory data 8/29/2023  MRI head 8/30/2023  1.  Two punctate nonenhancing foci of T2 prolongation within the subcortical white matter of the bilateral frontal lobes,         of doubtful clinical significance.  2.  Otherwise, unremarkable brain MRI.  CSF 9/1/2023,  WBC 3, RBC 9, protein 21.7, glucose 52,  CT scan head 9/3/2023  1. No evidence of intracranial hemorrhage.  Head CTA 9/3/2023  1.  Demonstrates no stenosis of the major intracranial  arteries. 2.3 mm posteriorly oriented saccular outpouching arising  from posterior communicating artery origin, which can be a small  aneurysm vs dilated infundibulum.  Neck CTA demonstrates no stenosis of the major cervical arteries.  MRI cervical spine/thoracic/lumbar spine 9/1/2023.  (See official report)  Impression:   1.  Subtle disc bulges at C3-4, C4-5 and C5-6.   2.  No spinal canal or neural foraminal stenosis at any level.   3.  Normal cervical and thoracic spinal cord signal.   4.  No suspicious enhancement in the spinal neuraxis to explain symptoms.    EMG [facility] 10/5/2023, (8 weeks after symptom onset)  Right median and right peroneal F wave  absent otherwise normal study  EMG 11/22/2023 (3 months after onset of symptoms)  Normal EMG of the right upper and lower extremity F waves are present in both the upper and lower extremity.      Laboratory data review                     8/2023 9/1/2023    NA/K           143/3.7  BUN/CR      17.9/0.99  GLU             108  AST              26  WBC/HGB    7.7/13.7  PLTs             381,000  hCG            negative    B12             801  NARAYAN            negative  B1                                                 146  CRP/ESR                                     <3.00/11    Exam  See above for recent events  Review of systems pertinent positive negatives  No diplopia no dysarthria no true dysphagia      History of migraine headaches see above  No true vision loss  No true vertigo  Some residual tingling and numbness comes and goes less bad compared to previous    No chest pain no shortness of breath no nausea vomiting  No bowel or bladder difficulty    No weakness    Otherwise review of systems negative    General exam  Blood pressure 103/70, pulse 67  Alert  "orient x3   helps with translation  HEENT normal/neck supple  Lungs clear  Heart rate regular  Abdomen soft positive bowel sounds  Symmetrical pulses  No edema the feet    Hyperextensible joints    Neurologic exam  Alert orient x3  Normal prosody speech  Normal naming  Normal comprehension  Normal repetition  No aphasia  No neglect  Memory recall okay    Cranial nerves II through XII  No ophthalmoplegia  No nystagmus  Optic fundi good no papilledema  Visual fields intact  Face symmetrical  Speech and tongue twisters okay    Upper extremities reported right over left  Deltoid 5/5  Triceps 5/5  Biceps 5/5  Wrist/finger flexors 5/5  Wrist/finger extensors 5/5  Intrinsic strength 5/5    No drift  No tremor  Rapid alternating movements good    Lower extremities  Iliopsoas 5/5  Quadriceps 5/5  Hamstring 5/5  Anterior tibial 5/5  Gastrocnemius 5/5  EHL 5/5    No spasticity    Reflexes reported right over left  Biceps 2/2  Triceps 2/2  Knee 2/2  Ankle 2/2  Toes downgoing  Negative Escoto reflex    Gait  Normal gait  Can tandem gait with eyes open  Romberg negative        Assessment/plan    1.  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 October 22, 2023         Acute onset of paresthesias in the feet/legs and hands bilaterally it came on fairly quickly       Previous chronic numbness of the right face       Some history of 3-day headache a week before       Some jiggly vision in the past question oscillopsia       Retained reflexes         August 22, 2023 upper respiratory infection       August 27, 2023 paresthesias       September 1, 2023 LP with normal CSF (probably done too early in disease process) patient had been in the ER frequently and they tapped her due to \"increasing symptoms\"       October 5, 2023 EMG with absent F waves at the [facility] (8 weeks into disease process)       November 22, 2020 3 repeat EMG normal study with normal F waves in the right upper and " right lower extremity       Retained reflexes/normal strength/normal gait/normal neuro exam      2.  Does have some history that she has to shake her hands to get the sensation back into them       Has hyperextensible joints       Reflexes retained normal strength and gait right now                  3.  History of migraines chronic       Mother and maternal grandmother with migraines    4.   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  Demonstrates no stenosis of the major intracranial         arteries. 2.3 mm posteriorly oriented saccular outpouching arising         from posterior communicating artery origin, which can be a small          aneurysm vs dilated infundibulum.        Diagnosis  GBS onset August 27, 2023 normal EMG November 22, 2023  Migraine headaches    MRI head nonspecific T2 flair changes consistent with migraine headache history  MRI cervical/thoracic/lumbar spine no compression no cord lesions      Nortriptyline 10 mg 1 p.o. nightly patient will use more as needed of her choice for migraine headaches  Did not want to be on Neurontin for the paresthesias from her GBS which is improved now on EMG  Reviewed her neurologic presentation from late August through late November  Reviewed work-up labs CSF and multiple EMG testing    We did discuss that the paresthesias could be residual may improve with time    Patient will follow-up June 2024 for migraine headaches    Total care time today 41 minutes (2 separate neurologic conditions discussed and reviewed) medication filled

## 2023-11-22 ENCOUNTER — OFFICE VISIT (OUTPATIENT)
Dept: NEUROLOGY | Facility: CLINIC | Age: 28
End: 2023-11-22
Payer: COMMERCIAL

## 2023-11-22 ENCOUNTER — OFFICE VISIT (OUTPATIENT)
Dept: NEUROLOGY | Facility: CLINIC | Age: 28
End: 2023-11-22
Attending: PSYCHIATRY & NEUROLOGY
Payer: COMMERCIAL

## 2023-11-22 VITALS
HEART RATE: 67 BPM | WEIGHT: 114 LBS | DIASTOLIC BLOOD PRESSURE: 70 MMHG | HEIGHT: 61 IN | BODY MASS INDEX: 21.52 KG/M2 | SYSTOLIC BLOOD PRESSURE: 103 MMHG

## 2023-11-22 DIAGNOSIS — G61.0 GUILLAIN-BARRE SYNDROME (H): Primary | ICD-10-CM

## 2023-11-22 DIAGNOSIS — R20.2 PARESTHESIAS: ICD-10-CM

## 2023-11-22 DIAGNOSIS — R51.9 SEVERE HEADACHE: ICD-10-CM

## 2023-11-22 DIAGNOSIS — G43.111 INTRACTABLE MIGRAINE WITH AURA WITH STATUS MIGRAINOSUS: ICD-10-CM

## 2023-11-22 PROCEDURE — 95886 MUSC TEST DONE W/N TEST COMP: CPT | Mod: RT | Performed by: PSYCHIATRY & NEUROLOGY

## 2023-11-22 PROCEDURE — 99215 OFFICE O/P EST HI 40 MIN: CPT | Performed by: PSYCHIATRY & NEUROLOGY

## 2023-11-22 PROCEDURE — 95911 NRV CNDJ TEST 9-10 STUDIES: CPT | Performed by: PSYCHIATRY & NEUROLOGY

## 2023-11-22 RX ORDER — NORTRIPTYLINE HCL 10 MG
10 CAPSULE ORAL AT BEDTIME
Qty: 30 CAPSULE | Refills: 11 | Status: SHIPPED | OUTPATIENT
Start: 2023-11-22 | End: 2024-01-14

## 2023-11-22 NOTE — NURSING NOTE
Chief Complaint   Patient presents with    Results     Discuss EMG results. Stopped taking gabapentin and nortriptyline     Luisa Pedraza LPN on 11/22/2023 at 1:36 PM

## 2023-11-22 NOTE — LETTER
11/22/2023         RE: Pam Thompson  [address]  [place] [facility] [facility] [place] [address]        Dear Colleague,    Thank you for referring your patient, Pam Thompson, to the [facility] [place]. Please see a copy of my visit note below.    See EMG report      Again, thank you for allowing me to participate in the care of your patient.        Sincerely,        emelina Okeefe MD

## 2023-11-22 NOTE — LETTER
"    11/22/2023         RE: Pam Thompson  [address]  [place] [address]        Dear Colleague,    Thank you for referring your patient, Pam Thompson, to the [facility] [place]. Please see a copy of my visit note below.    In person evaluation        HPI  8/29/2023, in person consultation  9/6/2023, in person visit (emergency add-on)  11/22/2023, in person visit      27-year-old been evaluated neurologically for:  Sensory changes in her legs and hands acute onset  Migraines with severe 3-day headache prior to these events  Post LP spinal headache 9/1/2023    Review of patient's difficulty with paresthesias and sensory changes:  Patient with URI August 22, 2023 August 27, 2023 numbness hands and feet concern for acute neuropathy  Went back to the ER had lumbar puncture 9/1/2023 fairly close to onset of symptoms protein is normal  Went to the [facility] 10/5/2023 he had EMG that was normal except for \"absent F waves\"  Repeat EMG 11/22/2023 normal including F waves present.  (3 months after onset of symptoms)    Pattern of changes above could be Guillain-Barré mild form  With the resolution of the changes on EMG suspect that this was an acute polyradiculoneuropathy    Patient with residual tingling and buzzing sometimes worse at the end of the day  Sometimes worse with activity  It is improved compared to previous  LP was done very early after 3 to 4 days of symptoms  EMG that showed absent F waves was done at 8 weeks  Normal EMG done at 3 months    Still has migraine headaches  Had stopped Neurontin and nortriptyline  After discussion decided to go back on nortriptyline 10 mg once at nighttime for migraines          Past history review  Patient added on emergently 9/6/2023  Has had difficulty with paresthesias now with headache that is probably more of a post LP headache  Brief summary of events since last seen  Fluctuating numbness progressive numbness from the knees up to " the thighs worse when she sits for prolonged times  Had some chronic low back pain back when she had the birth of her child but that was distant and does not have back pain now  No specific weakness  No bowel or bladder difficulties although has a little bit of constipation  No fever or chills    Return to the ER on 9/1/2023 with complaints of some progressive numbness but still had reflexes  They performed a lumbar puncture  Had significant headache after the lumbar puncture  Lumbar puncture spinal fluid was benign no elevation of protein cell counts were okay    Return to the ER on 9/3/2023  Severe headache originally was frontal but then was global and posterior  Had nausea and vomiting with a headache worse when she was sitting up better when she laid down  Had signs and symptoms consistent with a spinal leak headache  Was going to get a blood patch but anesthesia thought it was too risky because her peripheral white count was running around 13 and they were concerned about infection  No stiff neck no meningismus no fever or chills no diarrhea    Had seen her primary with treatment of Zofran and Toradol injection earlier 9/6/2023 but not getting better  Called our office and had called our office over the last week or so almost every day with symptoms and signs    Due to significant symptoms I had her come to the office urgently so that I could do an actual exam on her.  Her neurologic exam is stable  She has no cranial nerve abnormalities  Her mental status is normal  Her neck is supple  Reflexes are intact  She is ambulatory  Her proximal and distal strength is good    At this time I feel she has:  Paresthesias  Post spinal tap headache  Migraine headache/status migrainous  CTA showed small posterior communicating artery infundibula versus aneurysm 2.3 mm  CSF 9/1/2023 negative for hemorrhage or Guillain-Barré syndrome            Medical condition history onset: 8/29/2023 visit  Patient states that August 22,  2023 ago she had a 3-day bad headache with nausea but no vomiting  Had to get up and take care of the kids though no neurologic changes  Has had some chronic burning dysesthesias on the right side of her face  No history of visual loss  Had an episode of Jumbled vision unclear if this was oscillopsia but it was not associated with vertigo    Then August 27, 2023 watching a movie became numb in her feet up to her knee bilaterally came on over minutes  Took about an hour to get to the ER and then both hands seem to be numb  Numbness is in the hands and hand in the feet and legs    No bowel or bladder difficulty  He is able to ambulate  Except for some chronic numbness on the right face no new cranial nerve changes    Does have a history of migraines but no specific headache with the symptoms            A.  Patient with sudden onset bilateral leg numbness 8/27/2023       Onset of numbness occurred while seated on the couch was able to ambulate       No recent URI/GI symptoms or vaccinations       Did have a 3-day headache a week prior to this         Has 3 children one of them with a rash not sure if it was a viral syndrome         In the past has had vision jiggling x1 hour at night but no true vertigo or nausea vomiting       In the past has had some numbness on the right side of her face       Sometimes might have a little bit of trouble with swallowing her saliva chronic         Hyperextensible joints      B.  History of migraine headaches       Frequency 1/month        Duration 2-5 hours        No loss of vision    C.  Severe headache lasting 3 days August 21, 2023        Had nausea no vomiting felt sick but has 3 kids had to get up and do things    D.  Post LP headache (LP 9/1/2023)      Past medical history  GERD  Migraines      Habits  Non-smoker  Does not drink alcohol  3 children ages 8, 5 and 2 (8/29/2023)    Family history  Father diabetes  Mother migraines  Paternal grandmother  migraines      Work-up  Laboratory data 8/29/2023  MRI head 8/30/2023  1.  Two punctate nonenhancing foci of T2 prolongation within the subcortical white matter of the bilateral frontal lobes,         of doubtful clinical significance.  2.  Otherwise, unremarkable brain MRI.  CSF 9/1/2023, WBC 3, RBC 9, protein 21.7, glucose 52,  CT scan head 9/3/2023  1. No evidence of intracranial hemorrhage.  Head CTA 9/3/2023  1.  Demonstrates no stenosis of the major intracranial  arteries. 2.3 mm posteriorly oriented saccular outpouching arising  from posterior communicating artery origin, which can be a small  aneurysm vs dilated infundibulum.  Neck CTA demonstrates no stenosis of the major cervical arteries.  MRI cervical spine/thoracic/lumbar spine 9/1/2023.  (See official report)  Impression:   1.  Subtle disc bulges at C3-4, C4-5 and C5-6.   2.  No spinal canal or neural foraminal stenosis at any level.   3.  Normal cervical and thoracic spinal cord signal.   4.  No suspicious enhancement in the spinal neuraxis to explain symptoms.    EMG [facility] 10/5/2023, (8 weeks after symptom onset)  Right median and right peroneal F wave  absent otherwise normal study  EMG 11/22/2023 (3 months after onset of symptoms)  Normal EMG of the right upper and lower extremity F waves are present in both the upper and lower extremity.      Laboratory data review                     8/2023 9/1/2023    NA/K           143/3.7  BUN/CR      17.9/0.99  GLU             108  AST              26  WBC/HGB    7.7/13.7  PLTs             381,000  hCG            negative    B12             801  NARAYAN            negative  B1                                                 146  CRP/ESR                                     <3.00/11    Exam  See above for recent events  Review of systems pertinent positive negatives  No diplopia no dysarthria no true dysphagia      History of migraine headaches see above  No true vision loss  No true  vertigo  Some residual tingling and numbness comes and goes less bad compared to previous    No chest pain no shortness of breath no nausea vomiting  No bowel or bladder difficulty    No weakness    Otherwise review of systems negative    General exam  Blood pressure 103/70, pulse 67  Alert orient x3   helps with translation  HEENT normal/neck supple  Lungs clear  Heart rate regular  Abdomen soft positive bowel sounds  Symmetrical pulses  No edema the feet    Hyperextensible joints    Neurologic exam  Alert orient x3  Normal prosody speech  Normal naming  Normal comprehension  Normal repetition  No aphasia  No neglect  Memory recall okay    Cranial nerves II through XII  No ophthalmoplegia  No nystagmus  Optic fundi good no papilledema  Visual fields intact  Face symmetrical  Speech and tongue twisters okay    Upper extremities reported right over left  Deltoid 5/5  Triceps 5/5  Biceps 5/5  Wrist/finger flexors 5/5  Wrist/finger extensors 5/5  Intrinsic strength 5/5    No drift  No tremor  Rapid alternating movements good    Lower extremities  Iliopsoas 5/5  Quadriceps 5/5  Hamstring 5/5  Anterior tibial 5/5  Gastrocnemius 5/5  EHL 5/5    No spasticity    Reflexes reported right over left  Biceps 2/2  Triceps 2/2  Knee 2/2  Ankle 2/2  Toes downgoing  Negative Escoto reflex    Gait  Normal gait  Can tandem gait with eyes open  Romberg negative        Assessment/plan    1.  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 October 22, 2023         Acute onset of paresthesias in the feet/legs and hands bilaterally it came on fairly quickly       Previous chronic numbness of the right face       Some history of 3-day headache a week before       Some jiggly vision in the past question oscillopsia       Retained reflexes         August 22, 2023 upper respiratory infection       August 27, 2023 paresthesias       September 1, 2023 LP with normal CSF (probably done too early in  "disease process) patient had been in the ER frequently and they tapped her due to \"increasing symptoms\"       October 5, 2023 EMG with absent F waves at the [facility] (8 weeks into disease process)       November 22, 2020 3 repeat EMG normal study with normal F waves in the right upper and right lower extremity       Retained reflexes/normal strength/normal gait/normal neuro exam      2.  Does have some history that she has to shake her hands to get the sensation back into them       Has hyperextensible joints       Reflexes retained normal strength and gait right now                  3.  History of migraines chronic       Mother and maternal grandmother with migraines    4.   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  Demonstrates no stenosis of the major intracranial         arteries. 2.3 mm posteriorly oriented saccular outpouching arising         from posterior communicating artery origin, which can be a small          aneurysm vs dilated infundibulum.        Diagnosis  GBS onset August 27, 2023 normal EMG November 22, 2023  Migraine headaches    MRI head nonspecific T2 flair changes consistent with migraine headache history  MRI cervical/thoracic/lumbar spine no compression no cord lesions      Nortriptyline 10 mg 1 p.o. nightly patient will use more as needed of her choice for migraine headaches  Did not want to be on Neurontin for the paresthesias from her GBS which is improved now on EMG  Reviewed her neurologic presentation from late August through late November  Reviewed work-up labs CSF and multiple EMG testing    We did discuss that the paresthesias could be residual may improve with time    Patient will follow-up June 2024 for migraine headaches    Total care time today 41 minutes (2 separate neurologic conditions discussed and reviewed) medication filled      Again, thank you for allowing me to participate in the care of your " patient.        Sincerely,        emelina Okeefe MD

## 2023-12-20 ENCOUNTER — OFFICE VISIT (OUTPATIENT)
Dept: FAMILY MEDICINE | Facility: CLINIC | Age: 28
End: 2023-12-20
Payer: COMMERCIAL

## 2023-12-20 VITALS
DIASTOLIC BLOOD PRESSURE: 72 MMHG | HEIGHT: 61 IN | OXYGEN SATURATION: 98 % | SYSTOLIC BLOOD PRESSURE: 113 MMHG | HEART RATE: 79 BPM | RESPIRATION RATE: 14 BRPM | BODY MASS INDEX: 21.45 KG/M2 | TEMPERATURE: 98.9 F | WEIGHT: 113.6 LBS

## 2023-12-20 DIAGNOSIS — K64.4 EXTERNAL HEMORRHOIDS: ICD-10-CM

## 2023-12-20 DIAGNOSIS — J35.8 TONSIL STONE: ICD-10-CM

## 2023-12-20 DIAGNOSIS — Z12.4 CERVICAL CANCER SCREENING: ICD-10-CM

## 2023-12-20 DIAGNOSIS — Z00.00 ROUTINE GENERAL MEDICAL EXAMINATION AT A HEALTH CARE FACILITY: Primary | ICD-10-CM

## 2023-12-20 DIAGNOSIS — G61.0 GUILLAIN-BARRE SYNDROME (H): ICD-10-CM

## 2023-12-20 PROCEDURE — 87624 HPV HI-RISK TYP POOLED RSLT: CPT | Performed by: FAMILY MEDICINE

## 2023-12-20 PROCEDURE — 99395 PREV VISIT EST AGE 18-39: CPT | Performed by: FAMILY MEDICINE

## 2023-12-20 PROCEDURE — G0145 SCR C/V CYTO,THINLAYER,RESCR: HCPCS | Performed by: FAMILY MEDICINE

## 2023-12-20 ASSESSMENT — ENCOUNTER SYMPTOMS
BREAST MASS: 0
HEADACHES: 1

## 2023-12-20 NOTE — PATIENT INSTRUCTIONS
Preventive Health Recommendations  Female Ages 26 - 39  Yearly exam:   See your health care provider every year in order to  Review health changes.   Discuss preventive care.    Review your medicines if you your doctor has prescribed any.    Until age 30: Get a Pap test every three years (more often if you have had an abnormal result).    After age 30: Talk to your doctor about whether you should have a Pap test every 3 years or have a Pap test with HPV screening every 5 years.   You do not need a Pap test if your uterus was removed (hysterectomy) and you have not had cancer.  You should be tested each year for STDs (sexually transmitted diseases), if you're at risk.   Talk to your provider about how often to have your cholesterol checked.  If you are at risk for diabetes, you should have a diabetes test (fasting glucose).  Shots: Get a flu shot each year. Get a tetanus shot every 10 years.   Nutrition:   Eat at least 5 servings of fruits and vegetables each day.  Eat whole-grain bread, whole-wheat pasta and brown rice instead of white grains and rice.  Get adequate Calcium and Vitamin D.     Lifestyle  Exercise at least 150 minutes a week (30 minutes a day, 5 days of the week). This will help you control your weight and prevent disease.  Limit alcohol to one drink per day.  No smoking.   Wear sunscreen to prevent skin cancer.  See your dentist every six months for an exam and cleaning.    For hemorrhoids:    - Hot baths  - Keep stools soft so you do not have to strain  - Over-the-counter hydrocortisone cream with aloe 2-3 times daily

## 2023-12-20 NOTE — PROGRESS NOTES
Assessment & Plan     Routine general medical examination at a health care facility  Discussed routine health maintenance based upon her age and risks.    Cervical cancer screening  Has a history of LGSIL Pap and we will recheck and follow-up accordingly  - Pap Screen reflex to HPV if ASCUS - recommend age 25 - 29    Tonsil stone  Questions regarding what sounds like a couple of tonsil stones somewhat recently.  She asked about having tonsils removed but I think that is very premature and we will start with some basic hygiene, etc. and I provided some information on this.    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  Seems to be getting better and followed by neurology.  This may play a role to future vaccinations.    External hemorrhoids  Has some mild external hemorrhoids and conservative management discussed.       See Patient Instructions    Keira Anderson MD  [facility] DANIELLA Freeman is a 28 year old, presenting for the following health issues:  Establish Care        12/20/2023     2:55 PM   Additional Questions   Roomed by Trey   Accompanied by Self         12/20/2023     2:55 PM   Patient Reported Additional Medications   Patient reports taking the following new medications Gabapentin       Healthy Habits:     Getting at least 3 servings of Calcium per day:  Yes    Bi-annual eye exam:  Yes    Dental care twice a year:  Yes    Sleep apnea or symptoms of sleep apnea:  None    Diet:  Regular (no restrictions)    Frequency of exercise:  4-5 days/week    Duration of exercise:  30-45 minutes    Taking medications regularly:  Yes    Medication side effects:  None    Additional concerns today:  Yes           Visit with patient today for routine health maintenance and a few issues as  "above.      Review of Systems   Breasts:  Negative for tenderness, breast mass and discharge.   Genitourinary:  Negative for pelvic pain, vaginal bleeding and vaginal discharge.   Neurological:  Positive for headaches.      CONSTITUTIONAL: NEGATIVE for fever, chills, change in weight  INTEGUMENTARY/SKIN: NEGATIVE for worrisome rashes, moles or lesions  EYES: NEGATIVE for vision changes or irritation  ENT/MOUTH: Questions about possible tonsil stones  RESP: NEGATIVE for significant cough or SOB  BREAST: NEGATIVE for masses, tenderness or discharge  CV: NEGATIVE for chest pain, palpitations or peripheral edema  GI: Possible hemorrhoids  : NEGATIVE for frequency, dysuria, or hematuria  MUSCULOSKELETAL: NEGATIVE for significant arthralgias or myalgia  NEURO: NEGATIVE for weakness, dizziness or paresthesias  ENDOCRINE: NEGATIVE for temperature intolerance, skin/hair changes  HEME: NEGATIVE for bleeding problems  PSYCHIATRIC: NEGATIVE for changes in mood or affect      Objective    /72 (BP Location: Right arm, Patient Position: Sitting, Cuff Size: Adult Regular)   Pulse 79   Temp 98.9  F (37.2  C) (Tympanic)   Resp 14   Ht 1.549 m (5' 1\")   Wt 51.5 kg (113 lb 9.6 oz)   LMP 12/16/2023   SpO2 98%   BMI 21.46 kg/m    Body mass index is 21.46 kg/m .  Physical Exam   GENERAL: healthy, alert and no distress  EYES: Eyes grossly normal to inspection, PERRL and conjunctivae and sclerae normal  HENT: ear canals and TM's normal, nose and mouth without ulcers or lesions  NECK: no adenopathy, no asymmetry, masses, or scars and thyroid normal to palpation  RESP: lungs clear to auscultation - no rales, rhonchi or wheezes  CV: regular rate and rhythm, normal S1 S2, no S3 or S4, no murmur, click or rub, no peripheral edema and peripheral pulses strong  ABDOMEN: soft, nontender, no hepatosplenomegaly, no masses and bowel sounds normal   (female): normal female external genitalia, normal urethral meatus, vaginal mucosa " pink, moist, well rugated, and normal cervix/adnexa/uterus without masses or discharge  MS: no gross musculoskeletal defects noted, no edema  SKIN: no suspicious lesions or rashes  NEURO: Normal strength and tone, mentation intact and speech normal  PSYCH: mentation appears normal, affect normal/bright    Past labs reviewed with the patient.

## 2023-12-21 ENCOUNTER — PATIENT OUTREACH (OUTPATIENT)
Dept: GASTROENTEROLOGY | Facility: CLINIC | Age: 28
End: 2023-12-21

## 2023-12-26 LAB
BKR LAB AP GYN ADEQUACY: NORMAL
BKR LAB AP GYN INTERPRETATION: NORMAL
BKR LAB AP HPV REFLEX: NORMAL
BKR LAB AP LMP: NORMAL
BKR LAB AP PREVIOUS ABNL DX: NORMAL
BKR LAB AP PREVIOUS ABNORMAL: NORMAL
PATH REPORT.COMMENTS IMP SPEC: NORMAL
PATH REPORT.COMMENTS IMP SPEC: NORMAL
PATH REPORT.RELEVANT HX SPEC: NORMAL

## 2023-12-28 LAB
HUMAN PAPILLOMA VIRUS 16 DNA: NEGATIVE
HUMAN PAPILLOMA VIRUS 18 DNA: NEGATIVE
HUMAN PAPILLOMA VIRUS FINAL DIAGNOSIS: NORMAL
HUMAN PAPILLOMA VIRUS OTHER HR: NEGATIVE

## 2024-01-09 ENCOUNTER — MYC MEDICAL ADVICE (OUTPATIENT)
Dept: FAMILY MEDICINE | Facility: CLINIC | Age: 29
End: 2024-01-09
Payer: COMMERCIAL

## 2024-01-09 ENCOUNTER — E-VISIT (OUTPATIENT)
Dept: URGENT CARE | Facility: CLINIC | Age: 29
End: 2024-01-09
Payer: COMMERCIAL

## 2024-01-09 DIAGNOSIS — J02.9 SORE THROAT: Primary | ICD-10-CM

## 2024-01-09 PROCEDURE — 99421 OL DIG E/M SVC 5-10 MIN: CPT | Performed by: PHYSICIAN ASSISTANT

## 2024-01-09 NOTE — PATIENT INSTRUCTIONS
Dear Lydia,    After reviewing your responses, I would like you to come in for a swab to make sure we treat you correctly. This swab is to evaluate you for possible COVID and Strep Throat, and should be scheduled for today or tomorrow. Please use the Schedule Now button in [facility] to schedule your swab. Otherwise, click this link to schedule a lab only appointment.    Lab appointments are not available at most locations on the weekends. If no Lab Only appointment is available, you should be seen in any of our convenient Urgent Care Centers for an in person visit, which can be found on our website here.    You will receive instructions with your results in [facility] once they are available.     If your symptoms worsen, you develop difficulty breathing, difficulty with drinking enough to stay hydrated, difficulty swallowing your saliva or have fevers for more than 5 days, please contact your primary care provider for an appointment or visit an Urgent Care Center to be seen.      Thanks again for choosing us as your health care partner.   Joshua Morrow PA-C

## 2024-01-10 ENCOUNTER — LAB (OUTPATIENT)
Dept: LAB | Facility: CLINIC | Age: 29
End: 2024-01-10
Payer: COMMERCIAL

## 2024-01-10 DIAGNOSIS — J02.9 SORE THROAT: ICD-10-CM

## 2024-01-10 LAB
DEPRECATED S PYO AG THROAT QL EIA: NEGATIVE
GROUP A STREP BY PCR: NOT DETECTED

## 2024-01-10 PROCEDURE — 87651 STREP A DNA AMP PROBE: CPT

## 2024-01-10 PROCEDURE — 87635 SARS-COV-2 COVID-19 AMP PRB: CPT

## 2024-01-11 LAB — SARS-COV-2 RNA RESP QL NAA+PROBE: NEGATIVE

## 2024-01-14 ENCOUNTER — MYC REFILL (OUTPATIENT)
Dept: NEUROLOGY | Facility: CLINIC | Age: 29
End: 2024-01-14
Payer: COMMERCIAL

## 2024-01-14 DIAGNOSIS — R20.2 PARESTHESIAS: ICD-10-CM

## 2024-01-14 DIAGNOSIS — G43.111 INTRACTABLE MIGRAINE WITH AURA WITH STATUS MIGRAINOSUS: ICD-10-CM

## 2024-01-14 DIAGNOSIS — R51.9 SEVERE HEADACHE: ICD-10-CM

## 2024-01-15 RX ORDER — NORTRIPTYLINE HCL 10 MG
10 CAPSULE ORAL AT BEDTIME
Qty: 90 CAPSULE | Refills: 1 | Status: SHIPPED | OUTPATIENT
Start: 2024-01-15 | End: 2024-04-16

## 2024-01-15 NOTE — TELEPHONE ENCOUNTER
Refill request for: nortriptyline 10mg   Directions: Take 1 capsule (10 mg) by mouth at bedtime     Change of pharmacy request.     LOV: 11/22/23  NOV: 06/17/24    90 day supply with 1 refills Medication T'd for review and signature    Luisa Pedraza LPN on 1/15/2024 at 11:09 AM

## 2024-02-05 ENCOUNTER — LAB (OUTPATIENT)
Dept: LAB | Facility: CLINIC | Age: 29
End: 2024-02-05
Attending: FAMILY MEDICINE
Payer: COMMERCIAL

## 2024-02-05 DIAGNOSIS — J02.9 SORE THROAT: ICD-10-CM

## 2024-02-05 LAB
DEPRECATED S PYO AG THROAT QL EIA: NEGATIVE
GROUP A STREP BY PCR: NOT DETECTED

## 2024-02-05 PROCEDURE — 87651 STREP A DNA AMP PROBE: CPT

## 2024-03-30 NOTE — BRIEF OP NOTE
[facility]    Brief Operative Note    Pre-operative diagnosis: Fetal intolerance of labor  Post-operative diagnosis fetal intolerance of labor    Procedure: Procedure(s):   SECTION  Surgeon: Surgeon(s) and Role:     * Janet De La Garza MD - Primary  Anesthesia: Epidural   Estimated blood loss: 716cc  Drains: None  Specimens: * No specimens in log *  Findings:   female infant, 7-11, clear fluid.  Complications: None.        
2 = A lot of assistance

## 2024-04-16 ENCOUNTER — OFFICE VISIT (OUTPATIENT)
Dept: FAMILY MEDICINE | Facility: CLINIC | Age: 29
End: 2024-04-16
Payer: COMMERCIAL

## 2024-04-16 VITALS
DIASTOLIC BLOOD PRESSURE: 75 MMHG | TEMPERATURE: 99.2 F | HEIGHT: 61 IN | HEART RATE: 79 BPM | OXYGEN SATURATION: 98 % | BODY MASS INDEX: 22.03 KG/M2 | RESPIRATION RATE: 13 BRPM | SYSTOLIC BLOOD PRESSURE: 112 MMHG | WEIGHT: 116.7 LBS

## 2024-04-16 DIAGNOSIS — R42 LIGHTHEADEDNESS: Primary | ICD-10-CM

## 2024-04-16 DIAGNOSIS — Z86.69 HISTORY OF MIGRAINE: ICD-10-CM

## 2024-04-16 DIAGNOSIS — R20.2 PARESTHESIAS: ICD-10-CM

## 2024-04-16 DIAGNOSIS — Z88.9 H/O MULTIPLE ALLERGIES: ICD-10-CM

## 2024-04-16 DIAGNOSIS — G61.0 GUILLAIN-BARRE SYNDROME (H): ICD-10-CM

## 2024-04-16 DIAGNOSIS — L73.9 FOLLICULITIS: ICD-10-CM

## 2024-04-16 DIAGNOSIS — L50.9 URTICARIA: ICD-10-CM

## 2024-04-16 LAB
ERYTHROCYTE [DISTWIDTH] IN BLOOD BY AUTOMATED COUNT: 12.1 % (ref 10–15)
HBA1C MFR BLD: 5.2 % (ref 0–5.6)
HCT VFR BLD AUTO: 39.6 % (ref 35–47)
HGB BLD-MCNC: 13.5 G/DL (ref 11.7–15.7)
MCH RBC QN AUTO: 32.4 PG (ref 26.5–33)
MCHC RBC AUTO-ENTMCNC: 34.1 G/DL (ref 31.5–36.5)
MCV RBC AUTO: 95 FL (ref 78–100)
PLATELET # BLD AUTO: 304 10E3/UL (ref 150–450)
RBC # BLD AUTO: 4.17 10E6/UL (ref 3.8–5.2)
WBC # BLD AUTO: 6.7 10E3/UL (ref 4–11)

## 2024-04-16 PROCEDURE — 85027 COMPLETE CBC AUTOMATED: CPT | Performed by: FAMILY MEDICINE

## 2024-04-16 PROCEDURE — 80048 BASIC METABOLIC PNL TOTAL CA: CPT | Performed by: FAMILY MEDICINE

## 2024-04-16 PROCEDURE — 83036 HEMOGLOBIN GLYCOSYLATED A1C: CPT | Performed by: FAMILY MEDICINE

## 2024-04-16 PROCEDURE — 36415 COLL VENOUS BLD VENIPUNCTURE: CPT | Performed by: FAMILY MEDICINE

## 2024-04-16 PROCEDURE — 99214 OFFICE O/P EST MOD 30 MIN: CPT | Performed by: FAMILY MEDICINE

## 2024-04-16 RX ORDER — EPINEPHRINE 0.3 MG/.3ML
0.3 INJECTION SUBCUTANEOUS PRN
Qty: 2 EACH | Refills: 1 | Status: SHIPPED | OUTPATIENT
Start: 2024-04-16

## 2024-04-16 RX ORDER — DOXYCYCLINE HYCLATE 100 MG
100 TABLET ORAL 2 TIMES DAILY
Qty: 14 TABLET | Refills: 0 | Status: SHIPPED | OUTPATIENT
Start: 2024-04-16 | End: 2024-04-23

## 2024-04-16 ASSESSMENT — ENCOUNTER SYMPTOMS: NEUROLOGIC COMPLAINT: 1

## 2024-04-16 ASSESSMENT — PAIN SCALES - GENERAL: PAINLEVEL: NO PAIN (0)

## 2024-04-16 NOTE — PROGRESS NOTES
Assessment & Plan     Lightheadedness  Has noted some spells of lightheadedness and rapid heartbeat and perhaps even a little bit of shortness of breath.  Seem to come on about once a month and cannot think of any particular trigger.  Feels almost as though she might faint.  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  So we discussed the situation I provided some information.  Nothing sticks out to make me think this is necessarily a permanent issue, but I discussed that those questions might be better directed to her neurology team.  We discussed that we are finding dysautonomia/POTS as part of the post COVID syndrome -meaning we can see these types of symptoms be postinfectious in certain folks.  We are taking a look at some lab work because she was concerned about the possibility of diabetes, etc. so we will see if anything pops up there as well.  Otherwise this is likely a case of conservative management.  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  But she decided to stop it because she felt that she was getting morning rebound headaches if she missed any dosages.  Since stopping at the morning headaches are still there but less frequent and she feels that things are manageable.    Folliculitis  Has had a few months of painful and sometimes itchy acne on her cheeks.  Has a dermatologist but cannot get in until this fall.  Things worsened around the time of spring break a couple of weeks ago including an outbreak of some pustules on her left temple.  Seem to dry up in the swimming pool she was in.  But she has some acne  cysts scattered throughout her cheeks and so I think a course of doxycycline might help settle this down.  - doxycycline hyclate (VIBRA-TABS) 100 MG tablet; Take 1 tablet (100 mg) by mouth 2 times daily for 7 days    Urticaria    - EPINEPHrine (EPIPEN 2-RICO) 0.3 MG/0.3ML injection 2-pack; Inject 0.3 mLs (0.3 mg) into the muscle as needed for anaphylaxis May repeat one time in 5-15 minutes if response to initial dose is inadequate.    H/O multiple allergies    - EPINEPHrine (EPIPEN 2-RICO) 0.3 MG/0.3ML injection 2-pack; Inject 0.3 mLs (0.3 mg) into the muscle as needed for anaphylaxis May repeat one time in 5-15 minutes if response to initial dose is inadequate.        See Patient Instructions    Subjective   Lydia is a 28 year old, presenting for the following health issues:  Derm Problem (Acne on face. Is painful to touch and sometimes has pus that develops), Diabetes (Would like her A1c checked. Her parents both have diabetes and she has had some symptoms in past few months such as excessive thirst), and Neurologic Problem (Discuss current Guillain-[place] syndrome symptoms)        4/16/2024     8:47 AM   Additional Questions   Roomed by Gladys LEACH   Accompanied by Daughter         4/16/2024     8:47 AM   Patient Reported Additional Medications   Patient reports taking the following new medications None     History of Present Illness       Reason for visit:  Skin condition , blood sugar, epipen,talking about current symptoms GBS.    She eats 2-3 servings of fruits and vegetables daily.She consumes 0 sweetened beverage(s) daily.She exercises with enough effort to increase her heart rate 20 to 29 minutes per day.  She exercises with enough effort to increase her heart rate 3 or less days per week.   She is taking medications regularly.         Concern - Acne on skin, diabetes concerns, current GBS symptoms, weight gain  Onset: Past 3-4 months   Description: Acne on skin that is painful to touch and often has pus,  "excessive thirst (family hx of diabetes), weight gain, discuss GBS symptoms  Intensity: moderate  Progression of Symptoms:  same  Accompanying Signs & Symptoms: N/A   Previous history of similar problem: No  Precipitating factors:        Worsened by: Diet can aggravate it if she has too much sugar or flour  Alleviating factors:        Improved by: N/A  Therapies tried and outcome: None    Here today for issues as above.      Review of Systems  Constitutional, HEENT, cardiovascular, pulmonary, gi and gu systems are negative, except as otherwise noted.      Objective    /75 (BP Location: Right arm, Patient Position: Sitting, Cuff Size: Adult Small)   Pulse 79   Temp 99.2  F (37.3  C) (Temporal)   Resp 13   Ht 1.549 m (5' 1\")   Wt 52.9 kg (116 lb 11.2 oz)   SpO2 98%   BMI 22.05 kg/m    Body mass index is 22.05 kg/m .  Physical Exam   Alert, pleasant, upbeat, and in no apparent discomfort.  S1 and S2 normal, no murmurs, clicks, gallops or rubs. Regular rate and rhythm. Chest is clear; no wheezes or rales. No edema or JVD.  Acne outbreaks on cheeks including some acne cysts  Past labs reviewed with the patient.             Signed Electronically by: Keira Anderson MD    "

## 2024-04-17 LAB
ANION GAP SERPL CALCULATED.3IONS-SCNC: 11 MMOL/L (ref 7–15)
BUN SERPL-MCNC: 12.9 MG/DL (ref 6–20)
CALCIUM SERPL-MCNC: 9.6 MG/DL (ref 8.6–10)
CHLORIDE SERPL-SCNC: 106 MMOL/L (ref 98–107)
CREAT SERPL-MCNC: 0.57 MG/DL (ref 0.51–0.95)
DEPRECATED HCO3 PLAS-SCNC: 24 MMOL/L (ref 22–29)
EGFRCR SERPLBLD CKD-EPI 2021: >90 ML/MIN/1.73M2
GLUCOSE SERPL-MCNC: 97 MG/DL (ref 70–99)
POTASSIUM SERPL-SCNC: 4.1 MMOL/L (ref 3.4–5.3)
SODIUM SERPL-SCNC: 141 MMOL/L (ref 135–145)

## 2024-04-17 NOTE — RESULT ENCOUNTER NOTE
Lydia,  Your lab work looks 100% perfectly normal.  So nothing that I can see is an obvious cause of those symptoms.  So now it gets me thinking this might be related to the GBS.  So hopefully it is something that just kind of fades away over time.  KEVIN Anderson M.D.

## 2024-06-03 DIAGNOSIS — M25.579 PAIN IN JOINT, ANKLE AND FOOT: Primary | ICD-10-CM

## 2024-06-14 NOTE — PROGRESS NOTES
"In person evaluation        HPI  8/29/2023, in person consultation  9/6/2023, in person visit (emergency add-on)  11/22/2023, in person visit  6/17/2024, in person visit      28-year-old been evaluated neurologically for:  Sensory changes in her legs and hands acute onset (question mild GBS)  Migraines with severe 3-day headache prior to these events  Post LP spinal headache 9/1/2023    Since last seen November 2023    Hands feel tired when doing tasks like vacuuming or chopping vegetables  Buzzing type symptoms but migraines have improved.  Migraines  Frequency 2/month on average but manageable  Duration 24 hours  Does not want to add any medications or take meds regularly    Has noticed that her vision will \"shake\"  She will lay down and it looks like her vision is \"jiggling\"  The description would be somewhat like oscillopsia but I do not see any movements of the eye with head movements or body movements or on ophthalmic exam  Can sometimes have a hard time focusing on objects that are close up    Will have her see the ophthalmologist      Past presentation review  Review of patient's difficulty with paresthesias and sensory changes:  Patient with URI August 22, 2023 August 27, 2023 numbness hands and feet concern for acute neuropathy  Went back to the ER had lumbar puncture 9/1/2023 fairly close to onset of symptoms protein is normal  Went to the [facility] clinic 10/5/2023 he had EMG that was normal except for \"absent F waves\"  Repeat EMG 11/22/2023 normal including F waves present.  (3 months after onset of symptoms)    Pattern of changes above could be Guillain-Barré mild form  With the resolution of the changes on EMG suspect that this was an acute polyradiculoneuropathy    Patient with residual tingling and buzzing sometimes worse at the end of the day  Sometimes worse with activity  It is improved compared to previous  LP was done very early after 3 to 4 days of symptoms  EMG that showed absent F waves was " done at 8 weeks  Normal EMG done at 3 months    Still has migraine headaches  Had stopped Neurontin and nortriptyline  After discussion decided to go back on nortriptyline 10 mg once at nighttime for migraines          Past history review  Patient added on emergently 9/6/2023  Has had difficulty with paresthesias now with headache that is probably more of a post LP headache  Brief summary of events since last seen  Fluctuating numbness progressive numbness from the knees up to the thighs worse when she sits for prolonged times  Had some chronic low back pain back when she had the birth of her child but that was distant and does not have back pain now  No specific weakness  No bowel or bladder difficulties although has a little bit of constipation  No fever or chills    Return to the ER on 9/1/2023 with complaints of some progressive numbness but still had reflexes  They performed a lumbar puncture  Had significant headache after the lumbar puncture  Lumbar puncture spinal fluid was benign no elevation of protein cell counts were okay    Return to the ER on 9/3/2023  Severe headache originally was frontal but then was global and posterior  Had nausea and vomiting with a headache worse when she was sitting up better when she laid down  Had signs and symptoms consistent with a spinal leak headache  Was going to get a blood patch but anesthesia thought it was too risky because her peripheral white count was running around 13 and they were concerned about infection  No stiff neck no meningismus no fever or chills no diarrhea    Had seen her primary with treatment of Zofran and Toradol injection earlier 9/6/2023 but not getting better  Called our office and had called our office over the last week or so almost every day with symptoms and signs    Due to significant symptoms I had her come to the office urgently so that I could do an actual exam on her.  Her neurologic exam is stable  She has no cranial nerve  abnormalities  Her mental status is normal  Her neck is supple  Reflexes are intact  She is ambulatory  Her proximal and distal strength is good    At this time I feel she has:  Paresthesias  Post spinal tap headache  Migraine headache/status migrainous  CTA showed small posterior communicating artery infundibula versus aneurysm 2.3 mm  CSF 9/1/2023 negative for hemorrhage or Guillain-Barré syndrome            Medical condition history onset: 8/29/2023 visit  Patient states that August 22, 2023 ago she had a 3-day bad headache with nausea but no vomiting  Had to get up and take care of the kids though no neurologic changes  Has had some chronic burning dysesthesias on the right side of her face  No history of visual loss  Had an episode of Jumbled vision unclear if this was oscillopsia but it was not associated with vertigo    Then August 27, 2023 watching a movie became numb in her feet up to her knee bilaterally came on over minutes  Took about an hour to get to the ER and then both hands seem to be numb  Numbness is in the hands and hand in the feet and legs    No bowel or bladder difficulty  He is able to ambulate  Except for some chronic numbness on the right face no new cranial nerve changes    Does have a history of migraines but no specific headache with the symptoms            A.  Patient with sudden onset bilateral leg numbness 8/27/2023       Onset of numbness occurred while seated on the couch was able to ambulate       No recent URI/GI symptoms or vaccinations       Did have a 3-day headache a week prior to this         Has 3 children one of them with a rash not sure if it was a viral syndrome         In the past has had vision jiggling x1 hour at night but no true vertigo or nausea vomiting       In the past has had some numbness on the right side of her face       Sometimes might have a little bit of trouble with swallowing her saliva chronic         Hyperextensible joints      B.  History of migraine  headaches       Frequency 1/month        Duration 2-5 hours        No loss of vision    C.  Severe headache lasting 3 days August 21, 2023        Had nausea no vomiting felt sick but has 3 kids had to get up and do things    D.  Post LP headache (LP 9/1/2023)      Past medical history  GERD  Migraines      Habits  Non-smoker  Does not drink alcohol  3 children ages 8, 5 and 2 (8/29/2023)    Family history  Father diabetes  Mother migraines  Paternal grandmother migraines      Work-up  Laboratory data 8/29/2023  MRI head 8/30/2023  1.  Two punctate nonenhancing foci of T2 prolongation within the subcortical white matter of the bilateral frontal lobes,         of doubtful clinical significance.  2.  Otherwise, unremarkable brain MRI.  CSF 9/1/2023, WBC 3, RBC 9, protein 21.7, glucose 52,  CT scan head 9/3/2023  1. No evidence of intracranial hemorrhage.  Head CTA 9/3/2023  1.  Demonstrates no stenosis of the major intracranial  arteries. 2.3 mm posteriorly oriented saccular outpouching arising  from posterior communicating artery origin, which can be a small  aneurysm vs dilated infundibulum.  Neck CTA demonstrates no stenosis of the major cervical arteries.  MRI cervical spine/thoracic/lumbar spine 9/1/2023.  (See official report)  Impression:   1.  Subtle disc bulges at C3-4, C4-5 and C5-6.   2.  No spinal canal or neural foraminal stenosis at any level.   3.  Normal cervical and thoracic spinal cord signal.   4.  No suspicious enhancement in the spinal neuraxis to explain symptoms.    EMG [facility] 10/5/2023, (8 weeks after symptom onset)  Right median and right peroneal F wave  absent otherwise normal study  EMG 11/22/2023 (3 months after onset of symptoms)  Normal EMG of the right upper and lower extremity F waves are present in both the upper and lower extremity.      Laboratory data review                     8/2023 9/1/2023    NA/K           143/3.7  BUN/CR      17.9/0.99  GLU              108  AST              26  WBC/HGB    7.7/13.7  PLTs             381,000  hCG            negative    B12             801  NARAYAN            negative  B1                                                 146  CRP/ESR                                     <3.00/11    Exam  See above for recent events  Review of systems pertinent positive negatives  No diplopia no dysarthria no true dysphagia      History of migraine headaches see above  No true vision loss  No true vertigo  Some residual tingling and numbness comes and goes less bad compared to previous    No chest pain no shortness of breath no nausea vomiting  No bowel or bladder difficulty    No weakness    Otherwise review of systems negative    General exam  Blood pressure 109/65, pulse 72  Alert orient x3   helps with translation  HEENT normal/neck supple  Lungs clear  Heart rate regular  Abdomen soft positive bowel sounds  Symmetrical pulses  No edema the feet    Hyperextensible joints    Neurologic exam  Alert orient x3  Normal prosody speech  Normal naming  Normal comprehension  Normal repetition  No aphasia  No neglect  Memory recall okay    Cranial nerves II through XII  No ophthalmoplegia  No nystagmus  Optic fundi good no papilledema  Visual fields intact  Face symmetrical  Speech and tongue twisters okay    Upper extremities reported right over left  Deltoid 5/5  Triceps 5/5  Biceps 5/5  Wrist/finger flexors 5/5  Wrist/finger extensors 5/5  Intrinsic strength 5/5    No drift  No tremor  Rapid alternating movements good    Lower extremities  Iliopsoas 5/5  Quadriceps 5/5  Hamstring 5/5  Anterior tibial 5/5  Gastrocnemius 5/5  EHL 5/5    No spasticity    Reflexes reported right over left  Biceps 2/2  Triceps 2/2  Knee 2/2  Ankle 2/2  Toes downgoing  Negative Escoto reflex    Gait  Normal gait  Can tandem gait with eyes open  Romberg negative        Assessment/plan    1.  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  "October 22, 2023         Acute onset of paresthesias in the feet/legs and hands bilaterally it came on fairly quickly       Previous chronic numbness of the right face       Some history of 3-day headache a week before       Some jiggly vision in the past question oscillopsia       Retained reflexes         August 22, 2023 upper respiratory infection       August 27, 2023 paresthesias       September 1, 2023 LP with normal CSF (probably done too early in disease process) patient had been in the ER frequently and they tapped her due to \"increasing symptoms\"       October 5, 2023 EMG with absent F waves at the [facility] (8 weeks into disease process)       November 22, 2020 3 repeat EMG normal study with normal F waves in the right upper and right lower extremity       Retained reflexes/normal strength/normal gait/normal neuro exam      2.  Does have some history that she has to shake her hands to get the sensation back into them       Has hyperextensible joints       Reflexes retained normal strength and gait right now                  3.  History of migraines chronic       Mother and maternal grandmother with migraines    4.   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  Demonstrates no stenosis of the major intracranial         arteries. 2.3 mm posteriorly oriented saccular outpouching arising         from posterior communicating artery origin, which can be a small          aneurysm vs dilated infundibulum.        Diagnosis  GBS onset August 27, 2023 normal EMG November 22, 2023  Migraine headaches    Past workup  MRI head nonspecific T2 flair changes consistent with migraine headache history  MRI cervical/thoracic/lumbar spine no compression no cord lesions      Hands feel tired when doing tasks like vacuuming or chopping vegetables  Suspect this is more of a tendinitis  She wants to work out to see if she can increase her muscle " "strength  Recommended that she go slow with weight and repetitions use machines not open weight  Use an exercise glove/wrist wrap    I think some of the above symptom is more from repetition    Migraines  Frequency 2/month on average but manageable  Duration 24 hours  Does not want to add any medications or take meds regularly      Has noticed that her vision will \"shake\"  She will lay down and it looks like her vision is \"jiggling\"  The description would be somewhat like oscillopsia but I do not see any movements of the eye with head movements or body movements or on ophthalmic exam  Can sometimes have a hard time focusing on objects that are close up    Will have her see the ophthalmologist    Follow-up spring/April 2025  Discussed with patient and   helps with some translation  Neuroexam is stable    30 minutes total care time today  The longitudinal plan of care for the diagnosis(es)/condition(s) as documented were addressed during this visit. Due to the added complexity in care, I will continue to support Lydia in the subsequent management and with ongoing continuity of care.      As part of visit today  Reviewed primary MD note 4/16/2024 (patient with some lightheadedness rapid heart rate short of breath  Patient in ED 6/3/2024 presumed plantar fasciitis recommended conservative treatment    "

## 2024-06-17 ENCOUNTER — OFFICE VISIT (OUTPATIENT)
Dept: NEUROLOGY | Facility: CLINIC | Age: 29
End: 2024-06-17
Payer: COMMERCIAL

## 2024-06-17 VITALS
SYSTOLIC BLOOD PRESSURE: 109 MMHG | HEIGHT: 61 IN | WEIGHT: 116 LBS | HEART RATE: 72 BPM | BODY MASS INDEX: 21.9 KG/M2 | DIASTOLIC BLOOD PRESSURE: 65 MMHG

## 2024-06-17 DIAGNOSIS — G61.0 GUILLAIN-BARRE SYNDROME (H): ICD-10-CM

## 2024-06-17 DIAGNOSIS — R20.2 PARESTHESIAS: ICD-10-CM

## 2024-06-17 DIAGNOSIS — H53.9 VISION CHANGES: Primary | ICD-10-CM

## 2024-06-17 DIAGNOSIS — G43.111 INTRACTABLE MIGRAINE WITH AURA WITH STATUS MIGRAINOSUS: ICD-10-CM

## 2024-06-17 PROCEDURE — 99214 OFFICE O/P EST MOD 30 MIN: CPT | Performed by: PSYCHIATRY & NEUROLOGY

## 2024-06-17 PROCEDURE — G2211 COMPLEX E/M VISIT ADD ON: HCPCS | Performed by: PSYCHIATRY & NEUROLOGY

## 2024-06-17 RX ORDER — CLINDAMYCIN PHOSPHATE 10 MG/G
GEL TOPICAL
COMMUNITY
Start: 2024-04-24

## 2024-06-17 RX ORDER — TRETINOIN 0.25 MG/G
CREAM TOPICAL
COMMUNITY
Start: 2024-04-24

## 2024-06-17 NOTE — LETTER
"6/17/2024      Pam Thompson  [address]  [facility] [address]      Dear Colleague,    Thank you for referring your patient, Pam Thompson, to the [facility] [place]. Please see a copy of my visit note below.    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  Migraines  Frequency 2/month on average but manageable  Duration 24 hours  Does not want to add any medications or take meds regularly    Has noticed that her vision will \"shake\"  She will lay down and it looks like her vision is \"jiggling\"  The description would be somewhat like oscillopsia but I do not see any movements of the eye with head movements or body movements or on ophthalmic exam  Can sometimes have a hard time focusing on objects that are close up    Will have her see the ophthalmologist      Past presentation review  Review of patient's difficulty with paresthesias and sensory changes:  Patient with URI August 22, 2023 August 27, 2023 numbness hands and feet concern for acute neuropathy  Went back to the ER had lumbar puncture 9/1/2023 fairly close to onset of symptoms protein is normal  Went to the [facility] clinic 10/5/2023 he had EMG that was normal except for \"absent F waves\"  Repeat EMG 11/22/2023 normal including F waves present.  (3 months after onset of symptoms)    Pattern of changes above could be Guillain-Barré mild form  With the resolution of the changes on EMG suspect that this was an acute " polyradiculoneuropathy    Patient with residual tingling and buzzing sometimes worse at the end of the day  Sometimes worse with activity  It is improved compared to previous  LP was done very early after 3 to 4 days of symptoms  EMG that showed absent F waves was done at 8 weeks  Normal EMG done at 3 months    Still has migraine headaches  Had stopped Neurontin and nortriptyline  After discussion decided to go back on nortriptyline 10 mg once at nighttime for migraines          Past history review  Patient added on emergently 9/6/2023  Has had difficulty with paresthesias now with headache that is probably more of a post LP headache  Brief summary of events since last seen  Fluctuating numbness progressive numbness from the knees up to the thighs worse when she sits for prolonged times  Had some chronic low back pain back when she had the birth of her child but that was distant and does not have back pain now  No specific weakness  No bowel or bladder difficulties although has a little bit of constipation  No fever or chills    Return to the ER on 9/1/2023 with complaints of some progressive numbness but still had reflexes  They performed a lumbar puncture  Had significant headache after the lumbar puncture  Lumbar puncture spinal fluid was benign no elevation of protein cell counts were okay    Return to the ER on 9/3/2023  Severe headache originally was frontal but then was global and posterior  Had nausea and vomiting with a headache worse when she was sitting up better when she laid down  Had signs and symptoms consistent with a spinal leak headache  Was going to get a blood patch but anesthesia thought it was too risky because her peripheral white count was running around 13 and they were concerned about infection  No stiff neck no meningismus no fever or chills no diarrhea    Had seen her primary with treatment of Zofran and Toradol injection earlier 9/6/2023 but not getting better  Called our office and had  called our office over the last week or so almost every day with symptoms and signs    Due to significant symptoms I had her come to the office urgently so that I could do an actual exam on her.  Her neurologic exam is stable  She has no cranial nerve abnormalities  Her mental status is normal  Her neck is supple  Reflexes are intact  She is ambulatory  Her proximal and distal strength is good    At this time I feel she has:  Paresthesias  Post spinal tap headache  Migraine headache/status migrainous  CTA showed small posterior communicating artery infundibula versus aneurysm 2.3 mm  CSF 9/1/2023 negative for hemorrhage or Guillain-Barré syndrome            Medical condition history onset: 8/29/2023 visit  Patient states that August 22, 2023 ago she had a 3-day bad headache with nausea but no vomiting  Had to get up and take care of the kids though no neurologic changes  Has had some chronic burning dysesthesias on the right side of her face  No history of visual loss  Had an episode of Jumbled vision unclear if this was oscillopsia but it was not associated with vertigo    Then August 27, 2023 watching a movie became numb in her feet up to her knee bilaterally came on over minutes  Took about an hour to get to the ER and then both hands seem to be numb  Numbness is in the hands and hand in the feet and legs    No bowel or bladder difficulty  He is able to ambulate  Except for some chronic numbness on the right face no new cranial nerve changes    Does have a history of migraines but no specific headache with the symptoms            A.  Patient with sudden onset bilateral leg numbness 8/27/2023       Onset of numbness occurred while seated on the couch was able to ambulate       No recent URI/GI symptoms or vaccinations       Did have a 3-day headache a week prior to this         Has 3 children one of them with a rash not sure if it was a viral syndrome         In the past has had vision jiggling x1 hour at night  but no true vertigo or nausea vomiting       In the past has had some numbness on the right side of her face       Sometimes might have a little bit of trouble with swallowing her saliva chronic         Hyperextensible joints      B.  History of migraine headaches       Frequency 1/month        Duration 2-5 hours        No loss of vision    C.  Severe headache lasting 3 days August 21, 2023        Had nausea no vomiting felt sick but has 3 kids had to get up and do things    D.  Post LP headache (LP 9/1/2023)      Past medical history  GERD  Migraines      Habits  Non-smoker  Does not drink alcohol  3 children ages 8, 5 and 2 (8/29/2023)    Family history  Father diabetes  Mother migraines  Paternal grandmother migraines      Work-up  Laboratory data 8/29/2023  MRI head 8/30/2023  1.  Two punctate nonenhancing foci of T2 prolongation within the subcortical white matter of the bilateral frontal lobes,         of doubtful clinical significance.  2.  Otherwise, unremarkable brain MRI.  CSF 9/1/2023, WBC 3, RBC 9, protein 21.7, glucose 52,  CT scan head 9/3/2023  1. No evidence of intracranial hemorrhage.  Head CTA 9/3/2023  1.  Demonstrates no stenosis of the major intracranial  arteries. 2.3 mm posteriorly oriented saccular outpouching arising  from posterior communicating artery origin, which can be a small  aneurysm vs dilated infundibulum.  Neck CTA demonstrates no stenosis of the major cervical arteries.  MRI cervical spine/thoracic/lumbar spine 9/1/2023.  (See official report)  Impression:   1.  Subtle disc bulges at C3-4, C4-5 and C5-6.   2.  No spinal canal or neural foraminal stenosis at any level.   3.  Normal cervical and thoracic spinal cord signal.   4.  No suspicious enhancement in the spinal neuraxis to explain symptoms.    EMG [facility] 10/5/2023, (8 weeks after symptom onset)  Right median and right peroneal F wave  absent otherwise normal study  EMG 11/22/2023 (3 months after onset of  symptoms)  Normal EMG of the right upper and lower extremity F waves are present in both the upper and lower extremity.      Laboratory data review                     8/2023 9/1/2023    NA/K           143/3.7  BUN/CR      17.9/0.99  GLU             108  AST              26  WBC/HGB    7.7/13.7  PLTs             381,000  hCG            negative    B12             801  NARAYAN            negative  B1                                                 146  CRP/ESR                                     <3.00/11    Exam  See above for recent events  Review of systems pertinent positive negatives  No diplopia no dysarthria no true dysphagia      History of migraine headaches see above  No true vision loss  No true vertigo  Some residual tingling and numbness comes and goes less bad compared to previous    No chest pain no shortness of breath no nausea vomiting  No bowel or bladder difficulty    No weakness    Otherwise review of systems negative    General exam  Blood pressure 109/65, pulse 72  Alert orient x3   helps with translation  HEENT normal/neck supple  Lungs clear  Heart rate regular  Abdomen soft positive bowel sounds  Symmetrical pulses  No edema the feet    Hyperextensible joints    Neurologic exam  Alert orient x3  Normal prosody speech  Normal naming  Normal comprehension  Normal repetition  No aphasia  No neglect  Memory recall okay    Cranial nerves II through XII  No ophthalmoplegia  No nystagmus  Optic fundi good no papilledema  Visual fields intact  Face symmetrical  Speech and tongue twisters okay    Upper extremities reported right over left  Deltoid 5/5  Triceps 5/5  Biceps 5/5  Wrist/finger flexors 5/5  Wrist/finger extensors 5/5  Intrinsic strength 5/5    No drift  No tremor  Rapid alternating movements good    Lower extremities  Iliopsoas 5/5  Quadriceps 5/5  Hamstring 5/5  Anterior tibial 5/5  Gastrocnemius 5/5  EHL 5/5    No spasticity    Reflexes reported right over  "left  Biceps 2/2  Triceps 2/2  Knee 2/2  Ankle 2/2  Toes downgoing  Negative Escoto reflex    Gait  Normal gait  Can tandem gait with eyes open  Romberg negative        Assessment/plan    1.  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 October 22, 2023         Acute onset of paresthesias in the feet/legs and hands bilaterally it came on fairly quickly       Previous chronic numbness of the right face       Some history of 3-day headache a week before       Some jiggly vision in the past question oscillopsia       Retained reflexes         August 22, 2023 upper respiratory infection       August 27, 2023 paresthesias       September 1, 2023 LP with normal CSF (probably done too early in disease process) patient had been in the ER frequently and they tapped her due to \"increasing symptoms\"       October 5, 2023 EMG with absent F waves at the [facility] (8 weeks into disease process)       November 22, 2020 3 repeat EMG normal study with normal F waves in the right upper and right lower extremity       Retained reflexes/normal strength/normal gait/normal neuro exam      2.  Does have some history that she has to shake her hands to get the sensation back into them       Has hyperextensible joints       Reflexes retained normal strength and gait right now                  3.  History of migraines chronic       Mother and maternal grandmother with migraines    4.   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  Demonstrates no stenosis of the major intracranial         arteries. 2.3 mm posteriorly oriented saccular outpouching arising         from posterior communicating artery origin, which can be a small          aneurysm vs dilated infundibulum.        Diagnosis  GBS onset August 27, 2023 normal EMG November 22, 2023  Migraine headaches    Past workup  MRI head nonspecific T2 flair changes consistent with " "migraine headache history  MRI cervical/thoracic/lumbar spine no compression no cord lesions      Hands feel tired when doing tasks like vacuuming or chopping vegetables  Suspect this is more of a tendinitis  She wants to work out to see if she can increase her muscle strength  Recommended that she go slow with weight and repetitions use machines not open weight  Use an exercise glove/wrist wrap    I think some of the above symptom is more from repetition    Migraines  Frequency 2/month on average but manageable  Duration 24 hours  Does not want to add any medications or take meds regularly      Has noticed that her vision will \"shake\"  She will lay down and it looks like her vision is \"jiggling\"  The description would be somewhat like oscillopsia but I do not see any movements of the eye with head movements or body movements or on ophthalmic exam  Can sometimes have a hard time focusing on objects that are close up    Will have her see the ophthalmologist    Follow-up spring/April 2025  Discussed with patient and   helps with some translation  Neuroexam is stable    30 minutes total care time today  The longitudinal plan of care for the diagnosis(es)/condition(s) as documented were addressed during this visit. Due to the added complexity in care, I will continue to support Lydia in the subsequent management and with ongoing continuity of care.      As part of visit today  Reviewed primary MD note 4/16/2024 (patient with some lightheadedness rapid heart rate short of breath  Patient in ED 6/3/2024 presumed plantar fasciitis recommended conservative treatment      Again, thank you for allowing me to participate in the care of your patient.        Sincerely,        emelina Okeefe MD  "

## 2024-06-17 NOTE — NURSING NOTE
"Chief Complaint   Patient presents with    Guillain-Crawford syndrome     7 month follow up. Her hands will feel tired when doing tasks like vacuuming or chopping veggies.    Migraine     Still has the \"buzzing\" symptoms, but migraines have improved. She has about 2 migraines per month on average. But they are manageable.    Vision Changes Ou     She has noticed her vision will \"shake\". She will have a hard time focusing on objects that are close up.     Luisa Pedraza LPN on 6/17/2024 at 2:56 PM    "

## 2024-06-27 ENCOUNTER — OFFICE VISIT (OUTPATIENT)
Dept: OBGYN | Facility: CLINIC | Age: 29
End: 2024-06-27
Payer: COMMERCIAL

## 2024-06-27 VITALS
DIASTOLIC BLOOD PRESSURE: 63 MMHG | OXYGEN SATURATION: 98 % | BODY MASS INDEX: 21.71 KG/M2 | SYSTOLIC BLOOD PRESSURE: 97 MMHG | HEART RATE: 68 BPM | WEIGHT: 114.9 LBS

## 2024-06-27 DIAGNOSIS — B37.31 YEAST INFECTION OF THE VAGINA: ICD-10-CM

## 2024-06-27 DIAGNOSIS — N89.8 VAGINAL DISCHARGE: Primary | ICD-10-CM

## 2024-06-27 PROCEDURE — 87210 SMEAR WET MOUNT SALINE/INK: CPT

## 2024-06-27 PROCEDURE — 99213 OFFICE O/P EST LOW 20 MIN: CPT

## 2024-06-27 PROCEDURE — 87491 CHLMYD TRACH DNA AMP PROBE: CPT

## 2024-06-27 PROCEDURE — 87591 N.GONORRHOEAE DNA AMP PROB: CPT

## 2024-06-27 RX ORDER — FLUCONAZOLE 150 MG/1
TABLET ORAL
Qty: 2 TABLET | Refills: 0 | Status: SHIPPED | OUTPATIENT
Start: 2024-06-27

## 2024-06-27 NOTE — PROGRESS NOTES
Subjective:  Pam is a 28 year old   is here today with the following concerns:    Vaginal discharge: x 3 days. She is currently on Day 1 of menses. Vaginal discharge is green, no odor, chunky. Reports she also has itching and that her  has similar symptoms and was given Metronidazole from his PCP.  He is uncircumcised.   IUD: she is interested in Cu IUD for contraception. Using condoms currently.      ROS: Pertinent ROS as above.    Medical history  OB History    Para Term  AB Living   3 3 3 0 0 3   SAB IAB Ectopic Multiple Live Births   0 0 0 0 3      # Outcome Date GA Lbr Marek/2nd Weight Sex Type Anes PTL Lv   3 Term 10/27/20 39w0d  3.49 kg (7 lb 11.1 oz) F   N BRANDON      Name: MARE GREENWOOD-PAM      Apgar1: 8  Apgar5: 9   2 Term  38w0d  3 kg (6 lb 9.8 oz) F   N BRANDON      Name: Enerelt   1 Term  40w0d  3.5 kg (7 lb 11.5 oz) M   N BRANDON      Name: Renu      Past Medical History:   Diagnosis Date    History of colposcopy 2022    Migraine       Past Surgical History:   Procedure Laterality Date    APPENDECTOMY       SECTION N/A 10/27/2020    Procedure:  SECTION;  Surgeon: Janet De La Garza MD;  Location:  L+D    COLONOSCOPY N/A 9/15/2022    Procedure: COLONOSCOPY;  Surgeon: Amandeep Maurice MD;  Location:  GI    ESOPHAGOSCOPY, GASTROSCOPY, DUODENOSCOPY (EGD), COMBINED N/A 9/15/2022    Procedure: ESOPHAGOGASTRODUODENOSCOPY, WITH BIOPSY;  Surgeon: Amandeep Maurice MD;  Location:  GI       ALL/Meds: Her medication and allergy histories were reviewed and are documented in their appropriate chart areas.    SH: Reviewed and documented in the appropriate area of the chart.  FH:  Her family history is reviewed and updated in the chart, today.  PMH: Her past medical, surgical, and obstetric histories were reviewed and updated today in the appropriate chart areas.    Objective:  PE: BP 97/63 (BP Location: Right arm, Patient  Position: Sitting, Cuff Size: Adult Regular)   Pulse 68   Wt 52.1 kg (114 lb 14.4 oz)   LMP 2024 (Exact Date)   SpO2 98%   BMI 21.71 kg/m    Body mass index is 21.71 kg/m .    Pertinent Physical exam findings:    GENERAL: Active, alert, in no acute distress.  SKIN: Clear. No significant rash, abnormal pigmentation or lesions  MS: no gross musculoskeletal defects noted, no edema  PSYCH: Age-appropriate alertness and orientation    Pelvic:       - Ext: Vulva and perineum are normal without lesion, mass or discharge        - Urethra: normal without discharge or scarring        - Bladder: no tenderness, no masses       - Vagina: rugated and blood in vault       - Cervix: normal and multiparous       - Uterus: Retroverted, mobile    A/P:  Pam Thompson is a 28 year old  here today with the following concerns:  (N89.8) Vaginal discharge  (primary encounter diagnosis)  Comment: Will treat as result direct. Discussed that symptoms are more consistent with a yeast infection and that she should discuss proper hygiene with  if he is uncircumcised and has similar symptoms.   Plan: Chlamydia trachomatis PCR, Neisseria         gonorrhoeae PCR, Wet preparation    She will return when on her next menses for Cu IUD placement with me. Instructed to have only protected SIC for 2 weeks prior and take Ibuprofen beforehand. We discussed that Cu IUD can make periods heavier and longer.          She is agreeable to the plan above and has no further questions or concerns. She did not request a chaperone for the physical exam component of the visit today.     ROMY García CNP

## 2024-06-27 NOTE — PATIENT INSTRUCTIONS
If you have labs or imaging done, the results will automatically release in [employer] without an interpretation.  Your health care professional will review those results and send an interpretation with recommendations as soon as possible, but this may be 1-3 business days.    If you have any questions regarding your visit, please contact your care team.     [facility]: [phone]  [facility] CLINIC HOURS TELEPHONE NUMBER   Bruna Ruby, SHARON Fajardo-DONNA Menendez-DONNA Layton-Surgery Scheduler  Nichole-       Monday- [place]  8:00 am-4:00 pm    Tuesday- [place]  8:00 am-4:00 pm    Wednesday- [place] 8:00 am-4:00 pm    Thursday- [place] 8:00 am-4:00 pm    Friday- [place]  8:00 am-4:00 pm [facility]  [address]  [place] [address]  PH: [phone]  Fax: [phone]    Imaging Scheduling all locations  PH: [phone]     [facility] Labor [facility] Delivery  [address] [place] [address] [address].  [place], [place] [address] [phone]    [facility]  [address] [place] [address]  PH: [phone]     **Surgeries** Our Surgery Schedulers will contact you to schedule. If you do not receive a call within 3 business days, please call [phone].  Urgent Care locations:  [facility]       Monday-Friday   10 am - 8 pm    Saturday and Sunday   9 am - 5 pm   [phone]   If you need a medication refill, please contact your pharmacy. Please allow 3 business days for your refill to be completed.  As always, Thank you for trusting us with your healthcare needs!

## 2024-06-28 LAB
C TRACH DNA SPEC QL NAA+PROBE: NEGATIVE
N GONORRHOEA DNA SPEC QL NAA+PROBE: NEGATIVE

## 2024-09-04 ENCOUNTER — OFFICE VISIT (OUTPATIENT)
Dept: OBGYN | Facility: CLINIC | Age: 29
End: 2024-09-04
Payer: COMMERCIAL

## 2024-09-04 VITALS
WEIGHT: 117.6 LBS | DIASTOLIC BLOOD PRESSURE: 59 MMHG | OXYGEN SATURATION: 96 % | SYSTOLIC BLOOD PRESSURE: 101 MMHG | HEART RATE: 73 BPM | BODY MASS INDEX: 22.22 KG/M2

## 2024-09-04 DIAGNOSIS — R10.2 PELVIC PAIN IN FEMALE: Primary | ICD-10-CM

## 2024-09-04 DIAGNOSIS — N93.0 PCB (POST COITAL BLEEDING): ICD-10-CM

## 2024-09-04 LAB
ALBUMIN UR-MCNC: NEGATIVE MG/DL
APPEARANCE UR: CLEAR
BILIRUB UR QL STRIP: NEGATIVE
CLUE CELLS: ABNORMAL
COLOR UR AUTO: COLORLESS
GLUCOSE UR STRIP-MCNC: NEGATIVE MG/DL
HGB UR QL STRIP: NEGATIVE
KETONES UR STRIP-MCNC: NEGATIVE MG/DL
LEUKOCYTE ESTERASE UR QL STRIP: NEGATIVE
NITRATE UR QL: NEGATIVE
PH UR STRIP: 7 [PH] (ref 5–7)
SKIP: NORMAL
SP GR UR STRIP: 1 (ref 1–1.03)
TRICHOMONAS, WET PREP: ABNORMAL
UROBILINOGEN UR STRIP-MCNC: NORMAL MG/DL
WBC'S/HIGH POWER FIELD, WET PREP: ABNORMAL
YEAST, WET PREP: ABNORMAL

## 2024-09-04 PROCEDURE — 99214 OFFICE O/P EST MOD 30 MIN: CPT

## 2024-09-04 PROCEDURE — 87491 CHLMYD TRACH DNA AMP PROBE: CPT

## 2024-09-04 PROCEDURE — 87210 SMEAR WET MOUNT SALINE/INK: CPT

## 2024-09-04 PROCEDURE — G0145 SCR C/V CYTO,THINLAYER,RESCR: HCPCS

## 2024-09-04 PROCEDURE — 81003 URINALYSIS AUTO W/O SCOPE: CPT

## 2024-09-04 PROCEDURE — 87591 N.GONORRHOEAE DNA AMP PROB: CPT

## 2024-09-04 NOTE — PATIENT INSTRUCTIONS
If you have labs or imaging done, the results will automatically release in [employer] without an interpretation.  Your health care professional will review those results and send an interpretation with recommendations as soon as possible, but this may be 1-3 business days.    If you have any questions regarding your visit, please contact your care team.     [facility]: [phone]  [facility] CLINIC HOURS TELEPHONE NUMBER   Bruna Ruby, SHARON Fajardo-DONNA Menendez-DONNA Layton-Surgery Scheduler  Nichole-       Monday- [place]  8:00 am-4:00 pm    Tuesday- [place]  8:00 am-4:00 pm    Wednesday- [place] 8:00 am-4:00 pm    Thursday- [place] 8:00 am-4:00 pm    Friday- [place]  8:00 am-4:00 pm [facility]  [address]  [place] [address]  PH: [phone]  Fax: [phone]    Imaging Scheduling all locations  PH: [phone]     [facility] Labor and Delivery  [address] [place] [address] Dr.  [place], [place] [address] [phone]    [facility]  [address] [place] [address]  PH: [phone]     **Surgeries** Our Surgery Schedulers will contact you to schedule. If you do not receive a call within 3 business days, please call [phone].  Urgent Care locations:  [facility]       Monday-Friday   10 am - 8 pm    Saturday and Sunday   9 am - 5 pm   [phone]   If you need a medication refill, please contact your pharmacy. Please allow 3 business days for your refill to be completed.  As always, Thank you for trusting us with your healthcare needs!

## 2024-09-04 NOTE — PROGRESS NOTES
Subjective:  Pam is a 28 year old   is here today with the following concerns:    Pelvic pain: x 1 month. Reports it is constant, dull and achy and wraps around bilateral pelvis and lower back. Her cycles are monthly and she has not had any abnormal bleeding except 2 episodes of minor PCB. Denies any abdominal tenderness, fevers, abnormal discharge, odor. She is not on any birth control and is interested in IUD but would like to get this figured out first. She has not noticed any association between the pain and any activities. She has not taken any OTC medications for pain.       ROS: Pertinent ROS as above.    Medical history  OB History    Para Term  AB Living   3 3 3 0 0 3   SAB IAB Ectopic Multiple Live Births   0 0 0 0 3      # Outcome Date GA Lbr Marek/2nd Weight Sex Type Anes PTL Lv   3 Term 10/27/20 39w0d  3.49 kg (7 lb 11.1 oz) F   N BRANDON      Name: MARE GREENWOOD-PAM      Apgar1: 8  Apgar5: 9   2 Term  38w0d  3 kg (6 lb 9.8 oz) F   N BRANDON      Name: Enerelt   1 Term  40w0d  3.5 kg (7 lb 11.5 oz) M   N BRANDON      Name: Renu      Past Medical History:   Diagnosis Date    History of colposcopy 2022    Migraine       Past Surgical History:   Procedure Laterality Date    APPENDECTOMY       SECTION N/A 10/27/2020    Procedure:  SECTION;  Surgeon: Janet De La Garza MD;  Location:  L+D    COLONOSCOPY N/A 9/15/2022    Procedure: COLONOSCOPY;  Surgeon: Amandeep Maurice MD;  Location:  GI    ESOPHAGOSCOPY, GASTROSCOPY, DUODENOSCOPY (EGD), COMBINED N/A 9/15/2022    Procedure: ESOPHAGOGASTRODUODENOSCOPY, WITH BIOPSY;  Surgeon: Amandeep Maurice MD;  Location:  GI       ALL/Meds: Her medication and allergy histories were reviewed and are documented in their appropriate chart areas.    SH: Reviewed and documented in the appropriate area of the chart.  FH:  Her family history is reviewed and updated in the chart, today.  PMH: Her past  medical, surgical, and obstetric histories were reviewed and updated today in the appropriate chart areas.    Objective:  PE: /59 (BP Location: Right arm, Cuff Size: Adult Regular)   Pulse 73   Wt 53.3 kg (117 lb 9.6 oz)   LMP 2024 (Exact Date)   SpO2 96%   BMI 22.22 kg/m    Body mass index is 22.22 kg/m .    Pertinent Physical exam findings:    GENERAL: Active, alert, in no acute distress.  SKIN: Clear. No significant rash, abnormal pigmentation or lesions  MS: no gross musculoskeletal defects noted, no edema  PSYCH: Age-appropriate alertness and orientation    Pelvic:       - Ext: Vulva and perineum are normal without lesion, mass or discharge        - Urethra: normal without discharge or scarring        - Bladder: no tenderness, no masses       - Vagina: with white, creamy, and odorless discharge and rugated       - Cervix: normal, multiparous, and mild ectropion       - Uterus: Normal shape, position and consistency       - Adnexa: Normal without masses or tenderness    A/P:  Pam Thompson is a 28 year old  here today with the following concerns:  (R10.2) Pelvic pain in female  (primary encounter diagnosis)  Comment: We discussed that there are many possibly etiologies for pelvic pain, which may include but are not limited to ovarian cyst/mass, ovarian torsion, PID, ectopic pregnancy, uterine fibroids, endometriosis, muskuloskeletal or pelvic floor dysfunction, interstitial cystitis, cystitis, IBS/IBD, chronic constipation, etc.   Based on her symptoms, GI, MSK/PFD, pregnancy, and mass are high on the differential.   Plan: US Pelvic Complete with Transvaginal, HCG         qualitative urine, Pap Diagnostic Reflex to HPV        if ASCUS, Chlamydia trachomatis PCR, Neisseria         gonorrhoeae PCR, Wet preparation, UA         Macroscopic with reflex to Microscopic and         Culture    (N93.0) PCB (post coital bleeding)  Comment: We discussed the various causes of post-coital  bleeding, such as infection, malignancy, vaginal atrophy, vaginal tear/laceration, cervical polyp, ectropion cervix. Based on her history and physical exam, ectropion cervix is high on the list of differentials.  Plan: US Pelvic Complete with Transvaginal, HCG         qualitative urine, Pap Diagnostic Reflex to HPV        if ASCUS, Chlamydia trachomatis PCR, Neisseria         gonorrhoeae PCR, Wet preparation           She is agreeable to the plan above and has no further questions or concerns. She did not request a chaperone for the physical exam component of the visit today.     ROMY García CNP

## 2024-09-05 LAB
C TRACH DNA SPEC QL NAA+PROBE: NEGATIVE
N GONORRHOEA DNA SPEC QL NAA+PROBE: NEGATIVE

## 2024-09-09 LAB
BKR LAB AP GYN ADEQUACY: NORMAL
BKR LAB AP GYN INTERPRETATION: NORMAL
BKR LAB AP HPV REFLEX: NORMAL
BKR LAB AP LMP: NORMAL
BKR LAB AP PREVIOUS ABNORMAL: NORMAL
PATH REPORT.COMMENTS IMP SPEC: NORMAL
PATH REPORT.COMMENTS IMP SPEC: NORMAL
PATH REPORT.RELEVANT HX SPEC: NORMAL

## 2024-09-10 ENCOUNTER — ANCILLARY PROCEDURE (OUTPATIENT)
Dept: ULTRASOUND IMAGING | Facility: CLINIC | Age: 29
End: 2024-09-10
Payer: COMMERCIAL

## 2024-09-10 DIAGNOSIS — N93.0 PCB (POST COITAL BLEEDING): ICD-10-CM

## 2024-09-10 DIAGNOSIS — R10.2 PELVIC PAIN IN FEMALE: ICD-10-CM

## 2024-09-10 PROCEDURE — 76830 TRANSVAGINAL US NON-OB: CPT | Performed by: RADIOLOGY

## 2024-09-10 PROCEDURE — 76856 US EXAM PELVIC COMPLETE: CPT | Performed by: RADIOLOGY

## 2024-09-25 ENCOUNTER — MYC MEDICAL ADVICE (OUTPATIENT)
Dept: OBGYN | Facility: CLINIC | Age: 29
End: 2024-09-25
Payer: COMMERCIAL

## 2024-09-25 DIAGNOSIS — R10.2 PELVIC PAIN IN FEMALE: Primary | ICD-10-CM

## 2024-09-25 NOTE — TELEPHONE ENCOUNTER
I sent in a PFPT referral. They should be calling her. Is she still having PCB?    Thanks!  ROMY García CNP

## 2024-09-25 NOTE — TELEPHONE ENCOUNTER
Pt is wondering what next steps are after her testing was normal.    Pt was last seen by Bruna Ruby, ROMY CNP, on 9/4/24 for pelvic pain.  Pt had labs and US done which were all normal.    Pt is writing in asking what the next steps in her care are, should she follow up with Bruna in clinic to discuss further or will Bruna place a pelvic floor PT referral for her?    Routing to Bruna to further advise.    Gemma Hargrove, RN-[facility]

## 2024-09-25 NOTE — TELEPHONE ENCOUNTER
Pt is no longer experiencing PCB.  Advised if she experiences again to be seen in clinic.    Gemma Hargrove RN-[facility]

## 2024-11-13 ENCOUNTER — MYC MEDICAL ADVICE (OUTPATIENT)
Dept: NEUROLOGY | Facility: CLINIC | Age: 29
End: 2024-11-13
Payer: COMMERCIAL

## 2024-11-13 NOTE — TELEPHONE ENCOUNTER
Yes it is okay for you to get the COVID and hepatitis B vaccines with your neurologic condition  Some of these questions though also should go to your primary MD in the future.  emelina Okeefe MD on 11/13/2024 at 3:48 PM

## 2025-01-16 ENCOUNTER — THERAPY VISIT (OUTPATIENT)
Dept: PHYSICAL THERAPY | Facility: CLINIC | Age: 30
End: 2025-01-16
Payer: COMMERCIAL

## 2025-01-16 DIAGNOSIS — R10.2 PELVIC PAIN IN FEMALE: ICD-10-CM

## 2025-01-16 PROCEDURE — 97161 PT EVAL LOW COMPLEX 20 MIN: CPT | Mod: GP | Performed by: PHYSICAL THERAPIST

## 2025-01-16 PROCEDURE — 97110 THERAPEUTIC EXERCISES: CPT | Mod: GP | Performed by: PHYSICAL THERAPIST

## 2025-01-16 PROCEDURE — 97530 THERAPEUTIC ACTIVITIES: CPT | Mod: GP | Performed by: PHYSICAL THERAPIST

## 2025-01-16 NOTE — PROGRESS NOTES
PHYSICAL THERAPY EVALUATION  Type of Visit: Evaluation       Fall Risk Screen:  Fall screen completed by: PT  Have you fallen 2 or more times in the past year?: No  Have you fallen and had an injury in the past year?: No  Is patient a fall risk?: No    Subjective         Presenting condition or subjective complaint: lower abdomen pain (like period)  Date of onset:      Relevant medical history: Migraines or headaches   Dates & types of surgery:      Prior diagnostic imaging/testing results:       Prior therapy history for the same diagnosis, illness or injury: No      Prior Level of Function  Transfers: Independent  Ambulation: Independent  ADL: Independent  IADL:     Living Environment  Social support: With family members   Type of home: [facility]   Stairs to enter the home:         Ramp: No   Stairs inside the home: Yes   Is there a railing: Yes     Help at home: None  Equipment owned:       Employment: No    Hobbies/Interests:      Patient goals for therapy:      Pain assessment: Location: Low abdominal/Ratin-7/10 PL     Objective      PELVIC EVALUATION  ADDITIONAL HISTORY:  Sex assigned at birth: Female  Gender identity: Female    Pronouns:        Bladder History:  Feels bladder filling: Yes  Triggers for feeling of inability to wait to go to the bathroom: No    How long can you wait to urinate:    Gets up at night to urinate: Yes 1to2 times  Can stop the flow of urine when urinating: Yes  Volume of urine usually released: Large   Other issues: Trouble emptying bladder completely; Dribbling after urinating  Number of bladder infections in last 12 months:    Fluid intake per day: 1000ml 8oz    Medications taken for bladder: No     Activities causing urine leak: Sneeze; Hurrying to the bathroom due to a strong urge to urinate (pee)    Amount of urine typically leaked: small  Pads used to help with leaking: Yes        Bowel History:  Frequency of bowel movement: everyday  Consistency of stool: Soft     Ignores the urge to defecate: No  Other bowel issues: Straining to have bowel movement  Length of time spent trying to have a bowel movement: not tht much sometimes it needs to pushed out outside of the rectum area    Sexual Function History:  Sexual orientation: Straight    Sexually active: Yes  Lubrication used: Yes Yes  Pelvic pain: Walking; Standing; Sitting; Deep penetration (rectal or vaginal)    Pain or difficulty with orgasms/erection/ejaculation: No    State of menopause:    Hormone medications: No      Are you currently pregnant: No  Number of previous pregnancies: 3  Number of deliveries: 3  If you have delivered before, did you have any of these issues during delivery: Tearing; Episiotomy;  delivery; Vaginal delivery  Have you been diagnosed with pelvic prolapse or abdominal separation: No  Do you get regular exercise: Yes  I do this type of exercise: gym (walk and weight)  Have you tried pelvic floor strengthening exercises for 4 weeks: No  Do you have any history of trauma that is relevant to your care that you d like to share: No      Discussed reason for referral regarding pelvic health needs and external/internal pelvic floor muscle examination with patient/guardian.  Opportunity provided to ask questions and verbal consent for assessment and intervention was given.    PAIN: 7/10 PL  POSTURE:   LUMBAR SCREEN:   HIP SCREEN:  Strength:    Functional Strength Testing:     PELVIC/SI SCREEN:     PAIN PROVOCATION TEST:   PELVIS/SI SPECIAL TESTS:   BREATHING SYMMETRY:     PELVIC EXAM  External Visual Inspection:  At rest: Normal  With voluntary pelvic floor contraction: Perineal elevation  Relaxation of PFM: Partial/delayed relaxation    Integumentary:   Introitus: Unremarkable    External Digital Palpation per Perineum:   Ischiocavernosis: Unremarkable  Bulbo cavernosis: Unremarkable  Transverse perineal: Tightness  Levator ani: Tightness    Scar:   Location/Type:   Mobility:     Internal  Digital Palpation:  Per Vagina:  Tenderness  Myofascial Resistance to Palpation: Firm  Digital Muscle Performance: P (Power): 5/5  E (Endurance): able to control contraction for 10 seconds  Compensations: good contraction  Relaxation Post-Contraction: Partial/delayed relaxation    Per Rectum:        Pelvic Organ Prolapse:       ABDOMINAL ASSESSMENT  Diastasis Rectus Abdominis (NIYA):      Abdominal Activation/Strength:     Scar:   Location/Type:   Mobility:     Fascial Tension/Restriction:     BIOFEEDBACK:  Position: Supine  Surface Electrodes: Perineal    Abdominals:     Perianals:   Baseline muscle activity: 2.1 microvolts  Endurance Hold-Average mean amplitude/work average: 16.4uV/0.9uV/W-R+15.53  Quick Flicks-Average mean amplitude/work average: 12.4uV/1.8uB/W-R+10.61    DERMATOMES:   DTR S:     Assessment & Plan   CLINICAL IMPRESSIONS  Medical Diagnosis: Pelvic Pain    Treatment Diagnosis:     Impression/Assessment: Patient is a 29 year old female with pelvic pain complaints.  The following significant findings have been identified: Pain, Decreased ROM/flexibility, Impaired muscle performance, and Decreased activity tolerance. These impairments interfere with their ability to perform self care tasks and recreational activities as compared to previous level of function.     Clinical Decision Making (Complexity):  Clinical Presentation: Stable/Uncomplicated  Clinical Presentation Rationale: based on medical and personal factors listed in PT evaluation  Clinical Decision Making (Complexity): Low complexity    PLAN OF CARE  Treatment Interventions:  Interventions: Manual Therapy, Neuromuscular Re-education, Therapeutic Activity, Therapeutic Exercise    Long Term Goals     PT Goal 1  Goal Identifier: Pelvic pain  Goal Description: No pelvic pain with sitting, standing, intercourse  Rationale: to maximize safety and independence with performance of ADLs and functional tasks  Goal Progress: 7/10 PL  Target Date:  04/10/25      Frequency of Treatment: 1x/ week  Duration of Treatment: 12 weeks    Recommended Referrals to Other Professionals:   Education Assessment:        Risks and benefits of evaluation/treatment have been explained.   Patient/Family/caregiver agrees with Plan of Care.     Evaluation Time:     PT Eval, Low Complexity Minutes (96011): 30       Signing Clinician: Edita Fernandez PT

## 2025-01-19 PROBLEM — R10.2 PELVIC PAIN IN FEMALE: Status: ACTIVE | Noted: 2025-01-19

## 2025-01-29 ENCOUNTER — THERAPY VISIT (OUTPATIENT)
Dept: PHYSICAL THERAPY | Facility: CLINIC | Age: 30
End: 2025-01-29
Payer: COMMERCIAL

## 2025-01-29 DIAGNOSIS — R10.2 PELVIC PAIN IN FEMALE: Primary | ICD-10-CM

## 2025-01-29 PROCEDURE — 97140 MANUAL THERAPY 1/> REGIONS: CPT | Mod: GP | Performed by: PHYSICAL THERAPIST

## 2025-01-29 PROCEDURE — 97110 THERAPEUTIC EXERCISES: CPT | Mod: GP | Performed by: PHYSICAL THERAPIST

## 2025-02-15 ENCOUNTER — HEALTH MAINTENANCE LETTER (OUTPATIENT)
Age: 30
End: 2025-02-15

## 2025-02-22 ENCOUNTER — NURSE TRIAGE (OUTPATIENT)
Dept: FAMILY MEDICINE | Facility: CLINIC | Age: 30
End: 2025-02-22
Payer: COMMERCIAL

## 2025-02-24 ENCOUNTER — E-VISIT (OUTPATIENT)
Dept: FAMILY MEDICINE | Facility: CLINIC | Age: 30
End: 2025-02-24
Payer: COMMERCIAL

## 2025-02-24 ENCOUNTER — MYC MEDICAL ADVICE (OUTPATIENT)
Dept: FAMILY MEDICINE | Facility: CLINIC | Age: 30
End: 2025-02-24

## 2025-02-24 DIAGNOSIS — J10.1 INFLUENZA A: Primary | ICD-10-CM

## 2025-02-24 RX ORDER — OSELTAMIVIR PHOSPHATE 75 MG/1
75 CAPSULE ORAL 2 TIMES DAILY
Qty: 10 CAPSULE | Refills: 0 | Status: SHIPPED | OUTPATIENT
Start: 2025-02-24 | End: 2025-03-01

## 2025-02-24 NOTE — TELEPHONE ENCOUNTER
This, like all new issues, needs some type of billable visit.  In this case an e-visit would work, but I am not going to prescribe or dispense any medication or treatment without a visit

## 2025-02-24 NOTE — TELEPHONE ENCOUNTER
Called pt and relayed provider message to please submit an E-visit.    Pt verbalized understanding.    Debbie Hess RN, BSN  [facility] Triage

## 2025-02-24 NOTE — TELEPHONE ENCOUNTER
Influenza-Like Illness (FAUSTO) RN Standing Order (13+)    Pam Thompson      Age: 29 year old     YOB: 1995    Patient has been triaged using Epic triage guidelines: Patient does not need higher level of care     Has the patient been seen at a [facility] or [facility] (established in primary or specialty care) in the last two years? Yes     Does the patient have symptoms or been exposed to a confirmed case of influenza?  Symptoms- patient has FAUSTO symptoms that started <48 hours ago and has had close contact with a confirmed case of influenza within 5 days.     Since this patient has symptoms, does the patient have ANY of the following?  Younger than 5 years of age or age 65 and older No   Chronic pulmonary disease such as asthma or COPD No   Heart disease (CHF, CAD, anticoagulation d/t arrhytmia, congenital heart anomaly) *HTN alone is excluded No   Kidney disease insufficiency No   Hepatic or Hematologic disorder (e.g. chronic liver disease patient, sickle cell disease) No   Diabetes (Type 1 or Type 2) No   Neurologic and Neurodevelopment Conditions (including disorders of the brain, spinal cord, peripheral nerve, and muscle, such as cerebral palsy, epilepsy (seizure disorders), stroke, intellectual disability, moderate to severe developmental delay, muscular dystrophy, or spinal cord injury) No   Obese with BMI >40 No   Immunosuppression caused by medications such as those taking prednisone in excess of 20mg daily, or caused by HIV/AIDS with CD4 count more than 200 No   Is pregnant, may be pregnant, or is within two weeks after delivery No   Is a resident of a chronic care facility No   Is the patient considered a non- Black,  or , or  or  racial or ethnic minority group? No   Is <19 years old and is receiving long term aspirin- or salicylate-containing medications No     Does this patient have ANY of the above conditions? No. Since the  patient does not have any high risk conditions they do not qualify for antiviral treatment. Recommend a call back if symptoms worsen.     Pt would like to know if Dr. 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 Will route message to provider for advisement. Preferred pharmacy pended.     Reviewed home care advice with patient, who verbalized understanding.     Additional educational resources include:  http://www.[employer]  http://www.cdc.gov/flu/  Debbie Hess RN        Reason for Disposition   Influenza suspected (i.e., cough, fever, respiratory symptoms; probable influenza exposure)   HIGH RISK (e.g., age > 64 years, pregnant, HIV+, chronic medical condition) and flu symptoms    Additional Information   Negative: SEVERE difficulty breathing (e.g., struggling for each breath, speaks in single words)   Negative: Bluish (or gray) lips or face   Negative: Shock suspected (e.g., cold/pale/clammy skin, too weak to stand, low BP, rapid pulse)   Negative: Sounds like a life-threatening emergency to the triager   Negative: Symptoms of COVID-19 (e.g., cough, fever, SOB, or others) and COVID-19 is widespread in the community   Negative: Sounds like a cold and there is no fever   Negative: Cough and there is no fever   Negative: Severe cough   Negative: Throat pain and there is no fever   Negative: Severe sore throat   Negative: Influenza vaccine reaction is suspected   Negative: Headache and stiff neck (can't touch chin to chest)   Negative: Chest pain  (Exception: MILD central chest pain, present only when coughing.)   Negative: Difficulty breathing that is not severe and not relieved by cleaning out the nose   Negative: Patient sounds very sick or weak to the triager   Negative: Fever > 104 F (40 C)   Negative: Fever > 101 F (38.3 C) and over 60 years of age   Negative: Fever > 100 F (37.8 C) and diabetes mellitus or weak immune system (e.g., HIV positive, cancer chemo, splenectomy, organ  "transplant, chronic steroids)   Negative: Fever > 100 F (37.8 C) and bedridden (e.g., CVA, chronic illness, recovering from surgery)   Negative: Using nasal washes and pain medicine > 24 hours and sinus pain (lower forehead, cheekbone, or eye) persists   Negative: Fever present > 3 days (72 hours)   Negative: Fever returns after gone for over 24 hours and symptoms worse (or not improved)   Negative: Earache   Negative: Patient wants to be seen    Answer Assessment - Initial Assessment Questions  1. WORST SYMPTOM: \"What is your worst symptom?\" (e.g., cough, runny nose, muscle aches, headache, sore throat, fever)       Cough, headache, chills  2. ONSET: \"When did your flu symptoms start?\"       Last night  3. COUGH: \"How bad is the cough?\"        Frequent  4. RESPIRATORY DISTRESS: \"Describe your breathing.\"       Says she is breathing \"normal\" but sometimes feels like she has just finished running a race  5. FEVER: \"Do you have a fever?\" If Yes, ask: \"What is your temperature, how was it measured, and when did it start?\"      37.9 C  6. EXPOSURE: \"Were you exposed to someone with influenza?\"        Yes, her son  7. FLU VACCINE: \"Did you get a flu shot this year?\"      No  8. HIGH RISK DISEASE: \"Do you have any chronic medical problems?\" (e.g., heart or lung disease, asthma, weak immune system, or other HIGH RISK conditions)      [place]  9. PREGNANCY: \"Is there any chance you are pregnant?\" \"When was your last menstrual period?\"      No  10. OTHER SYMPTOMS: \"Do you have any other symptoms?\"  (e.g., runny nose, muscle aches, headache, sore throat)        Sore throat    Protocols used: Influenza (Flu) Exposure-A-OH, Influenza (Flu) - Seasonal-A-[place]    "

## (undated) DEVICE — DRSG STERI STRIP 1/2X4" R1547

## (undated) DEVICE — SUCTION CANISTER MEDIVAC LINER 3000ML W/LID 65651-530

## (undated) DEVICE — SU PLAIN 2-0 CT 27" 853H

## (undated) DEVICE — PACK C-SECTION LF PL15OTA83B

## (undated) DEVICE — BLADE CLIPPER 4406

## (undated) DEVICE — SU VICRYL 3-0 SH 27" J316H

## (undated) DEVICE — PREP CHLORAPREP 26ML TINTED ORANGE  260815

## (undated) DEVICE — GLOVE PROTEXIS W/NEU-THERA 6.0  2D73TE60

## (undated) DEVICE — SU PLAIN 0 FN-2 27" N864H

## (undated) DEVICE — SU VICRYL 0 CT-1 27" J340H

## (undated) DEVICE — SOL NACL 0.9% IRRIG 1000ML BOTTLE 2F7124

## (undated) DEVICE — ESU GROUND PAD UNIVERSAL W/O CORD

## (undated) DEVICE — GLOVE PROTEXIS POWDER FREE 7.0 ORTHOPEDIC 2D73ET70

## (undated) DEVICE — SU CHROMIC 0 CT 27" 802H

## (undated) DEVICE — LINEN C-SECTION 5415

## (undated) DEVICE — CATH TRAY FOLEY 16FR BARDEX W/DRAIN BAG STATLOCK 300316A

## (undated) RX ORDER — OXYTOCIN/0.9 % SODIUM CHLORIDE 30/500 ML
PLASTIC BAG, INJECTION (ML) INTRAVENOUS
Status: DISPENSED
Start: 2020-10-27

## (undated) RX ORDER — LIDOCAINE HCL/EPINEPHRINE/PF 2%-1:200K
VIAL (ML) INJECTION
Status: DISPENSED
Start: 2020-10-27

## (undated) RX ORDER — MORPHINE SULFATE 1 MG/ML
INJECTION, SOLUTION EPIDURAL; INTRATHECAL; INTRAVENOUS
Status: DISPENSED
Start: 2020-10-27